# Patient Record
Sex: FEMALE | Race: WHITE | Employment: OTHER | URBAN - METROPOLITAN AREA
[De-identification: names, ages, dates, MRNs, and addresses within clinical notes are randomized per-mention and may not be internally consistent; named-entity substitution may affect disease eponyms.]

---

## 2017-07-10 NOTE — LETTER
7/12/2017 2:07 PM 
 
Ms. Ann Logan [facility] [address] [address] Office visit is due and need to bring cuff to check BP readings at home. Need lab work to assess kidney function on diclofenac.  
 
 
 
 
Sincerely, 
 
 
Imani Pierce MD

## 2017-07-11 RX ORDER — DICLOFENAC SODIUM 50 MG/1
TABLET, DELAYED RELEASE ORAL
Qty: 180 TAB | Refills: 0 | OUTPATIENT
Start: 2017-07-11

## 2017-07-11 NOTE — TELEPHONE ENCOUNTER
OV is due and need to bring cuff to check BP readings at home. Need lab work to assess kidney function on diclofenac.

## 2017-07-11 NOTE — TELEPHONE ENCOUNTER
Left message for patient to call back yo Advise per Dr. Edward Arzate that OV is due and need to bring cuff to check BP readings at home.  Need lab work to assess kidney function on diclofenac.

## 2017-07-17 RX ORDER — DICLOFENAC SODIUM 50 MG/1
TABLET, DELAYED RELEASE ORAL
Qty: 60 TAB | Refills: 0 | Status: SHIPPED | OUTPATIENT
Start: 2017-07-17 | End: 2017-09-05 | Stop reason: SDUPTHER

## 2017-07-17 NOTE — TELEPHONE ENCOUNTER
Pt wants to know can they have a perscription refilled until they come in for their appt on 8/7.  Please give pt a call back

## 2017-07-18 NOTE — TELEPHONE ENCOUNTER
Spoke with patient . Patient states that already scheduled an appointment . Advised patient to bring in blood pressure cuff.  Patient verbalized understanding

## 2017-08-07 ENCOUNTER — OFFICE VISIT (OUTPATIENT)
Dept: INTERNAL MEDICINE CLINIC | Age: 67
End: 2017-08-07

## 2017-08-07 VITALS
RESPIRATION RATE: 16 BRPM | WEIGHT: 174 LBS | HEART RATE: 94 BPM | BODY MASS INDEX: 27.31 KG/M2 | DIASTOLIC BLOOD PRESSURE: 70 MMHG | OXYGEN SATURATION: 98 % | HEIGHT: 67 IN | SYSTOLIC BLOOD PRESSURE: 165 MMHG

## 2017-08-07 DIAGNOSIS — R03.0 WHITE COAT SYNDROME WITHOUT DIAGNOSIS OF HYPERTENSION: Primary | ICD-10-CM

## 2017-08-07 DIAGNOSIS — Z28.21 VACCINATION REFUSED BY PATIENT: ICD-10-CM

## 2017-08-07 DIAGNOSIS — H25.013 CORTICAL AGE-RELATED CATARACT OF BOTH EYES: ICD-10-CM

## 2017-08-07 DIAGNOSIS — G43.009 NONINTRACTABLE MIGRAINE, UNSPECIFIED MIGRAINE TYPE: ICD-10-CM

## 2017-08-07 DIAGNOSIS — Z00.00 ROUTINE GENERAL MEDICAL EXAMINATION AT A HEALTH CARE FACILITY: ICD-10-CM

## 2017-08-07 DIAGNOSIS — Z53.20 COLONOSCOPY REFUSED: ICD-10-CM

## 2017-08-07 DIAGNOSIS — Z00.00 MEDICARE ANNUAL WELLNESS VISIT, SUBSEQUENT: ICD-10-CM

## 2017-08-07 DIAGNOSIS — Z01.810 PREOP CARDIOVASCULAR EXAM: ICD-10-CM

## 2017-08-07 DIAGNOSIS — M19.90 ARTHRITIS: ICD-10-CM

## 2017-08-07 DIAGNOSIS — Z71.89 LIVING WILL, COUNSELING/DISCUSSION: ICD-10-CM

## 2017-08-07 DIAGNOSIS — H40.10X0 OPEN-ANGLE GLAUCOMA OF RIGHT EYE, UNSPECIFIED GLAUCOMA STAGE, UNSPECIFIED OPEN-ANGLE GLAUCOMA TYPE: ICD-10-CM

## 2017-08-07 RX ORDER — ZOLMITRIPTAN 5 MG/1
5 TABLET, FILM COATED ORAL AS NEEDED
Qty: 12 TAB | Refills: 5 | Status: SHIPPED | OUTPATIENT
Start: 2017-08-07 | End: 2019-10-08 | Stop reason: ALTCHOICE

## 2017-08-07 RX ORDER — MINOXIDIL 50 MG/G
AEROSOL, FOAM TOPICAL
COMMUNITY
End: 2020-10-27

## 2017-08-07 NOTE — PROGRESS NOTES
HISTORY OF PRESENT ILLNESS  Giuliana Campos is a 69 y.o. female. HPI  Here for white coat htn. She brings her cuff though she has not checked at home recently. She has cataracts and needs them removed though she is concerned due to her glaucoma. She is due for labs. She uses nsaids daily for djd as tylenol does not work. She is due for a wellness visit. She needs a replacement for imitrex for migraines. Past Medical History:   Diagnosis Date    Degenerative joint disease (DJD) of lumbar spine     GERD (gastroesophageal reflux disease)     Glaucoma     PUD (peptic ulcer disease)     Rheumatic fever      Current Outpatient Prescriptions   Medication Sig    Minoxidil (ROGAINE) 5 % foam by Apply Externally route.  ZOLMitriptan (ZOMIG) 5 mg tablet Take 1 Tab by mouth as needed for Migraine.  diclofenac EC (VOLTAREN) 50 mg EC tablet TAKE 1 TABLET BY MOUTH TWICE DAILY    timolol (TIMOPTIC-XE) 0.25 % ophthalmic gel-forming INT 1 GTT IN OU QAM    Omega-3 Fatty Acids 300 mg cap Take  by mouth.  multivitamin (ONE A DAY) tablet Take 1 Tab by mouth daily.  cholecalciferol (VITAMIN D3) 1,000 unit tablet Take  by mouth daily. No current facility-administered medications for this visit. Review of Systems   All other systems reviewed and are negative. Visit Vitals    /70  Comment: at home meter 162/89    Pulse 94    Resp 16    Ht 5' 7\" (1.702 m)    Wt 174 lb (78.9 kg)    SpO2 98%    BMI 27.25 kg/m2     Her cuff is accurate. Physical Exam   Constitutional: She appears well-developed and well-nourished. Cardiovascular: Normal rate, regular rhythm and normal heart sounds. Pulmonary/Chest: Effort normal and breath sounds normal. No respiratory distress. She has no wheezes. She has no rales. Nursing note and vitals reviewed. ASSESSMENT and PLAN  Diagnoses and all orders for this visit:    1.  White coat syndrome without diagnosis of hypertension  Monitor at home and see me if >150/90.    2. Nonintractable migraine, unspecified migraine type  -   start  ZOLMitriptan (ZOMIG) 5 mg tablet; Take 1 Tab by mouth as needed for Migraine. 3. Arthritis  The current medical regimen is effective;  continue present plan and medications. Aware of GI side effects of NSAIDs. 4. Cortical age-related cataract of both eyes  -     REFERRAL TO OPHTHALMOLOGY    5. Open-angle glaucoma of right eye, unspecified glaucoma stage, unspecified open-angle glaucoma type  Per ophthalmology. 6. Preop cardiovascular exam  Safe for surgery.       Reviewed plan of care with the patient who has provided input and agrees with the goals

## 2017-08-07 NOTE — PATIENT INSTRUCTIONS
Medicare Part B Preventive Services Limitations Recommendation Scheduled   Glaucoma Screening  Covered for patients with diagnosis of diabetes or family history of glaucoma; -Americans age 48 and older; -Americans age 72 and older Completed  6/2017    Treated with timolol drops  Recommended every 6 months Due  11/2017   Colorectal Cancer Screening    -Fecal occult blood test every year OR  -Flexible sigmoidoscopy every 5 yrs OR  -Colonoscopy every 10 years OR  -Barium Enema Age 54-65; After age 76 if history of abnormal results Completed     Colonoscopy Recommended every 10 years  Patient declined     Let your provider know if you have any concerns     Bone Mass Measurement (Dexascan or Bone Density Scan)     Age 61 and older     Completed   12/8/2015    Recommended every 5 years Let your provider know if you have any concerns     Screening Mammography  Age 54-69  Completed  ?     Recommended every 2 years      Patient declined    Let your provider know if you have any concerns   Screening Pap Tests and Pelvic Examination  Age 18-67   Completed    Recommended every 3 years Completed     Let your provider know if you have any concerns     Cardiovascular Screening Blood Tests   (Cholesterol panel) Annually if on cholesterol medication Completed       Recommended every 5 years Due NOW   Diabetes Screening Tests    -Basic Metabolic Panel (BMP) or Hemoglobin A1C (HgbA1C)   BMP every 3 years for non-diabetic patients    HgbA1C every 3-6 months for diabetic patients     Completed        Due NOW   Seasonal Influenza Vaccination  Completed     Recommended Annually Patient declined   Prevnar Vaccine  (PCV 13)         Age 72 and older, protects against more types of Pneumonia than the Pneumococcal Vaccine alone Completed    Recommended once Due      Take prescription to pharmacy for administration   Pneumococcal Vaccination   (PPSV 21) Age 72 and older     Completed     Recommended twice after age 72, space vaccines 5 years apart  Due   Tetanus Vaccine All Ages    -Only covered by Medicare Part D through the [address] a prescription from your primary care provider   Completed   10/6/2011  Recommended every 10 years  Due   10/2021   Zoster Vaccine (Shingles) Age 61 and older    -Only covered by Medicare Part D through the pharmacy       Completed   10/6/2014      Recommended once  Completed    Family Practice Management 2011    Advanced Medical Directive/Living Will  If you have completed an Advanced Medical Directive or a Living Will, please bring a copy to the office at your convenience to be scanned into your record.

## 2017-08-07 NOTE — PROGRESS NOTES
This is an Initial Medicare Annual Wellness Exam (AWV) (Performed 12 months after IPPE or effective date of Medicare Part B enrollment, Once in a lifetime)    I have reviewed the patient's medical history in detail and updated the computerized patient record. History     Past Medical History:   Diagnosis Date    Degenerative joint disease (DJD) of lumbar spine     GERD (gastroesophageal reflux disease)     Glaucoma     PUD (peptic ulcer disease)     Rheumatic fever       History reviewed. No pertinent surgical history. Current Outpatient Prescriptions   Medication Sig Dispense Refill    Minoxidil (ROGAINE) 5 % foam by Apply Externally route.  ZOLMitriptan (ZOMIG) 5 mg tablet Take 1 Tab by mouth as needed for Migraine. 12 Tab 5    diclofenac EC (VOLTAREN) 50 mg EC tablet TAKE 1 TABLET BY MOUTH TWICE DAILY 60 Tab 0    timolol (TIMOPTIC-XE) 0.25 % ophthalmic gel-forming INT 1 GTT IN OU QAM  0    Omega-3 Fatty Acids 300 mg cap Take  by mouth.  multivitamin (ONE A DAY) tablet Take 1 Tab by mouth daily.  cholecalciferol (VITAMIN D3) 1,000 unit tablet Take  by mouth daily.        Allergies   Allergen Reactions    Aspirin Other (comments)     heartburn     Family History   Problem Relation Age of Onset    Cancer Mother      lung    Heart Disease Father      [place]   [address] [place] [address] Cancer Sister      colon    Diabetes Sister     Cancer Sister      breast     Social History   Substance Use Topics    Smoking status: Former Smoker     Quit date: 10/6/1978    Smokeless tobacco: Never Used    Alcohol use Yes     Patient Active Problem List   Diagnosis Code    Glaucoma H40.9    Degenerative joint disease (DJD) of lumbar spine M47.816    GERD (gastroesophageal reflux disease) K21.9    Rheumatic fever I00    PUD (peptic ulcer disease) K27.9    Vaccination refused by patient Z28.21    Colonoscopy refused Z53.20    Primary insomnia F51.01    White coat syndrome without diagnosis of hypertension R03.0  Living will, counseling/discussion Z71.89         Depression Risk Factor Screening:     PHQ over the last two weeks 8/7/2017   Little interest or pleasure in doing things Not at all   Feeling down, depressed or hopeless Not at all   Total Score PHQ 2 0     Alcohol Risk Factor Screening:   deferred    Functional Ability and Level of Safety:     Hearing Loss   none    Activities of Daily Living   Self-care. Requires assistance with: no ADLs    Fall Risk     Fall Risk Assessment, last 12 mths 8/7/2017   Able to walk? Yes   Fall in past 12 months? No     Abuse Screen   Patient is not abused    Review of Systems   Not required  annual wellness visit    Physical Examination     No exam data present    Evaluation of Cognitive Function:  Mood/affect:  happy  Appearance: age appropriate, casually dressed and within normal Limits  Family member/caregiver input: none present    No exam performed today, annual wellness visit. Patient Care Team:  Milli España MD as PCP - General (Internal Medicine)  Yeison Phipps MD (Dermatology)  Silvia Gallo MD (Orthopedic Surgery)  Robi Benites MD (Ophthalmology)    Advice/Referrals/Counseling   Education and counseling provided:  Are appropriate based on today's review and evaluation  End-of-Life planning (with patient's consent)  Pneumococcal Vaccine  Influenza Vaccine  Screening Mammography  Colorectal cancer screening tests  Cardiovascular screening blood test  Screening for glaucoma  Diabetes screening test      Assessment/Plan       ICD-10-CM ICD-9-CM    1. White coat syndrome without diagnosis of hypertension R03.0 796.2    2. Nonintractable migraine, unspecified migraine type G43.009 346.10 ZOLMitriptan (ZOMIG) 5 mg tablet   3. Arthritis M19.90 716.90    4.  Cortical age-related cataract of both eyes H25.013 366.15 REFERRAL TO OPHTHALMOLOGY   5. Open-angle glaucoma of right eye, unspecified glaucoma stage, unspecified open-angle glaucoma type H40.10X0 365.10 365.70    6. Preop cardiovascular exam Z01.810 V72.81    7. Medicare annual wellness visit, subsequent R74.13 P59.4 METABOLIC PANEL, COMPREHENSIVE      CBC WITH AUTOMATED DIFF      LIPID PANEL   8. Living will, counseling/discussion Z71.89 V65.49    9. Colonoscopy refused Z53.20 V64.2    10. Vaccination refused by patient Z28.21 V64.06      An After Visit Summary was printed and given to the patient. 1. All age appropriate screenings and immunizations are discussed with patient. Mrs. Lisa Rodriguez has hx of glaucoma and gets her eyes checked every 6 months, last eye exam was 6/2017. Mrs. Lisa Rodriguez declines mammogram at this time stating that she can not lay down on the exam table d/t hip pain. She is advised that it is usually done standing. She still declines stating she is not interested d/t false positives and bx she has had done in the past.  She has declined having a colorectal screening done as well. She does not want to get any vaccines (pneumo 23, prevnar, or influenza)  as she states she has had adverse effects from immunizations in the past.  Patient is given lab slip for CMP, lipid panel, and CBC today. 2.  Mrs. Lisa Rodriguez has an AMD and POA paperwork at home. She is advised if she brings a copy of her AMD to our office it can be scanned into her EMR. 3.  Patient does low impact aerobic exercise 2-3 times weekly. She has tried water aerobics in the past but felt the water was too hot.

## 2017-08-07 NOTE — MR AVS SNAPSHOT
Visit Information Date & Time Provider Department Dept. Phone Encounter #  
 8/7/2017  2:45 PM Elihu Holter, MD [facility] [phone] [id number] Follow-up Instructions Return in about 6 months (around 2/7/2018). Upcoming Health Maintenance Date Due Hepatitis C Screening 1950 FOBT Q 1 YEAR AGE 50-75 3/8/1998 Pneumococcal 65+ Low/Medium Risk (1 of 2 - PCV13) 3/8/2013 MEDICARE YEARLY EXAM 8/8/2018 GLAUCOMA SCREENING Q2Y 6/1/2019 BREAST CANCER SCRN MAMMOGRAM 8/7/2019 DTaP/Tdap/Td series (2 - Td) 10/6/2021 Allergies as of 8/7/2017  Review Complete On: 8/7/2017 By: Lynn Weaver LPN Severity Noted Reaction Type Reactions Aspirin  10/06/2016    Other (comments)  
 heartburn Current Immunizations  Reviewed on 10/6/2016 Name Date Tdap 10/6/2011 Zoster Vaccine, Live 10/6/2014 Not reviewed this visit You Were Diagnosed With   
  
 Codes Comments White coat syndrome without diagnosis of hypertension    -  Primary ICD-10-CM: R03.0 ICD-9-CM: 796.2 Nonintractable migraine, unspecified migraine type     ICD-10-CM: G43.009 ICD-9-CM: 346.10 Arthritis     ICD-10-CM: M19.90 ICD-9-CM: 716.90 Cortical age-related cataract of both eyes     ICD-10-CM: H25.013 
ICD-9-CM: 366.15 Open-angle glaucoma of right eye, unspecified glaucoma stage, unspecified open-angle glaucoma type     ICD-10-CM: H40.10X0 ICD-9-CM: 365.10, 365.70 Preop cardiovascular exam     ICD-10-CM: Z01.810 ICD-9-CM: V72.81 Medicare annual wellness visit, subsequent     ICD-10-CM: Z00.00 ICD-9-CM: V70.0 Living will, counseling/discussion     ICD-10-CM: Z71.89 ICD-9-CM: V65.49 Colonoscopy refused     ICD-10-CM: Z53.20 ICD-9-CM: V64.2 Vaccination refused by patient     ICD-10-CM: Z28.21 ICD-9-CM: V64.06 Vitals BP Pulse Resp Height(growth percentile) Weight(growth percentile) SpO2 165/70 94 16 5' 7\" (1.702 m) 174 lb (78.9 kg) 98% BMI OB Status Smoking Status 27.25 kg/m2 Unknown Former Smoker Vitals History BMI and BSA Data Body Mass Index Body Surface Area  
 27.25 kg/m 2 1.93 m 2 Preferred Pharmacy Pharmacy Name Phone [facility] [address] [place] [address] [address] [facility] [phone] Your Updated Medication List  
  
   
This list is accurate as of: 8/7/17  3:17 PM.  Always use your most recent med list.  
  
  
  
  
 diclofenac EC 50 mg EC tablet Commonly known as:  VOLTAREN  
TAKE 1 TABLET BY MOUTH TWICE DAILY  
  
 multivitamin tablet Commonly known as:  ONE A DAY Take 1 Tab by mouth daily. Omega-3 Fatty Acids 300 mg Cap Take  by mouth. ROGAINE 5 % Foam  
Generic drug:  Minoxidil  
by Apply Externally route. timolol 0.25 % ophthalmic gel-forming Commonly known as:  TIMOPTIC-XE  
INT 1 GTT IN OU QAM  
  
 VITAMIN D3 1,000 unit tablet Generic drug:  cholecalciferol Take  by mouth daily. ZOLMitriptan 5 mg tablet Commonly known as:  ZOMIG Take 1 Tab by mouth as needed for Migraine. Prescriptions Sent to Pharmacy Refills ZOLMitriptan (ZOMIG) 5 mg tablet 5 Sig: Take 1 Tab by mouth as needed for Migraine. Class: Normal  
 Pharmacy: [facility] [address] [facility] Ph #: [phone] Route: Oral  
  
We Performed the Following CBC WITH AUTOMATED DIFF [32968 CPT(R)] LIPID PANEL [07735 CPT(R)] METABOLIC PANEL, COMPREHENSIVE [29406 CPT(R)] REFERRAL TO OPHTHALMOLOGY [REF57 Custom] Comments:  
 Please evaluate patient for cataract. Follow-up Instructions Return in about 6 months (around 2/7/2018). Referral Information Referral ID Referred By Referred To  
  
 [id number] Carl sweeney, [facility] [employer] [facility], [address] Visits Status Start Date End Date 1 New Request 8/7/17 8/7/18 If your referral has a status of pending review or denied, additional information will be sent to support the outcome of this decision. Patient Instructions Medicare Part B Preventive Services Limitations Recommendation Scheduled Glaucoma Screening  Covered for patients with diagnosis of diabetes or family history of glaucoma; -Americans age 48 and older; -Americans age 72 and older Completed 6/2017 Treated with timolol drops Recommended every 6 months Due 
11/2017 Colorectal Cancer Screening 
 
-Fecal occult blood test every year OR 
-Flexible sigmoidoscopy every 5 yrs OR 
-Colonoscopy every 10 years OR 
-Barium Enema Age 54-65; After age 76 if history of abnormal results Completed Colonoscopy Recommended every 10 years  Patient declined Let your provider know if you have any concerns Bone Mass Measurement (Dexascan or Bone Density Scan) Age 61 and older Completed 12/8/2015 Recommended every 5 years Let your provider know if you have any concerns Screening Mammography  Age 54-69  Completed ? Recommended every 2 years Patient declined Let your provider know if you have any concerns Screening Pap Tests and Pelvic Examination  Age 18-67 Completed Recommended every 3 years Completed Let your provider know if you have any concerns Cardiovascular Screening Blood Tests  
(Cholesterol panel) Annually if on cholesterol medication Completed Recommended every 5 years Due NOW Diabetes Screening Tests -Basic Metabolic Panel (BMP) or Hemoglobin A1C (HgbA1C) BMP every 3 years for non-diabetic patients HgbA1C every 3-6 months for diabetic patients Completed Due NOW Seasonal Influenza Vaccination  Completed Recommended Annually Patient declined Prevnar Vaccine (PCV 13) Age 72 and older, protects against more types of Pneumonia than the Pneumococcal Vaccine alone Completed Recommended once Due Take prescription to pharmacy for administration Pneumococcal Vaccination  
(PPSV 23) Age 72 and older Completed Recommended twice after age 72, space vaccines 5 years apart  Due Tetanus Vaccine All Ages 
 
-Only covered by Medicare Part D through the pharmacy -Requires a prescription from your primary care provider Completed 10/6/2011 Recommended every 10 years  Due  
10/2021 Zoster Vaccine (Shingles) Age 61 and older 
 
-Only covered by Medicare Part D through the pharmacy Completed 10/6/2014 Recommended once  Completed Family Practice Management 2011 Advanced Medical Directive/Living Will If you have completed an Advanced Medical Directive or a Living Will, please bring a copy to the office at your convenience to be scanned into your record. Introducing \[facility]\"" & HEALTH SERVICES! [facility] introduces [employer] patient portal. Now you can access parts of your medical record, email your doctor's office, and request medication refills online. 1. In your internet browser, go to [email or web address] 2. Click on the First Time User? Click Here link in the Sign In box. You will see the New Member Sign Up page. 3. Enter your [employer] Access Code exactly as it appears below. You will not need to use this code after youve completed the sign-up process. If you do not sign up before the expiration date, you must request a new code. · [employer] Access Code: [email or web address] Expires: 11/5/2017  3:07 PM 
 
4. Enter the last four digits of your Social Security Number (xxxx) and Date of Birth (mm/dd/yyyy) as indicated and click Submit. You will be taken to the next sign-up page. 5. Create a [employer] ID. This will be your [employer] login ID and cannot be changed, so think of one that is secure and easy to remember. 6. Create a [employer] password. You can change your password at any time. 7. Enter your Password Reset Question and Answer. This can be used at a later time if you forget your password. 8. Enter your e-mail address. You will receive e-mail notification when new information is available in [address] 9. Click Sign Up. You can now view and download portions of your medical record. 10. Click the Download Summary menu link to download a portable copy of your medical information. If you have questions, please visit the Frequently Asked Questions section of the [employer] website. Remember, [employer] is NOT to be used for urgent needs. For medical emergencies, dial 911. Now available from your iPhone and Android! Please provide this summary of care documentation to your next provider. Your primary care clinician is listed as [address] [address] [place] [address] [address]. If you have any questions after today's visit, please call [phone].

## 2017-08-25 ENCOUNTER — HOSPITAL ENCOUNTER (OUTPATIENT)
Dept: LAB | Age: 67
Discharge: HOME | End: 2017-08-25
Payer: MEDICARE

## 2017-08-25 PROCEDURE — 85025 COMPLETE CBC W/AUTO DIFF WBC: CPT

## 2017-08-25 PROCEDURE — 80053 COMPREHEN METABOLIC PANEL: CPT

## 2017-08-25 PROCEDURE — 80061 LIPID PANEL: CPT

## 2017-08-25 PROCEDURE — 36415 COLL VENOUS BLD VENIPUNCTURE: CPT

## 2017-08-26 LAB
ALBUMIN SERPL-MCNC: 4.6 G/DL (ref 3.6–4.8)
ALBUMIN/GLOB SERPL: 1.9 {RATIO} (ref 1.2–2.2)
ALP SERPL-CCNC: 67 IU/L (ref 39–117)
ALT SERPL-CCNC: 24 IU/L (ref 0–32)
AST SERPL-CCNC: 21 IU/L (ref 0–40)
BASOPHILS # BLD AUTO: 0.1 X10E3/UL (ref 0–0.2)
BASOPHILS NFR BLD AUTO: 1 %
BILIRUB SERPL-MCNC: 0.9 MG/DL (ref 0–1.2)
BUN SERPL-MCNC: 19 MG/DL (ref 8–27)
BUN/CREAT SERPL: 18 (ref 12–28)
CALCIUM SERPL-MCNC: 10.4 MG/DL (ref 8.7–10.3)
CHLORIDE SERPL-SCNC: 95 MMOL/L (ref 96–106)
CHOLEST SERPL-MCNC: 187 MG/DL (ref 100–199)
CO2 SERPL-SCNC: 25 MMOL/L (ref 18–29)
CREAT SERPL-MCNC: 1.05 MG/DL (ref 0.57–1)
EOSINOPHIL # BLD AUTO: 0.3 X10E3/UL (ref 0–0.4)
EOSINOPHIL NFR BLD AUTO: 5 %
ERYTHROCYTE [DISTWIDTH] IN BLOOD BY AUTOMATED COUNT: 13.7 % (ref 12.3–15.4)
GLOBULIN SER CALC-MCNC: 2.4 G/DL (ref 1.5–4.5)
GLUCOSE SERPL-MCNC: 86 MG/DL (ref 65–99)
HCT VFR BLD AUTO: 39.2 % (ref 34–46.6)
HDLC SERPL-MCNC: 53 MG/DL
HGB BLD-MCNC: 13 G/DL (ref 11.1–15.9)
IMM GRANULOCYTES # BLD: 0 X10E3/UL (ref 0–0.1)
IMM GRANULOCYTES NFR BLD: 0 %
INTERPRETATION, 910389: NORMAL
INTERPRETATION: NORMAL
LDLC SERPL CALC-MCNC: 110 MG/DL (ref 0–99)
LYMPHOCYTES # BLD AUTO: 1.8 X10E3/UL (ref 0.7–3.1)
LYMPHOCYTES NFR BLD AUTO: 26 %
MCH RBC QN AUTO: 30.9 PG (ref 26.6–33)
MCHC RBC AUTO-ENTMCNC: 33.2 G/DL (ref 31.5–35.7)
MCV RBC AUTO: 93 FL (ref 79–97)
MONOCYTES # BLD AUTO: 0.7 X10E3/UL (ref 0.1–0.9)
MONOCYTES NFR BLD AUTO: 10 %
NEUTROPHILS # BLD AUTO: 4.1 X10E3/UL (ref 1.4–7)
NEUTROPHILS NFR BLD AUTO: 58 %
PDF IMAGE, 910387: NORMAL
PLATELET # BLD AUTO: 354 X10E3/UL (ref 150–379)
POTASSIUM SERPL-SCNC: 4.7 MMOL/L (ref 3.5–5.2)
PROT SERPL-MCNC: 7 G/DL (ref 6–8.5)
RBC # BLD AUTO: 4.21 X10E6/UL (ref 3.77–5.28)
SODIUM SERPL-SCNC: 137 MMOL/L (ref 134–144)
TRIGL SERPL-MCNC: 121 MG/DL (ref 0–149)
VLDLC SERPL CALC-MCNC: 24 MG/DL (ref 5–40)
WBC # BLD AUTO: 6.9 X10E3/UL (ref 3.4–10.8)

## 2017-09-05 ENCOUNTER — TELEPHONE (OUTPATIENT)
Dept: INTERNAL MEDICINE CLINIC | Age: 67
End: 2017-09-05

## 2017-09-06 ENCOUNTER — TELEPHONE (OUTPATIENT)
Dept: INTERNAL MEDICINE CLINIC | Age: 67
End: 2017-09-06

## 2017-09-06 RX ORDER — DICLOFENAC SODIUM 50 MG/1
TABLET, DELAYED RELEASE ORAL
Qty: 180 TAB | Refills: 0 | Status: SHIPPED | OUTPATIENT
Start: 2017-09-06 | End: 2017-11-24 | Stop reason: SDUPTHER

## 2017-09-08 ENCOUNTER — TELEPHONE (OUTPATIENT)
Dept: INTERNAL MEDICINE CLINIC | Age: 67
End: 2017-09-08

## 2017-09-08 NOTE — TELEPHONE ENCOUNTER
Spoke with patient. Requesting lab results. While speaking with patient the line dropped. Attempted the call patient back the line is busy.  All labs are fine and a copy of the labs has been sent

## 2017-09-08 NOTE — TELEPHONE ENCOUNTER
Pt is returning call to [address] contact number to reach pt is [phone], leave message if not able to reach.              From answering service

## 2019-10-08 ENCOUNTER — HOSPITAL ENCOUNTER (OUTPATIENT)
Dept: LAB | Age: 69
Discharge: HOME | End: 2019-10-08
Payer: MEDICARE

## 2019-10-08 ENCOUNTER — OFFICE VISIT (OUTPATIENT)
Dept: INTERNAL MEDICINE CLINIC | Age: 69
End: 2019-10-08

## 2019-10-08 VITALS
TEMPERATURE: 97.5 F | SYSTOLIC BLOOD PRESSURE: 132 MMHG | HEART RATE: 57 BPM | RESPIRATION RATE: 20 BRPM | OXYGEN SATURATION: 98 % | BODY MASS INDEX: 26.21 KG/M2 | WEIGHT: 167 LBS | DIASTOLIC BLOOD PRESSURE: 66 MMHG | HEIGHT: 67 IN

## 2019-10-08 DIAGNOSIS — N89.8 VAGINAL DISCHARGE: Primary | ICD-10-CM

## 2019-10-08 DIAGNOSIS — R35.0 URINARY FREQUENCY: ICD-10-CM

## 2019-10-08 DIAGNOSIS — N89.8 VAGINAL ITCHING: ICD-10-CM

## 2019-10-08 LAB
BILIRUB UR QL STRIP: NEGATIVE
GLUCOSE UR-MCNC: NEGATIVE MG/DL
KETONES P FAST UR STRIP-MCNC: NEGATIVE MG/DL
PH UR STRIP: 7 [PH] (ref 4.6–8)
PROT UR QL STRIP: NEGATIVE
SP GR UR STRIP: 1.01 (ref 1–1.03)
UA UROBILINOGEN AMB POC: NORMAL (ref 0.2–1)
URINALYSIS CLARITY POC: CLEAR
URINALYSIS COLOR POC: YELLOW
URINE BLOOD POC: NEGATIVE
URINE LEUKOCYTES POC: NORMAL
URINE NITRITES POC: NEGATIVE

## 2019-10-08 PROCEDURE — 87086 URINE CULTURE/COLONY COUNT: CPT

## 2019-10-08 RX ORDER — 1.1% SODIUM FLUORIDE PRESCRIPTION DENTAL CREAM 5 MG/G
CREAM DENTAL
Refills: 4 | COMMUNITY
Start: 2019-09-18

## 2019-10-08 NOTE — PROGRESS NOTES
Chief Complaint   Patient presents with    Vaginal Discharge     1.5 weeks     she is a 68y.o. year old female who presents for evaluation of vaginal discharge. Patient has been having some discharge. She states she had some itching last week as well. She has some urinary leakage so she often wears a pad. She states she saw some dark appearing discharge on the pad. She was not sure if it was in her urine or was coming from her vagina. She admits to scratching through her clothing last week. The itching has since resolved. She denies abdominal pain or cramping. No burning with urination. Last GYN exam was many years ago. Reviewed PmHx, RxHx, FmHx, SocHx, AllgHx and updated and dated in the chart. Review of Systems - negative except as listed above    Objective:     Vitals:    10/08/19 0957   BP: 132/66   Pulse: (!) 57   Resp: 20   Temp: 97.5 °F (36.4 °C)   TempSrc: Oral   SpO2: 98%   Weight: 167 lb (75.8 kg)   Height: 5' 7\" (1.702 m)     Physical Examination: external vaginal area mild erythema noted. Slight vaginal discharge noted with what appears to be blood tinged. I am not able to visualize the cervix because the patient has a difficult time getting into position due to previous bilateral hip replacement. Assessment/ Plan:   Diagnoses and all orders for this visit:    1. Vaginal discharge  -     REFERRAL TO GYNECOLOGY    2. Vaginal itching  -     CULTURE, URINE  -     AMB POC URINALYSIS DIP STICK MANUAL W/O MICRO    3. Urinary frequency  -     CULTURE, URINE  -     AMB POC URINALYSIS DIP STICK MANUAL W/O MICRO       the patient is going to call her gyn doctor today for an appointment. I will be sending her urine for a culture. She will be contact with these results. We talked about the importance of following up this appointment. I have discussed the diagnosis with the patient and the intended plan as seen in the above orders.   The patient has received an after-visit summary and questions were answered concerning future plans.        Shanell Marrero PA-C

## 2019-10-08 NOTE — PROGRESS NOTES
Patient states she is has brown discharge for the last 1.5 weeks, patient wears a pad and states when she sneezes she is incontinent and then has this brown spot. Patient is not sure if it is vaginal or urinary. Patient states having itching, did not treat, patient states slight burning not sure where coming from, feels more like irritated.

## 2019-10-10 LAB — BACTERIA UR CULT: NORMAL

## 2019-10-11 NOTE — PROGRESS NOTES
Writer contacted patient to inform of UC results and inquiry per Sabrina Mann, patient saw GYN yesterday, biopsies taken and she will get results sometime next week.

## 2019-11-12 ENCOUNTER — OFFICE VISIT (OUTPATIENT)
Dept: GYNECOLOGY | Age: 69
End: 2019-11-12

## 2019-11-12 ENCOUNTER — TELEPHONE (OUTPATIENT)
Dept: GYNECOLOGY | Age: 69
End: 2019-11-12

## 2019-11-12 VITALS
WEIGHT: 167.4 LBS | BODY MASS INDEX: 26.27 KG/M2 | HEART RATE: 60 BPM | HEIGHT: 67 IN | SYSTOLIC BLOOD PRESSURE: 159 MMHG | DIASTOLIC BLOOD PRESSURE: 92 MMHG

## 2019-11-12 DIAGNOSIS — C54.1 ENDOMETRIAL CANCER (HCC): Primary | ICD-10-CM

## 2019-11-12 NOTE — PROGRESS NOTES
[address] [place] [address] Mathias Moritz Formerly [facility], [address] [place] [address] Ya MOLINA [phone]  F [phone]    Office Note  Patient ID:  Name:  Josr Cruz  MRN:  [id number]  :  1950/69 y.o. Date:  2019      HISTORY OF PRESENT ILLNESS:  Josr Cruz is a 68 y.o.  postmenopausal female who is being seen for a diagnosis of endometrial cancer. She is referred by Dr. Connie Steiner with [facility] [place] [facility]. She presented to him for evaluation with the complaint of postmenopausal bleeding. A pap smear and ECC were negative, but an endometrial biopsy revealed a poorly differentiated endometrial carcinoma. She initially was seen by Dr. Noah Holloway for consultation. She comes to me for a second opinion, because she would like to have her surgery at [facility]. She had a CT of the chest/abdomen/pelvis that showed no evidence of metastatic disease. She reports a family history of endometrial cancer. Her sister had an advance endometrial cancer treated with surgery, chemotherapy, and radiation. ROS:   and GI review:  Negative  Cardiopulmonary review:  Negative   Musculoskeletal:  Negative    A comprehensive review of systems was negative except for that written in the History of Present Illness. , 10 point ROS      OB/GYN ROS:    There is no history of significant gyn problems or procedures.       Problem List:  Patient Active Problem List    Diagnosis Date Noted    White coat syndrome without diagnosis of hypertension 2017    Living will, counseling/discussion 2017    Vaccination refused by patient 10/06/2016    Colonoscopy refused 10/06/2016    Primary insomnia 10/06/2016    Glaucoma     Degenerative joint disease (DJD) of lumbar spine     GERD (gastroesophageal reflux disease)     Rheumatic fever     PUD (peptic ulcer disease)      PMH:  Past Medical History:   Diagnosis Date    Degenerative joint disease (DJD) of lumbar spine     GERD (gastroesophageal reflux disease)     Glaucoma     PUD (peptic ulcer disease)     Rheumatic fever       PSH:  Past Surgical History:   Procedure Laterality Date    HX COLPOSCOPY      abn pap    HX HIP REPLACEMENT      x2      Social History:  Social History     Tobacco Use    Smoking status: Former Smoker     Last attempt to quit: 10/6/1978     Years since quittin.1    Smokeless tobacco: Never Used   Substance Use Topics    Alcohol use: Yes      Family History:  Family History   Problem Relation Age of Onset    Cancer Mother         lung    Heart Disease Father         pacer    Cancer Sister         colon    Diabetes Sister     Cancer Sister         breast    Cancer Sister         endometrial      Medications: (reviewed)  Current Outpatient Medications   Medication Sig    SF 5000 PLUS 1.1 % crea USE  D    timolol (TIMOPTIC-XE) 0.25 % ophthalmic gel-forming INT 1 GTT IN OU QAM    Omega-3 Fatty Acids 300 mg cap Take 3 Tabs by mouth daily.  multivitamin (ONE A DAY) tablet Take 1 Tab by mouth daily.  cholecalciferol (VITAMIN D3) 1,000 unit tablet Take 1,000 Units by mouth daily.  Minoxidil (ROGAINE) 5 % foam by Apply Externally route. No current facility-administered medications for this visit. Allergies: (reviewed)  Allergies   Allergen Reactions    Aspirin Other (comments)     heartburn          OBJECTIVE:    Physical Exam:  VITAL SIGNS: Vitals:    19 0752 19 0801   BP: (!) 168/99 (!) 159/92   Pulse: (!) 59 60   Weight: 167 lb 6.4 oz (75.9 kg)    Height: 5' 7.01\" (1.702 m)      Body mass index is 26.21 kg/m². GENERAL SHAMEKA: Conversant, alert, oriented. No acute distress. HEENT: HEENT. No thyroid enlargement. No JVD. Neck: Supple without restrictions. RESPIRATORY: Clear to auscultation and percussion to the bases. No CVAT. CARDIOVASC: RRR without murmur/rub.    GASTROINT: soft, non-tender, without masses or organomegaly   MUSCULOSKEL: no joint tenderness, deformity or swelling   EXTREMITIES: extremities normal, atraumatic, no cyanosis or edema   PELVIC: Vulva and vagina appear normal. Normal cervix. Bimanual exam reveals normal uterus and adnexa. No parametrial thickening. Uterus mobile. No cul de sac nodularity. RECTAL: Deferred   EDEL SURVEY: No suspicious lymphadenopathy or edema noted. NEURO: Grossly intact. No acute deficit. Lab Data:    Lab Results   Component Value Date/Time    WBC 6.9 08/25/2017 09:53 AM    HGB 13.0 08/25/2017 09:53 AM    HCT 39.2 08/25/2017 09:53 AM    PLATELET 396 38/86/0482 09:53 AM    MCV 93 08/25/2017 09:53 AM     Lab Results   Component Value Date/Time    Sodium 137 08/25/2017 09:53 AM    Potassium 4.7 08/25/2017 09:53 AM    Chloride 95 (L) 08/25/2017 09:53 AM    CO2 25 08/25/2017 09:53 AM    Glucose 86 08/25/2017 09:53 AM    BUN 19 08/25/2017 09:53 AM    Creatinine 1.05 (H) 08/25/2017 09:53 AM    BUN/Creatinine ratio 18 08/25/2017 09:53 AM    GFR est AA 63 08/25/2017 09:53 AM    GFR est non-AA 54 (L) 08/25/2017 09:53 AM    Calcium 10.4 (H) 08/25/2017 09:53 AM         Imaging: Outside CT scan report of chest/abdomen/pelvis from [facility] reviewed. I personally reviewed the images. Abnormal fluid attenuation within the endometrial cavity. No infiltration of the parametrial tissue. No appreciable pelvic or retroperitoneal lymphadenopathy. No ascites. No peritoneal implants. IMPRESSION/PLAN:  Buelah Lundborg is a 68 y.o. female with a working diagnosis of a poorly differentiated endometrial cancer, clinically confined to the uterus. I reviewed with Buelah Lundborg her medical records, physical exam, and review of symptoms. I discussed with her the standard of care for managing endometrial cancer.   The recommendation would be to proceed with total hysterectomy, which I would perform robotically, along with bilateral salpingooophorectomy, sentinel pelvic lymph node dissection, evaluation of the paraaortic nodes, and evaluation of the omentum. She was counseled on the risks, benefits, indications, and alternatives of the procedures. Her questions were answered to her satisfaction. I spent about an hour with her in consultation answering her questions. She wishes to proceed with surgery with me at [facility] and will schedule at this time.       Signed By: Rosy Lewis MD     11/12/2019/7:49 AM

## 2019-11-12 NOTE — Clinical Note
11/12/19 Patient: Frederica Mortimer YOB: 1950 Date of Visit: 11/12/2019 Navid Yee MD 
VIA Dear Navid Yee MD, Thank you for referring Ms. Christophe Barriga to [facility] for evaluation. My notes for this consultation are attached. If you have questions, please do not hesitate to call me. I look forward to following your patient along with you.  
 
 
Sincerely, 
 
Duy Chavarria MD

## 2019-11-12 NOTE — LETTER
2019 8:01 AM 
 
Patient:  Natalya Meyers YOB: 1950 Date of Visit: 2019 Dear Jasmin Cristina MD 
[address] [address] [facility] [address] VIA Facsimile: [phone] 
 : Thank you for referring Ms. Pardeep Young to me for evaluation/treatment. Below are the relevant portions of my assessment and plan of care. New patient referred for endometrial cancer for a second opinion. Patient c/o daily spotting. [address], [address] [place], [address] 
P [phone]  F [phone] Office Note Patient ID: 
Name:  Natalya Meyers MRN:  [id number] :  1950/69 y.o. Date:  2019 HISTORY OF PRESENT ILLNESS: 
Natalya Meyers is a 68 y.o.  postmenopausal female who is being seen for a diagnosis of endometrial cancer. She is referred by Dr. Carlota Tolbert with [facility] [place]. She presented to him for evaluation with the complaint of postmenopausal bleeding. A pap smear and ECC were negative, but an endometrial biopsy revealed a poorly differentiated endometrial carcinoma. She initially was seen by Dr. Demetrio Nava for consultation. She comes to me for a second opinion. She had a CT of the chest/abdomen/pelvis that showed no evidence of metastatic disease. ROS: 
 and GI review:  Negative Cardiopulmonary review:  Negative Musculoskeletal:  Negative A comprehensive review of systems was negative except for that written in the History of Present Illness. , 10 point ROS 
 
 
OB/GYN ROS: 
 There is no history of significant gyn problems or procedures. Problem List: 
Patient Active Problem List  
 Diagnosis Date Noted  White coat syndrome without diagnosis of hypertension 2017  Living will, counseling/discussion 2017  Vaccination refused by patient 10/06/2016  Colonoscopy refused 10/06/2016  Primary insomnia 10/06/2016  Glaucoma  Degenerative joint disease (DJD) of lumbar spine  GERD (gastroesophageal reflux disease)  Rheumatic fever  PUD (peptic ulcer disease) PMH: 
Past Medical History:  
Diagnosis Date  Degenerative joint disease (DJD) of lumbar spine  GERD (gastroesophageal reflux disease)  Glaucoma  PUD (peptic ulcer disease)  Rheumatic fever PSH: 
Past Surgical History:  
Procedure Laterality Date  HX COLPOSCOPY    
 abn pap  HX HIP REPLACEMENT    
 x2 Social History: 
Social History Tobacco Use  Smoking status: Former Smoker Last attempt to quit: 10/6/1978 Years since quittin.1  Smokeless tobacco: Never Used Substance Use Topics  Alcohol use: Yes Family History: 
Family History Problem Relation Age of Onset  Cancer Mother   
     lung  Heart Disease Father   
     pacer  Cancer Sister   
     colon  Diabetes Sister  Cancer Sister   
     breast  
 Cancer Sister   
     endometrial  
  
Medications: (reviewed) Current Outpatient Medications Medication Sig  SF 5000 PLUS 1.1 % crea USE  D  
 timolol (TIMOPTIC-XE) 0.25 % ophthalmic gel-forming INT 1 GTT IN OU QAM  
 Omega-3 Fatty Acids 300 mg cap Take 3 Tabs by mouth daily.  multivitamin (ONE A DAY) tablet Take 1 Tab by mouth daily.  cholecalciferol (VITAMIN D3) 1,000 unit tablet Take 1,000 Units by mouth daily.  Minoxidil (ROGAINE) 5 % foam by Apply Externally route. No current facility-administered medications for this visit. Allergies: (reviewed) Allergies Allergen Reactions  Aspirin Other (comments)  
  heartburn OBJECTIVE: 
 
Physical Exam: VITAL SIGNS: Vitals:  
 19 0752 19 0801 BP: (!) 168/99 (!) 159/92 Pulse: (!) 59 60 Weight: 167 lb 6.4 oz (75.9 kg) Height: 5' 7.01\" (1.702 m) Body mass index is 26.21 kg/m². GENERAL SHAMEKA: Conversant, alert, oriented. No acute distress. HEENT: HEENT. No thyroid enlargement. No JVD. Neck: Supple without restrictions. RESPIRATORY: Clear to auscultation and percussion to the bases. No CVAT. CARDIOVASC: RRR without murmur/rub. GASTROINT: soft, non-tender, without masses or organomegaly MUSCULOSKEL: no joint tenderness, deformity or swelling EXTREMITIES: extremities normal, atraumatic, no cyanosis or edema PELVIC: Vulva and vagina appear normal. Normal cervix. Bimanual exam reveals normal uterus and adnexa. No parametrial thickening. Uterus mobile. No cul de sac nodularity. RECTAL: Deferred EDEL SURVEY: No suspicious lymphadenopathy or edema noted. NEURO: Grossly intact. No acute deficit. Lab Data: 
 
Lab Results Component Value Date/Time WBC 6.9 08/25/2017 09:53 AM  
 HGB 13.0 08/25/2017 09:53 AM  
 HCT 39.2 08/25/2017 09:53 AM  
 PLATELET 810 48/42/0474 09:53 AM  
 MCV 93 08/25/2017 09:53 AM  
 
Lab Results Component Value Date/Time Sodium 137 08/25/2017 09:53 AM  
 Potassium 4.7 08/25/2017 09:53 AM  
 Chloride 95 (L) 08/25/2017 09:53 AM  
 CO2 25 08/25/2017 09:53 AM  
 Glucose 86 08/25/2017 09:53 AM  
 BUN 19 08/25/2017 09:53 AM  
 Creatinine 1.05 (H) 08/25/2017 09:53 AM  
 BUN/Creatinine ratio 18 08/25/2017 09:53 AM  
 GFR est AA 63 08/25/2017 09:53 AM  
 GFR est non-AA 54 (L) 08/25/2017 09:53 AM  
 Calcium 10.4 (H) 08/25/2017 09:53 AM  
 
 
 
Imaging: Outside CT scan report of chest/abdomen/pelvis from [facility] reviewed. I personally reviewed the images. Abnormal fluid attenuation within the endometrial cavity. No infiltration of the parametrial tissue. No appreciable pelvic or retroperitoneal lymphadenopathy. No ascites. No peritoneal implants.  
 
 
IMPRESSION/PLAN: 
Ton Pool is a 68 y.o. female with a working diagnosis of a poorly differentiated endometrial cancer, clinically confined to the uterus. I reviewed with Fedebinh Arpita her medical records, physical exam, and review of symptoms. I discussed with her the standard of care for managing endometrial cancer. The recommendation would be to proceed with total hysterectomy, which I would perform robotically, along with bilateral salpingooophorectomy, sentinel pelvic lymph node dissection, evaluation of the paraaortic nodes, and evaluation of the omentum. She was counseled on the risks, benefits, indications, and alternatives of the procedures. Her questions were answered to her satisfaction. I spent about an hour with her in consultation answering her questions. She wishes to proceed with surgery with me at [facility] and will schedule at this time. Signed By: Gasper York MD   
 11/12/2019/7:49 AM  
 
 
 
If you have questions, please do not hesitate to call me. I look forward to following Ms. Lillian Hurd along with you.  
 
 
 
Sincerely, 
 
 
Gasper York MD

## 2019-11-20 ENCOUNTER — TELEPHONE (OUTPATIENT)
Dept: INTERNAL MEDICINE CLINIC | Age: 69
End: 2019-11-20

## 2019-11-20 ENCOUNTER — HOSPITAL ENCOUNTER (OUTPATIENT)
Dept: PREADMISSION TESTING | Age: 69
Discharge: HOME | End: 2019-11-20
Payer: MEDICARE

## 2019-11-20 VITALS
SYSTOLIC BLOOD PRESSURE: 165 MMHG | BODY MASS INDEX: 26.29 KG/M2 | DIASTOLIC BLOOD PRESSURE: 83 MMHG | WEIGHT: 167.5 LBS | RESPIRATION RATE: 18 BRPM | HEIGHT: 67 IN | TEMPERATURE: 98.1 F | HEART RATE: 51 BPM

## 2019-11-20 LAB
ALBUMIN SERPL-MCNC: 3.8 G/DL (ref 3.5–5)
ALBUMIN/GLOB SERPL: 1.1 {RATIO} (ref 1.1–2.2)
ALP SERPL-CCNC: 76 U/L (ref 45–117)
ALT SERPL-CCNC: 27 U/L (ref 12–78)
ANION GAP SERPL CALC-SCNC: 3 MMOL/L (ref 5–15)
AST SERPL-CCNC: 16 U/L (ref 15–37)
ATRIAL RATE: 49 BPM
BASOPHILS # BLD: 0 K/UL (ref 0–0.1)
BASOPHILS NFR BLD: 1 % (ref 0–1)
BILIRUB SERPL-MCNC: 0.7 MG/DL (ref 0.2–1)
BUN SERPL-MCNC: 12 MG/DL (ref 6–20)
BUN/CREAT SERPL: 14 (ref 12–20)
CALCIUM SERPL-MCNC: 9.6 MG/DL (ref 8.5–10.1)
CALCULATED P AXIS, ECG09: 48 DEGREES
CALCULATED R AXIS, ECG10: 9 DEGREES
CALCULATED T AXIS, ECG11: 25 DEGREES
CANCER AG125 SERPL-ACNC: 11 U/ML (ref 1.5–35)
CHLORIDE SERPL-SCNC: 101 MMOL/L (ref 97–108)
CO2 SERPL-SCNC: 32 MMOL/L (ref 21–32)
CREAT SERPL-MCNC: 0.85 MG/DL (ref 0.55–1.02)
DIAGNOSIS, 93000: NORMAL
DIFFERENTIAL METHOD BLD: NORMAL
EOSINOPHIL # BLD: 0.2 K/UL (ref 0–0.4)
EOSINOPHIL NFR BLD: 3 % (ref 0–7)
ERYTHROCYTE [DISTWIDTH] IN BLOOD BY AUTOMATED COUNT: 13.1 % (ref 11.5–14.5)
GLOBULIN SER CALC-MCNC: 3.4 G/DL (ref 2–4)
GLUCOSE SERPL-MCNC: 90 MG/DL (ref 65–100)
HCT VFR BLD AUTO: 41.8 % (ref 35–47)
HGB BLD-MCNC: 13.1 G/DL (ref 11.5–16)
IMM GRANULOCYTES # BLD AUTO: 0 K/UL (ref 0–0.04)
IMM GRANULOCYTES NFR BLD AUTO: 0 % (ref 0–0.5)
LYMPHOCYTES # BLD: 1.5 K/UL (ref 0.8–3.5)
LYMPHOCYTES NFR BLD: 22 % (ref 12–49)
MCH RBC QN AUTO: 30.5 PG (ref 26–34)
MCHC RBC AUTO-ENTMCNC: 31.3 G/DL (ref 30–36.5)
MCV RBC AUTO: 97.2 FL (ref 80–99)
MONOCYTES # BLD: 0.6 K/UL (ref 0–1)
MONOCYTES NFR BLD: 8 % (ref 5–13)
NEUTS SEG # BLD: 4.7 K/UL (ref 1.8–8)
NEUTS SEG NFR BLD: 66 % (ref 32–75)
NRBC # BLD: 0 K/UL (ref 0–0.01)
NRBC BLD-RTO: 0 PER 100 WBC
P-R INTERVAL, ECG05: 170 MS
PLATELET # BLD AUTO: 300 K/UL (ref 150–400)
PMV BLD AUTO: 9.7 FL (ref 8.9–12.9)
POTASSIUM SERPL-SCNC: 4.6 MMOL/L (ref 3.5–5.1)
PROT SERPL-MCNC: 7.2 G/DL (ref 6.4–8.2)
Q-T INTERVAL, ECG07: 432 MS
QRS DURATION, ECG06: 76 MS
QTC CALCULATION (BEZET), ECG08: 390 MS
RBC # BLD AUTO: 4.3 M/UL (ref 3.8–5.2)
SODIUM SERPL-SCNC: 136 MMOL/L (ref 136–145)
VENTRICULAR RATE, ECG03: 49 BPM
WBC # BLD AUTO: 7.1 K/UL (ref 3.6–11)

## 2019-11-20 PROCEDURE — 85025 COMPLETE CBC W/AUTO DIFF WBC: CPT

## 2019-11-20 PROCEDURE — 36415 COLL VENOUS BLD VENIPUNCTURE: CPT

## 2019-11-20 PROCEDURE — 80053 COMPREHEN METABOLIC PANEL: CPT

## 2019-11-20 PROCEDURE — 86304 IMMUNOASSAY TUMOR CA 125: CPT

## 2019-11-20 PROCEDURE — 93005 ELECTROCARDIOGRAM TRACING: CPT

## 2019-11-20 NOTE — TELEPHONE ENCOUNTER
Writer contacted patient to inform of UC results and inquiry per Jessica Arauz, patient saw GYN yesterday, biopsies taken and she will get results sometime next week.

## 2019-11-20 NOTE — PERIOP NOTES
SPOKE WITH DR. Nicholas Landry OFFICE STAFF, TO REPORT THAT PT WANTS TO TAKE A CAB HOME WHEN DISCHARGED FROM THE HOSPITAL POST SURGERY. SHE STATES SHE LIVES ALONE AND HAS NO ONE AVAILABLE TO PICK HER UP.

## 2019-11-20 NOTE — TELEPHONE ENCOUNTER
----- Message from Dino Liu LPN sent at 29/28/0919 10:06 AM EDT -----  Writer contacted patient to inform of UC results and inquiry per Leo Shah, patient saw GYN yesterday, biopsies taken and she will get results sometime next week.

## 2019-11-21 PROBLEM — R00.1 SINUS BRADYCARDIA BY ELECTROCARDIOGRAM: Status: ACTIVE | Noted: 2019-11-21

## 2019-11-21 NOTE — ADVANCED PRACTICE NURSE
Dr Mona Hawk (anesthesiologist) informed of EKG SB 52, GYN surgery with Dr Espinoza Dec 11/27, PMHx, no beta blocker, pulse usually [id number] per chart review, >4 METs. No further eval required, per Dr Mona Hawk.

## 2019-11-26 NOTE — H&P
[address] [place] [address] Mathias Moritz [address] MOLINA [phone]  F [phone]        Patient ID:  Name:  Carolyn Frausto  MRN:  [id number]  :  1950/69 y.o. Date:  2019      HISTORY OF PRESENT ILLNESS:  Carolyn Frausto is a 68 y.o.  postmenopausal female who is being seen for a diagnosis of endometrial cancer. She is referred by Dr. Gilberto Causey with [facility]. She presented to him for evaluation with the complaint of postmenopausal bleeding. A pap smear and ECC were negative, but an endometrial biopsy revealed a poorly differentiated endometrial carcinoma. She initially was seen by Dr. Holly Acharya for consultation. She comes to me for a second opinion, because she would like to have her surgery at [facility]. She had a CT of the chest/abdomen/pelvis that showed no evidence of metastatic disease. She reports a family history of endometrial cancer. Her sister had an advance endometrial cancer treated with surgery, chemotherapy, and radiation. ROS:   and GI review:  Negative  Cardiopulmonary review:  Negative   Musculoskeletal:  Negative    A comprehensive review of systems was negative except for that written in the History of Present Illness. , 10 point ROS      OB/GYN ROS:    There is no history of significant gyn problems or procedures.       Problem List:  Patient Active Problem List    Diagnosis Date Noted    Sinus bradycardia by electrocardiogram 2019    White coat syndrome without diagnosis of hypertension 2017    Living will, counseling/discussion 2017    Vaccination refused by patient 10/06/2016    Colonoscopy refused 10/06/2016    Primary insomnia 10/06/2016    Glaucoma     Degenerative joint disease (DJD) of lumbar spine     GERD (gastroesophageal reflux disease)     Rheumatic fever     PUD (peptic ulcer disease)      PMH:  Past Medical History:   Diagnosis Date  BCC (basal cell carcinoma)     Cancer ([facility] [address] 10/2019    UTERINE    Degenerative joint disease (DJD) of lumbar spine     GERD (gastroesophageal reflux disease)     Glaucoma     Headache     Nausea & vomiting     PUD (peptic ulcer disease)     \"MANY YEARS AGO\"    Rheumatic fever     AS CHILD    Sinus bradycardia by electrocardiogram 2019      PSH:  Past Surgical History:   Procedure Laterality Date    HX COLPOSCOPY      abn pap    HX HEENT      SHAMA CATARACTS    HX HIP REPLACEMENT      x2    HX ORTHOPAEDIC      SHAMA HIP REPLACEMENT    HX ORTHOPAEDIC      LEFT ELBOW FOR FX X2    HX OTHER SURGICAL      DOG BIT SURGERY ON RIGHT HAND    HX WISDOM TEETH EXTRACTION        Social History:  Social History     Tobacco Use    Smoking status: Former Smoker     Packs/day: 0.50     Years: 20.00     Pack years: 10.00     Last attempt to quit: 10/6/1978     Years since quittin.1    Smokeless tobacco: Never Used    Tobacco comment: SOMOKE OFF AND ON   Substance Use Topics    Alcohol use: Yes     Comment: RARELY      Family History:  Family History   Problem Relation Age of Onset    Cancer Mother         lung    Arthritis-osteo Mother     Heart Disease Father         pacer    Arthritis-osteo Father     Cancer Father         SKIN    Pacemaker Father     Heart Surgery Father         Venus Olguinulder Sister         colon    Diabetes Sister     Other Sister         \"SLIGHT MENTAL RETARDATION\"   [facility] [place] Cancer Sister         breast    Other Sister         \"GUILLIAN BARRE\"   [facility] [place] Cancer Sister         endometrial    No Known Problems Daughter     Anesth Problems Neg Hx       Medications: (reviewed)  No current facility-administered medications for this encounter. Current Outpatient Medications   Medication Sig    SF 5000 PLUS 1.1 % crea TOOTHPASTE    Minoxidil (ROGAINE) 5 % foam by Apply Externally route.  timolol (TIMOPTIC-XE) 0.25 % ophthalmic gel-forming Administer 1 Drop to both eyes daily.  Omega-3 Fatty Acids 300 mg cap Take 3 Tabs by mouth daily.  multivitamin (ONE A DAY) tablet Take 1 Tab by mouth daily. OR AREDS    cholecalciferol (VITAMIN D3) 1,000 unit tablet Take 1,000 Units by mouth daily. Allergies: (reviewed)  Allergies   Allergen Reactions    Aspirin Other (comments)     heartburn          OBJECTIVE:    Physical Exam:  VITAL SIGNS: There were no vitals filed for this visit. There is no height or weight on file to calculate BMI. GENERAL SHAMEKA: Conversant, alert, oriented. No acute distress. HEENT: HEENT. No thyroid enlargement. No JVD. Neck: Supple without restrictions. RESPIRATORY: Clear to auscultation and percussion to the bases. No CVAT. CARDIOVASC: RRR without murmur/rub. GASTROINT: soft, non-tender, without masses or organomegaly   MUSCULOSKEL: no joint tenderness, deformity or swelling   EXTREMITIES: extremities normal, atraumatic, no cyanosis or edema   PELVIC: Vulva and vagina appear normal. Normal cervix. Bimanual exam reveals normal uterus and adnexa. No parametrial thickening. Uterus mobile. No cul de sac nodularity. RECTAL: Deferred   EDEL SURVEY: No suspicious lymphadenopathy or edema noted. NEURO: Grossly intact. No acute deficit.        Lab Data:    Lab Results   Component Value Date/Time    WBC 7.1 11/20/2019 11:59 AM    HGB 13.1 11/20/2019 11:59 AM    HCT 41.8 11/20/2019 11:59 AM    PLATELET 066 68/08/9865 11:59 AM    MCV 97.2 11/20/2019 11:59 AM     Lab Results   Component Value Date/Time    Sodium 136 11/20/2019 11:59 AM    Potassium 4.6 11/20/2019 11:59 AM    Chloride 101 11/20/2019 11:59 AM    CO2 32 11/20/2019 11:59 AM    Anion gap 3 (L) 11/20/2019 11:59 AM    Glucose 90 11/20/2019 11:59 AM    BUN 12 11/20/2019 11:59 AM    Creatinine 0.85 11/20/2019 11:59 AM    BUN/Creatinine ratio 14 11/20/2019 11:59 AM    GFR est AA >60 11/20/2019 11:59 AM    GFR est non-AA >60 11/20/2019 11:59 AM    Calcium 9.6 11/20/2019 11:59 AM     Lab Results Component Value Date/Time    CA-125 11 11/20/2019 11:59 AM         Imaging: Outside CT scan report of chest/abdomen/pelvis from [facility] reviewed. I personally reviewed the images. Abnormal fluid attenuation within the endometrial cavity. No infiltration of the parametrial tissue. No appreciable pelvic or retroperitoneal lymphadenopathy. No ascites. No peritoneal implants. IMPRESSION/PLAN:  Unique Ferrari is a 68 y.o. female with a working diagnosis of a poorly differentiated endometrial cancer, clinically confined to the uterus. I reviewed with Unique Ferrari her medical records, physical exam, and review of symptoms. I discussed with her the standard of care for managing endometrial cancer. The recommendation would be to proceed with total hysterectomy, which I would perform robotically, along with bilateral salpingooophorectomy, sentinel pelvic lymph node dissection, evaluation of the paraaortic nodes, and evaluation of the omentum. She was counseled on the risks, benefits, indications, and alternatives of the procedures. Her questions were answered to her satisfaction. I spent about an hour with her in consultation answering her questions. She wishes to proceed with surgery with me at [facility] and will schedule at this time. Signed By: Leobardo Camp MD     11/26/2019/7:49 AM     Date of Surgery Update:  Unique Ferrari was seen and examined. History and physical has been reviewed. The patient has been examined. There have been no significant clinical changes since the completion of the originally dated History and Physical.  Patient identified by surgeon; surgical site was confirmed by patient and surgeon.     Signed By: Leobardo Camp MD     November 27, 2019 7:09 AM

## 2019-11-27 ENCOUNTER — HOSPITAL ENCOUNTER (OUTPATIENT)
Age: 69
Setting detail: OBSERVATION
Discharge: HOME | End: 2019-11-28
Attending: OBSTETRICS & GYNECOLOGY | Admitting: OBSTETRICS & GYNECOLOGY
Payer: MEDICARE

## 2019-11-27 ENCOUNTER — ANESTHESIA EVENT (OUTPATIENT)
Dept: SURGERY | Age: 69
End: 2019-11-27
Payer: MEDICARE

## 2019-11-27 ENCOUNTER — ANESTHESIA (OUTPATIENT)
Dept: SURGERY | Age: 69
End: 2019-11-27
Payer: MEDICARE

## 2019-11-27 DIAGNOSIS — G89.18 ACUTE POSTOPERATIVE PAIN: Primary | ICD-10-CM

## 2019-11-27 PROBLEM — C54.1 ENDOMETRIAL CANCER (HCC): Status: ACTIVE | Noted: 2019-11-27

## 2019-11-27 PROCEDURE — 77030008477 HC STYL SATN SLP COVD -A: Performed by: ANESTHESIOLOGY

## 2019-11-27 PROCEDURE — 77030002933 HC SUT MCRYL J&J -A: Performed by: OBSTETRICS & GYNECOLOGY

## 2019-11-27 PROCEDURE — 77030031492 HC PRT ACC BLNT AIRSEAL CNMD -B: Performed by: OBSTETRICS & GYNECOLOGY

## 2019-11-27 PROCEDURE — 77030020703 HC SEAL CANN DISP INTU -B: Performed by: OBSTETRICS & GYNECOLOGY

## 2019-11-27 PROCEDURE — 88360 TUMOR IMMUNOHISTOCHEM/MANUAL: CPT

## 2019-11-27 PROCEDURE — 77030040830 HC CATH URETH FOL MDII -A: Performed by: OBSTETRICS & GYNECOLOGY

## 2019-11-27 PROCEDURE — 77030016151 HC PROTCTR LNS DFOG COVD -B: Performed by: OBSTETRICS & GYNECOLOGY

## 2019-11-27 PROCEDURE — 76010000875 HC OR TIME 1.5 TO 2HR INTENSV - TIER 2: Performed by: OBSTETRICS & GYNECOLOGY

## 2019-11-27 PROCEDURE — 74011000250 HC RX REV CODE- 250: Performed by: OBSTETRICS & GYNECOLOGY

## 2019-11-27 PROCEDURE — 77030035277 HC OBTRTR BLDELSS DISP INTU -B: Performed by: OBSTETRICS & GYNECOLOGY

## 2019-11-27 PROCEDURE — 77030008684 HC TU ET CUF COVD -B: Performed by: ANESTHESIOLOGY

## 2019-11-27 PROCEDURE — 77030008756 HC TU IRR SUC STRY -B: Performed by: OBSTETRICS & GYNECOLOGY

## 2019-11-27 PROCEDURE — 74011000258 HC RX REV CODE- 258: Performed by: OBSTETRICS & GYNECOLOGY

## 2019-11-27 PROCEDURE — 76210000006 HC OR PH I REC 0.5 TO 1 HR: Performed by: OBSTETRICS & GYNECOLOGY

## 2019-11-27 PROCEDURE — 88309 TISSUE EXAM BY PATHOLOGIST: CPT

## 2019-11-27 PROCEDURE — 77030020263 HC SOL INJ SOD CL0.9% LFCR 1000ML: Performed by: OBSTETRICS & GYNECOLOGY

## 2019-11-27 PROCEDURE — 77030018836 HC SOL IRR NACL ICUM -A: Performed by: OBSTETRICS & GYNECOLOGY

## 2019-11-27 PROCEDURE — 74011000250 HC RX REV CODE- 250: Performed by: NURSE ANESTHETIST, CERTIFIED REGISTERED

## 2019-11-27 PROCEDURE — 77030040361 HC SLV COMPR DVT MDII -B: Performed by: OBSTETRICS & GYNECOLOGY

## 2019-11-27 PROCEDURE — 77030027138 HC INCENT SPIROMETER -A

## 2019-11-27 PROCEDURE — 77030035029 HC NDL INSUF VERES DISP COVD -B: Performed by: OBSTETRICS & GYNECOLOGY

## 2019-11-27 PROCEDURE — 74011250637 HC RX REV CODE- 250/637: Performed by: ANESTHESIOLOGY

## 2019-11-27 PROCEDURE — 74011250636 HC RX REV CODE- 250/636: Performed by: OBSTETRICS & GYNECOLOGY

## 2019-11-27 PROCEDURE — 88307 TISSUE EXAM BY PATHOLOGIST: CPT

## 2019-11-27 PROCEDURE — 77030003666 HC NDL SPINAL BD -A: Performed by: OBSTETRICS & GYNECOLOGY

## 2019-11-27 PROCEDURE — 99218 HC RM OBSERVATION: CPT

## 2019-11-27 PROCEDURE — 77030018778 HC MANIP UTER VCAR CNMD -B: Performed by: OBSTETRICS & GYNECOLOGY

## 2019-11-27 PROCEDURE — 74011250636 HC RX REV CODE- 250/636: Performed by: NURSE ANESTHETIST, CERTIFIED REGISTERED

## 2019-11-27 PROCEDURE — 77030011640 HC PAD GRND REM COVD -A: Performed by: OBSTETRICS & GYNECOLOGY

## 2019-11-27 PROCEDURE — 74011250636 HC RX REV CODE- 250/636: Performed by: ANESTHESIOLOGY

## 2019-11-27 PROCEDURE — 74011000254 HC RX REV CODE- 254: Performed by: OBSTETRICS & GYNECOLOGY

## 2019-11-27 PROCEDURE — 88342 IMHCHEM/IMCYTCHM 1ST ANTB: CPT

## 2019-11-27 PROCEDURE — 77030013079 HC BLNKT BAIR HGGR 3M -A: Performed by: ANESTHESIOLOGY

## 2019-11-27 PROCEDURE — 88112 CYTOPATH CELL ENHANCE TECH: CPT

## 2019-11-27 PROCEDURE — 76060000034 HC ANESTHESIA 1.5 TO 2 HR: Performed by: OBSTETRICS & GYNECOLOGY

## 2019-11-27 PROCEDURE — 77030002934 HC SUT MCRYL J&J -B: Performed by: OBSTETRICS & GYNECOLOGY

## 2019-11-27 RX ORDER — SODIUM CHLORIDE 0.9 % (FLUSH) 0.9 %
5-40 SYRINGE (ML) INJECTION AS NEEDED
Status: DISCONTINUED | OUTPATIENT
Start: 2019-11-27 | End: 2019-11-28 | Stop reason: HOSPADM

## 2019-11-27 RX ORDER — SODIUM CHLORIDE 0.9 % (FLUSH) 0.9 %
5-40 SYRINGE (ML) INJECTION EVERY 8 HOURS
Status: DISCONTINUED | OUTPATIENT
Start: 2019-11-27 | End: 2019-11-28 | Stop reason: HOSPADM

## 2019-11-27 RX ORDER — MIDAZOLAM HYDROCHLORIDE 1 MG/ML
INJECTION, SOLUTION INTRAMUSCULAR; INTRAVENOUS AS NEEDED
Status: DISCONTINUED | OUTPATIENT
Start: 2019-11-27 | End: 2019-11-27 | Stop reason: HOSPADM

## 2019-11-27 RX ORDER — FENTANYL CITRATE 50 UG/ML
50 INJECTION, SOLUTION INTRAMUSCULAR; INTRAVENOUS AS NEEDED
Status: DISCONTINUED | OUTPATIENT
Start: 2019-11-27 | End: 2019-11-27 | Stop reason: HOSPADM

## 2019-11-27 RX ORDER — EPHEDRINE SULFATE/0.9% NACL/PF 50 MG/5 ML
SYRINGE (ML) INTRAVENOUS AS NEEDED
Status: DISCONTINUED | OUTPATIENT
Start: 2019-11-27 | End: 2019-11-27 | Stop reason: HOSPADM

## 2019-11-27 RX ORDER — LORAZEPAM 2 MG/ML
1 INJECTION INTRAMUSCULAR
Status: DISCONTINUED | OUTPATIENT
Start: 2019-11-27 | End: 2019-11-28 | Stop reason: HOSPADM

## 2019-11-27 RX ORDER — TRAMADOL HYDROCHLORIDE 50 MG/1
50 TABLET ORAL
Status: DISCONTINUED | OUTPATIENT
Start: 2019-11-27 | End: 2019-11-28 | Stop reason: HOSPADM

## 2019-11-27 RX ORDER — KETOROLAC TROMETHAMINE 30 MG/ML
INJECTION, SOLUTION INTRAMUSCULAR; INTRAVENOUS AS NEEDED
Status: DISCONTINUED | OUTPATIENT
Start: 2019-11-27 | End: 2019-11-27 | Stop reason: HOSPADM

## 2019-11-27 RX ORDER — HYDROMORPHONE HYDROCHLORIDE 1 MG/ML
0.2 INJECTION, SOLUTION INTRAMUSCULAR; INTRAVENOUS; SUBCUTANEOUS
Status: DISCONTINUED | OUTPATIENT
Start: 2019-11-27 | End: 2019-11-27 | Stop reason: HOSPADM

## 2019-11-27 RX ORDER — INDOCYANINE GREEN AND WATER 25 MG
KIT INJECTION AS NEEDED
Status: DISCONTINUED | OUTPATIENT
Start: 2019-11-27 | End: 2019-11-27 | Stop reason: HOSPADM

## 2019-11-27 RX ORDER — FENTANYL CITRATE 50 UG/ML
25 INJECTION, SOLUTION INTRAMUSCULAR; INTRAVENOUS
Status: DISCONTINUED | OUTPATIENT
Start: 2019-11-27 | End: 2019-11-27 | Stop reason: HOSPADM

## 2019-11-27 RX ORDER — HYDROMORPHONE HYDROCHLORIDE 1 MG/ML
1 INJECTION, SOLUTION INTRAMUSCULAR; INTRAVENOUS; SUBCUTANEOUS
Status: DISCONTINUED | OUTPATIENT
Start: 2019-11-27 | End: 2019-11-28 | Stop reason: HOSPADM

## 2019-11-27 RX ORDER — LIDOCAINE HYDROCHLORIDE 10 MG/ML
0.1 INJECTION, SOLUTION EPIDURAL; INFILTRATION; INTRACAUDAL; PERINEURAL AS NEEDED
Status: DISCONTINUED | OUTPATIENT
Start: 2019-11-27 | End: 2019-11-27 | Stop reason: HOSPADM

## 2019-11-27 RX ORDER — DIPHENHYDRAMINE HYDROCHLORIDE 50 MG/ML
12.5 INJECTION, SOLUTION INTRAMUSCULAR; INTRAVENOUS
Status: DISCONTINUED | OUTPATIENT
Start: 2019-11-27 | End: 2019-11-28 | Stop reason: HOSPADM

## 2019-11-27 RX ORDER — ONDANSETRON 2 MG/ML
INJECTION INTRAMUSCULAR; INTRAVENOUS AS NEEDED
Status: DISCONTINUED | OUTPATIENT
Start: 2019-11-27 | End: 2019-11-27 | Stop reason: HOSPADM

## 2019-11-27 RX ORDER — ONDANSETRON 2 MG/ML
4 INJECTION INTRAMUSCULAR; INTRAVENOUS AS NEEDED
Status: DISCONTINUED | OUTPATIENT
Start: 2019-11-27 | End: 2019-11-27 | Stop reason: HOSPADM

## 2019-11-27 RX ORDER — LIDOCAINE HYDROCHLORIDE 20 MG/ML
INJECTION, SOLUTION EPIDURAL; INFILTRATION; INTRACAUDAL; PERINEURAL AS NEEDED
Status: DISCONTINUED | OUTPATIENT
Start: 2019-11-27 | End: 2019-11-27 | Stop reason: HOSPADM

## 2019-11-27 RX ORDER — SUCCINYLCHOLINE CHLORIDE 20 MG/ML
INJECTION INTRAMUSCULAR; INTRAVENOUS AS NEEDED
Status: DISCONTINUED | OUTPATIENT
Start: 2019-11-27 | End: 2019-11-27 | Stop reason: HOSPADM

## 2019-11-27 RX ORDER — ACETAMINOPHEN 325 MG/1
650 TABLET ORAL ONCE
Status: COMPLETED | OUTPATIENT
Start: 2019-11-27 | End: 2019-11-27

## 2019-11-27 RX ORDER — SODIUM CHLORIDE 0.9 % (FLUSH) 0.9 %
5-40 SYRINGE (ML) INJECTION AS NEEDED
Status: DISCONTINUED | OUTPATIENT
Start: 2019-11-27 | End: 2019-11-27 | Stop reason: HOSPADM

## 2019-11-27 RX ORDER — PROPOFOL 10 MG/ML
INJECTION, EMULSION INTRAVENOUS AS NEEDED
Status: DISCONTINUED | OUTPATIENT
Start: 2019-11-27 | End: 2019-11-27 | Stop reason: HOSPADM

## 2019-11-27 RX ORDER — SODIUM CHLORIDE, SODIUM LACTATE, POTASSIUM CHLORIDE, CALCIUM CHLORIDE 600; 310; 30; 20 MG/100ML; MG/100ML; MG/100ML; MG/100ML
50 INJECTION, SOLUTION INTRAVENOUS CONTINUOUS
Status: DISCONTINUED | OUTPATIENT
Start: 2019-11-27 | End: 2019-11-27 | Stop reason: HOSPADM

## 2019-11-27 RX ORDER — FENTANYL CITRATE 50 UG/ML
INJECTION, SOLUTION INTRAMUSCULAR; INTRAVENOUS AS NEEDED
Status: DISCONTINUED | OUTPATIENT
Start: 2019-11-27 | End: 2019-11-27 | Stop reason: HOSPADM

## 2019-11-27 RX ORDER — ONDANSETRON 2 MG/ML
4 INJECTION INTRAMUSCULAR; INTRAVENOUS
Status: DISCONTINUED | OUTPATIENT
Start: 2019-11-27 | End: 2019-11-28 | Stop reason: HOSPADM

## 2019-11-27 RX ORDER — ACETAMINOPHEN 10 MG/ML
1000 INJECTION, SOLUTION INTRAVENOUS EVERY 8 HOURS
Status: COMPLETED | OUTPATIENT
Start: 2019-11-27 | End: 2019-11-28

## 2019-11-27 RX ORDER — SODIUM CHLORIDE 0.9 % (FLUSH) 0.9 %
5-40 SYRINGE (ML) INJECTION EVERY 8 HOURS
Status: DISCONTINUED | OUTPATIENT
Start: 2019-11-27 | End: 2019-11-27 | Stop reason: HOSPADM

## 2019-11-27 RX ORDER — DEXAMETHASONE SODIUM PHOSPHATE 4 MG/ML
INJECTION, SOLUTION INTRA-ARTICULAR; INTRALESIONAL; INTRAMUSCULAR; INTRAVENOUS; SOFT TISSUE AS NEEDED
Status: DISCONTINUED | OUTPATIENT
Start: 2019-11-27 | End: 2019-11-27 | Stop reason: HOSPADM

## 2019-11-27 RX ORDER — ROCURONIUM BROMIDE 10 MG/ML
INJECTION, SOLUTION INTRAVENOUS AS NEEDED
Status: DISCONTINUED | OUTPATIENT
Start: 2019-11-27 | End: 2019-11-27 | Stop reason: HOSPADM

## 2019-11-27 RX ORDER — TIMOLOL MALEATE 2.5 MG/ML
1 SOLUTION OPHTHALMIC DAILY
Status: DISCONTINUED | OUTPATIENT
Start: 2019-11-27 | End: 2019-11-28 | Stop reason: HOSPADM

## 2019-11-27 RX ORDER — PROCHLORPERAZINE EDISYLATE 5 MG/ML
10 INJECTION INTRAMUSCULAR; INTRAVENOUS
Status: DISCONTINUED | OUTPATIENT
Start: 2019-11-27 | End: 2019-11-28 | Stop reason: HOSPADM

## 2019-11-27 RX ORDER — SODIUM CHLORIDE, SODIUM LACTATE, POTASSIUM CHLORIDE, CALCIUM CHLORIDE 600; 310; 30; 20 MG/100ML; MG/100ML; MG/100ML; MG/100ML
INJECTION, SOLUTION INTRAVENOUS
Status: DISCONTINUED | OUTPATIENT
Start: 2019-11-27 | End: 2019-11-27 | Stop reason: HOSPADM

## 2019-11-27 RX ORDER — KETOROLAC TROMETHAMINE 30 MG/ML
15 INJECTION, SOLUTION INTRAMUSCULAR; INTRAVENOUS EVERY 6 HOURS
Status: COMPLETED | OUTPATIENT
Start: 2019-11-27 | End: 2019-11-28

## 2019-11-27 RX ORDER — SODIUM CHLORIDE 9 MG/ML
100 INJECTION, SOLUTION INTRAVENOUS CONTINUOUS
Status: DISCONTINUED | OUTPATIENT
Start: 2019-11-27 | End: 2019-11-28 | Stop reason: HOSPADM

## 2019-11-27 RX ORDER — MIDAZOLAM HYDROCHLORIDE 1 MG/ML
1 INJECTION, SOLUTION INTRAMUSCULAR; INTRAVENOUS AS NEEDED
Status: DISCONTINUED | OUTPATIENT
Start: 2019-11-27 | End: 2019-11-27 | Stop reason: HOSPADM

## 2019-11-27 RX ADMIN — ROCURONIUM BROMIDE 25 MG: 10 SOLUTION INTRAVENOUS at 07:52

## 2019-11-27 RX ADMIN — Medication 10 ML: at 21:49

## 2019-11-27 RX ADMIN — Medication 5 MG: at 07:47

## 2019-11-27 RX ADMIN — FENTANYL CITRATE 50 MCG: 50 INJECTION, SOLUTION INTRAMUSCULAR; INTRAVENOUS at 07:32

## 2019-11-27 RX ADMIN — ACETAMINOPHEN 1000 MG: 10 INJECTION, SOLUTION INTRAVENOUS at 16:06

## 2019-11-27 RX ADMIN — DEXAMETHASONE SODIUM PHOSPHATE 4 MG: 4 INJECTION, SOLUTION INTRAMUSCULAR; INTRAVENOUS at 07:56

## 2019-11-27 RX ADMIN — ROCURONIUM BROMIDE 10 MG: 10 SOLUTION INTRAVENOUS at 08:20

## 2019-11-27 RX ADMIN — ROCURONIUM BROMIDE 5 MG: 10 SOLUTION INTRAVENOUS at 07:32

## 2019-11-27 RX ADMIN — ONDANSETRON HYDROCHLORIDE 4 MG: 2 INJECTION, SOLUTION INTRAMUSCULAR; INTRAVENOUS at 08:59

## 2019-11-27 RX ADMIN — KETOROLAC TROMETHAMINE 15 MG: 30 INJECTION, SOLUTION INTRAMUSCULAR at 21:49

## 2019-11-27 RX ADMIN — CEFOTETAN DISODIUM 2 G: 2 INJECTION, POWDER, FOR SOLUTION INTRAMUSCULAR; INTRAVENOUS at 07:53

## 2019-11-27 RX ADMIN — Medication 5 MG: at 09:01

## 2019-11-27 RX ADMIN — ONDANSETRON 4 MG: 2 INJECTION INTRAMUSCULAR; INTRAVENOUS at 09:37

## 2019-11-27 RX ADMIN — SODIUM CHLORIDE 100 ML/HR: 900 INJECTION, SOLUTION INTRAVENOUS at 19:53

## 2019-11-27 RX ADMIN — LIDOCAINE HYDROCHLORIDE 80 MG: 20 INJECTION, SOLUTION EPIDURAL; INFILTRATION; INTRACAUDAL; PERINEURAL at 07:32

## 2019-11-27 RX ADMIN — KETOROLAC TROMETHAMINE 30 MG: 30 INJECTION, SOLUTION INTRAMUSCULAR; INTRAVENOUS at 08:59

## 2019-11-27 RX ADMIN — ROCURONIUM BROMIDE 10 MG: 10 SOLUTION INTRAVENOUS at 08:41

## 2019-11-27 RX ADMIN — FENTANYL CITRATE 25 MCG: 50 INJECTION, SOLUTION INTRAMUSCULAR; INTRAVENOUS at 09:38

## 2019-11-27 RX ADMIN — CEFAZOLIN 1 G: 1 INJECTION, POWDER, FOR SOLUTION INTRAMUSCULAR; INTRAVENOUS; PARENTERAL at 16:06

## 2019-11-27 RX ADMIN — ACETAMINOPHEN 650 MG: 325 TABLET ORAL at 06:59

## 2019-11-27 RX ADMIN — KETOROLAC TROMETHAMINE 15 MG: 30 INJECTION, SOLUTION INTRAMUSCULAR at 15:00

## 2019-11-27 RX ADMIN — FENTANYL CITRATE 50 MCG: 50 INJECTION, SOLUTION INTRAMUSCULAR; INTRAVENOUS at 07:55

## 2019-11-27 RX ADMIN — MIDAZOLAM 2 MG: 1 INJECTION INTRAMUSCULAR; INTRAVENOUS at 07:24

## 2019-11-27 RX ADMIN — PROPOFOL 150 MG: 10 INJECTION, EMULSION INTRAVENOUS at 07:32

## 2019-11-27 RX ADMIN — SUCCINYLCHOLINE CHLORIDE 140 MG: 20 INJECTION, SOLUTION INTRAMUSCULAR; INTRAVENOUS at 07:32

## 2019-11-27 RX ADMIN — FENTANYL CITRATE 25 MCG: 50 INJECTION, SOLUTION INTRAMUSCULAR; INTRAVENOUS at 10:25

## 2019-11-27 RX ADMIN — SODIUM CHLORIDE 100 ML/HR: 900 INJECTION, SOLUTION INTRAVENOUS at 10:00

## 2019-11-27 RX ADMIN — SODIUM CHLORIDE, POTASSIUM CHLORIDE, SODIUM LACTATE AND CALCIUM CHLORIDE: 600; 310; 30; 20 INJECTION, SOLUTION INTRAVENOUS at 07:52

## 2019-11-27 RX ADMIN — Medication 10 ML: at 13:40

## 2019-11-27 NOTE — OP NOTES
Gynecologic Oncology Operative Report    Jeromy Coronado  11/27/2019    Pre-operative dx:  Endometrial cancer     Post-operative dx:  Endometrial cancer     Procedure:  1) Robotic-assisted total laparoscopic hysterectomy     2) Bilateral salpingooophorectomy     3) Laparoscopic sentinel pelvic and paraaortic lymph node dissection     Surgeon:  Rodney Davalos MD     Assistant:  Leslee Sher PA-C     Anesthesia:  GETA     EBL:  25 cc     Complications:  None     Specimens:       ID Type Source Tests Collected by Time Destination   1 : left pelvic sentinel lymph node Fresh Lymph Node   Brandee Nam MD 11/27/2019 4886 Pathology   2 : right pelvic sentinel lymph node Fresh Lymph Node   Brandee Nam MD 11/27/2019 0825 Pathology   3 : right jose aortic sentinel lymph node Fresh Lymph Node   Brandee Nam MD 11/27/2019 2973 Pathology   4 : UTERUS, CERVIX, BILATERAL FALLOPIAN TUBES AND OVARIES Fresh Uterus with Bilateral Fallopian tubes and Ovaries Ji Domingo MD 11/27/2019 8825 Pathology   1 : pelvic washings Fresh Pelvis   Brandee Nam MD 11/27/2019 0826 Cytolo     Operative indications:  67 yo WF with poorly differentiated endometrial cancer, without evidence of metastatic disease. I recommended robotic hysterectomy with sentinel lymph node dissection. Operative findings:  Normal appearing uterus, tubes, and ovaries. No evidence of peritoneal disease. [place] nodes identified bilaterally, including right sided paraaortics. Normal omentum. Procedure in detail: After the risks, benefits, indications, and alternatives of the procedure were discussed with the patient and informed consent was obtained, the patient was taken to the operating room. She was positively identified, administered general anesthesia, and then placed in the dorsal lithotomy position in [address] [place] [address]. An exam under anesthesia was performed. She was then prepped and draped in the usual fashion. A hensley catheter was inserted.   An open-sided speculum was used to visualize the cervix. The cervix was grasped with a single-tooth tenaculum and then progressively dilated using cervical dilators. Indocyanine green was then injected on either side of the cervix in preparation for sentinel lymph node mapping. I then placed a MLW Squared-Topadmit uterine manipulator. We then proceeded with laparoscopy. A horizontal incision was made in the midline above the level of the umbilicus. A Veres needle was inserted into the peritoneal cavity and the placement was confirmed. The abdomen was then insufflated to a pressure of 15 mm Hg. An 8 mm da Nadia trocar was then inserted at this site. The camera was then inserted to confirm proper placement. Three additional 8 mm da Nadia trocars were then placed under direct visualization, two on the right, and one on the left. These were placed approximately 8 cm apart across the upper abdomen. An 8 mm Airseal assistant port was then placed in the left abdomen, 8 cm from the lateral da Nadia trocar. Positioning of the trocars in the abdominal wall were then adjusted to locate the point of articulation at the level of the fascia. The patient was then placed in steep Trendelenberg position. The camera port was then docked and the camera was inserted into the #2 port and advanced. The remaining trocars were then docked with the [employer]. A fenestrated bipolar grasper was placed in arm #1, monopolar scissors placed in arm #3, and a Prograsp placed in arm #4. I then removed my gown and made my way to the console. The abdomen and pelvis were then examined, with the above mentioned findings noted. Pelvic washings were obtained as well. We then proceeded with the hysterectomy. The left round ligament was identified, then coagulated and transected with the cautery, allowing entry into the retroperitoneal space.  The vesico-uterine peritoneum was divided with cauteryl from the left side across the midline, allowing visualization of the ring of the V-Care manipulator anteriorly. We then used the near-infrared filter on the da Nadia camera to visualize the retroperitoneum and identify the sentinel lymph node(s). The dissection was then carried out using a combination of sharp and blunt dissection and cautery as needed for hemostasis. We then directed our attention to the right side of the pelvis. The right round ligament was identified and then coagulated and transected with cautery, allowing entry into the retroperitoneal space. The remaining vesico-uterine peritoneum was then divided with on the right side. We then used the near-infrared filter on the da Nadia camera to visualize the retroperitoneum and identify the sentinel lymph node(s). The dissection was then carried out using a combination of sharp and blunt dissection and cautery as needed for hemostasis. The dissection was continued cephalad following identification of a right-sided paraaortic sentinel node. We then moved back to the left side of the pelvis. We identified the left ureter and bluntly developed the perirectal space. The left uterine artery was identified at its origin off of the hypogastric artery, and it was coagulated with bipolar cautery at that point, with the ureter being held on traction medially to ensure its safety. The left ovarian vessels were then isolated and then coagulated and transected with cautery. The posterior leaf of the broad ligament was then divided with the cautery up to the level of the uterine body. We then moved back to the right side of the pelvis. The right ureter was then identified and the perirectal space was bluntly developed. The right uterine artery was then identified at its origin off of the hypogastric artery. It was coagulated with bipolar cautery at that point, with the ureter being held on traction medially to ensure its safety.  The right ovarian vessels were then isolated and then coagulated and transected with cautery. The posterior leaf of the broad ligament was then divided with the Harmonic Scalpel up to the level of the uterine body. We then moved anteriorly and the bladder was sharply dissected off of the lower uterine segment and cervix until it was well below the ring of the [employer]. The uterine arteries were then skeletonized bilaterally and then coagulated with bipolar cautery and transected with monopolar cautery at the level of the V-Care ring. A circumferential colpotomy was then performed by using the monopolar scissors to cut down onto the V-Care ring. Once the colpotomy was completed, the specimen was delivered transvaginally and sent to pathology. A bulb syringe was then placed into the vagina to maintain pneumoperitoneum for vaginal cuff closure. The scissors were then removed from arm #3 and replaced with a EcoScraps needle . The cuff was then reapproximated with a running closure using a 2-0 Stratafix suture. Excellent reapproximation and hemostasis was noted. The pelvis was then irrigated and all pedicles inspected. Once satisfied with hemostasis, the gas was then allowed to escape from the abdomen and the trochars were removed. She was then taken out of Trendelenburg tilt. The incision sites were closed with a stitch of 4-0 Monocryl, followed by a layer of Dermabond. The bulb syringe was then removed from the vagina. The patient was taken out of stirrups, awakened from anesthesia, and taken to the recovery room in stable condition. All instrument, sponge, and needle counts were correct.      Jayshree Yoo MD  11/27/2019  9:01 AM

## 2019-11-27 NOTE — ANESTHESIA POSTPROCEDURE EVALUATION
Post-Anesthesia Evaluation and Assessment    Patient: Maira Palm MRN: [id number]  SSN: [id number]    YOB: 1950  Age: 68 y.o. Sex: female      I have evaluated the patient and they are stable and ready for discharge from the PACU. Cardiovascular Function/Vital Signs  Visit Vitals  /74   Pulse (!) 57   Temp 36.7 °C (98 °F)   Resp 14   Ht 5' 7\" (1.702 m)   Wt 75.3 kg (166 lb)   SpO2 96%   BMI 26.00 kg/m²       Patient is status post General anesthesia for Procedure(s):  ROBOTIC ASSISTED TOTAL LAPAROSCOPIC HYSTERECTOMY , BILATERAL SALPINGO OOPHORECTOMY, PELVIC AND PARA-AORTIC LYMPH NODE DISSECTION. Nausea/Vomiting: None    Postoperative hydration reviewed and adequate. Pain:  Pain Scale 1: Visual (11/27/19 0945)  Pain Intensity 1: 0 (11/27/19 0945)   Managed    Neurological Status:   Neuro (WDL): Within Defined Limits (11/27/19 0945)  Neuro  LUE Motor Response: Purposeful (11/27/19 0945)  LLE Motor Response: Purposeful (11/27/19 0945)  RUE Motor Response: Purposeful (11/27/19 0945)  RLE Motor Response: Purposeful (11/27/19 0945)   At baseline    Mental Status, Level of Consciousness: Alert and  oriented to person, place, and time    Pulmonary Status:   O2 Device: Room air (11/27/19 0945)   Adequate oxygenation and airway patent    Complications related to anesthesia: None    Post-anesthesia assessment completed.  No concerns    Signed By: Ace Veliz MD     November 27, 2019

## 2019-11-27 NOTE — DISCHARGE INSTRUCTIONS
[address]  P [phone]  F [phone]     Jose F Ordonez      Dear MsSyed Kaitlin Ford,      Please review your instructions with your nurse and ask any questions so you have all the information you need to recover well at home. If you do not feel you have everything you need to succeed and be safe after you leave the hospital, please discuss these concerns with your nurse. As always, call for any questions at home. Your doctor: Matilda Hazel MD  Diagnosis: [facility] [C54.1]  Procedure: Procedure(s):  ROBOTIC ASSISTED TOTAL LAPAROSCOPIC HYSTERECTOMY , BILATERAL SALPINGO OOPHORECTOMY, PELVIC AND PARA-AORTIC LYMPH NODE DISSECTION  Date of Discharge: 11/28/19      Take Home Medications     See Discharge Medication Review provided to you by your nurse. If you did not receive one, request this prior to your discharge. · It is important that you take your medications as they are prescribed. · Keep your medications in the bottles provided by the pharmacist and keep a list of the medication names, dosages, times to be taken and what they are for in your wallet. · Do not take other medications without consulting your doctor. · You may take acetaminophen (Tylenol) and an NSAID such as ibuprofen (Advil) or Aleve for pain (but not both). If this is not sufficient for your pain, take tramadol or other pain medication you were provided. · You should take a daily gentle stool softener such as a colace pill or dulcolax suppository for constipation as this is not uncommon after surgery and/or while on pain medication. If not prescribed, this can be found over the counter. If constipation persists for >24 hours you should take a dose of Milk of Magnesia. Call if your constipation continues. Diet    · Stay hydrated and drink fluids such as gatorade and water.  This will also help prevent constipation and dehydration. Limit somewhat any usual caffeine intake of beverages such as soda, tea and coffee as this may serve to dehydrate you. · For the first 2-3 days keep a low fiber diet avoiding raw vegetables or fruits with skin. A diet consisting of soup, cereal, yogurt, eggs, fish, Boost/Ensure. Avoid fatty/greasy foods. · If nauseated, keep your diet limited to liquids and call if this persists. Activity    · If possible, have someone with you at all times until you feel stable. · Gradually increase your activity each day. There are generally no restrictions on walking, climbing stairs or riding in a car. Walk at least 4 times per day. Walking will help reduce the risk of blood clots and constipation. · Showers are okay. If you have an incision, no tub bathing/swimming for two weeks. · No driving for 2 week and/or while on pain medication. · The following restrictions apply:  1. No heavy lifting greater than 15 lbs for 4 weeks, then 25 lbs for the next 4 weeks. 2. If you had any vaginal procedure or a hysterectomy, nothing per vagina for 6-8 weeks. 3. You may experience vaginal spotting. Should the bleeding require more that 4 pads a day please call our office. Incision    · You should expect some discomfort in the area of your incision, particularly as you increase your activity. If you notice an area of increasing redness or new drainage, please call your doctor. · Many incisions will have buried, absorbable sutures, which do not need to be removed and are covered by protective glue. This will come off over time. Causes For Concern    If any of the following occur, please call our office and speak with the Nurse/aid who will help you with your problem or ask the doctor to call you.  Problems with the incision, including increasing pain, swelling, redness or drainage.  Inability to pass urine    Increasing abdominal pain despite medication   Persisting nausea or vomiting.  Fever or chills and a temperature >101F   Constipation (no bowel movement for three days).  Diarrhea (more than three watery stools within 24 hours).  Excessive vaginal or wound bleeding.  If after hours and you cannot reach an on-call physician, call 911. Follow-Up    Call [phone] to schedule an appointment with Dinesh Howard MD in 4 weeks. Information obtained by :  I understand that if any problems occur once I am at home I am to contact my physician. I understand and acknowledge receipt of the instructions indicated above.                                                                                                                                            Physician's or R.N.'s Signature                                                                  Date/Time                                                                                                                                              Patient or Representative Signature                                                          Date/Time

## 2019-11-27 NOTE — BRIEF OP NOTE
BRIEF OPERATIVE NOTE    Date of Procedure: 11/27/2019   Preoperative Diagnosis: ENDOMETRIAL CANCER  Postoperative Diagnosis: ENDOMETRIAL CANCER    Procedure(s):  ROBOTIC ASSISTED TOTAL LAPAROSCOPIC HYSTERECTOMY , BILATERAL SALPINGO OOPHORECTOMY, PELVIC AND PARA-AORTIC LYMPH NODE DISSECTION  Surgeon(s) and Role:     Ayesha Coffey MD - Primary         Surgical Assistant: Nigel Rhoades PA-C    Surgical Staff:  Circ-1: Bobbi Collado RN  Physician Assistant: Karyle Canner, PA  Scrub Tech-1: Sarah Hopson  Float Staff: Virginia Graf  Event Time In Time Out   Incision Start 0815    Incision Close       Anesthesia: General   Estimated Blood Loss: 25 cc  Specimens:   ID Type Source Tests Collected by Time Destination   1 : left pelvic sentinel lymph node Fresh Lymph Node  Mae Kendrick MD 11/27/2019 4724 Pathology   2 : right pelvic sentinel lymph node Fresh Lymph Node  Mae Kendrick MD 11/27/2019 0825 Pathology   3 : right jose aortic sentinel lymph node Fresh Lymph Node  Mae Kendrick MD 11/27/2019 5179 Pathology   4 : UTERUS, CERVIX, BILATERAL FALLOPIAN TUBES AND OVARIES Fresh Uterus with Bilateral Fallopian tubes and Ovaries  Mae Kendrick MD 11/27/2019 5903 Pathology   1 : pelvic washings Fresh Pelvis  Mae Kendrick MD 11/27/2019 2567 Cytology      Findings: Normal appearing uterus, tubes, and ovaries. No evidence of peritoneal disease. [place] nodes identified bilaterally, including right sided paraaortics.    Complications: None  Implants: * No implants in log *    Adrianne Sweeney MD

## 2019-11-27 NOTE — PROGRESS NOTES
TRANSFER - IN REPORT:    Verbal report received from Olivia RN(name) on Georgie Bone  being received from PACU(unit) for routine progression of care      Report consisted of patients Situation, Background, Assessment and   Recommendations(SBAR). Information from the following report(s) SBAR, Kardex, OR Summary, Procedure Summary, Intake/Output and MAR was reviewed with the receiving nurse. Opportunity for questions and clarification was provided. Assessment completed upon patients arrival to unit and care assumed.

## 2019-11-27 NOTE — ROUTINE PROCESS
Patient: Frederica Mortimer MRN: [id number]  SSN: [id number]   YOB: 1950  Age: 68 y.o. Sex: female     Patient is status post Procedure(s):  ROBOTIC ASSISTED TOTAL LAPAROSCOPIC HYSTERECTOMY , BILATERAL SALPINGO OOPHORECTOMY, PELVIC AND PARA-AORTIC LYMPH NODE DISSECTION.     Surgeon(s) and Role:     Wesley Logan MD - Primary    Local/Dose/Irrigation:  SEE MAR                  Peripheral IV 11/27/19 Left Forearm (Active)       Peripheral IV 11/27/19 Right Forearm (Active)                           Dressing/Packing:  Wound Abdomen 5 TROCAR SITES-Dressing Type: Topical skin adhesive/glue (11/27/19 0907)  Wound Vagina-Dressing Type: Silke-pad (11/27/19 1980)    Splint/Cast:  ]    Other:  NA

## 2019-11-27 NOTE — ANESTHESIA PREPROCEDURE EVALUATION
Anesthetic History     PONV          Review of Systems / Medical History  Patient summary reviewed, nursing notes reviewed and pertinent labs reviewed    Pulmonary                   Neuro/Psych              Cardiovascular    Hypertension                Comments: Sinus yi   GI/Hepatic/Renal     GERD: well controlled      PUD     Endo/Other        Arthritis     Other Findings              Physical Exam    Airway  Mallampati: II  TM Distance: 4 - 6 cm  Neck ROM: normal range of motion   Mouth opening: Normal     Cardiovascular    Rhythm: regular  Rate: normal         Dental  No notable dental hx       Pulmonary  Breath sounds clear to auscultation               Abdominal  Abdominal exam normal       Other Findings            Anesthetic Plan    ASA: 2  Anesthesia type: general          Induction: Intravenous  Anesthetic plan and risks discussed with: Patient

## 2019-11-28 VITALS
DIASTOLIC BLOOD PRESSURE: 69 MMHG | RESPIRATION RATE: 14 BRPM | TEMPERATURE: 98 F | HEART RATE: 63 BPM | WEIGHT: 166 LBS | HEIGHT: 67 IN | OXYGEN SATURATION: 97 % | BODY MASS INDEX: 26.06 KG/M2 | SYSTOLIC BLOOD PRESSURE: 123 MMHG

## 2019-11-28 LAB
ANION GAP SERPL CALC-SCNC: 5 MMOL/L (ref 5–15)
BUN SERPL-MCNC: 14 MG/DL (ref 6–20)
BUN/CREAT SERPL: 13 (ref 12–20)
CALCIUM SERPL-MCNC: 8.4 MG/DL (ref 8.5–10.1)
CHLORIDE SERPL-SCNC: 102 MMOL/L (ref 97–108)
CO2 SERPL-SCNC: 27 MMOL/L (ref 21–32)
CREAT SERPL-MCNC: 1.05 MG/DL (ref 0.55–1.02)
ERYTHROCYTE [DISTWIDTH] IN BLOOD BY AUTOMATED COUNT: 12.8 % (ref 11.5–14.5)
GLUCOSE SERPL-MCNC: 154 MG/DL (ref 65–100)
HCT VFR BLD AUTO: 34.7 % (ref 35–47)
HGB BLD-MCNC: 11.3 G/DL (ref 11.5–16)
MCH RBC QN AUTO: 30.7 PG (ref 26–34)
MCHC RBC AUTO-ENTMCNC: 32.6 G/DL (ref 30–36.5)
MCV RBC AUTO: 94.3 FL (ref 80–99)
NRBC # BLD: 0 K/UL (ref 0–0.01)
NRBC BLD-RTO: 0 PER 100 WBC
PLATELET # BLD AUTO: 266 K/UL (ref 150–400)
PMV BLD AUTO: 9.4 FL (ref 8.9–12.9)
POTASSIUM SERPL-SCNC: 3.8 MMOL/L (ref 3.5–5.1)
RBC # BLD AUTO: 3.68 M/UL (ref 3.8–5.2)
SODIUM SERPL-SCNC: 134 MMOL/L (ref 136–145)
WBC # BLD AUTO: 12.7 K/UL (ref 3.6–11)

## 2019-11-28 PROCEDURE — 85027 COMPLETE CBC AUTOMATED: CPT

## 2019-11-28 PROCEDURE — 36415 COLL VENOUS BLD VENIPUNCTURE: CPT

## 2019-11-28 PROCEDURE — 74011250636 HC RX REV CODE- 250/636: Performed by: OBSTETRICS & GYNECOLOGY

## 2019-11-28 PROCEDURE — 74011000258 HC RX REV CODE- 258: Performed by: OBSTETRICS & GYNECOLOGY

## 2019-11-28 PROCEDURE — 99218 HC RM OBSERVATION: CPT

## 2019-11-28 PROCEDURE — 80048 BASIC METABOLIC PNL TOTAL CA: CPT

## 2019-11-28 RX ORDER — TRAMADOL HYDROCHLORIDE 50 MG/1
50 TABLET ORAL
Qty: 20 TAB | Refills: 0 | Status: SHIPPED | OUTPATIENT
Start: 2019-11-28 | End: 2019-12-05

## 2019-11-28 RX ADMIN — KETOROLAC TROMETHAMINE 15 MG: 30 INJECTION, SOLUTION INTRAMUSCULAR at 04:07

## 2019-11-28 RX ADMIN — ACETAMINOPHEN 1000 MG: 10 INJECTION, SOLUTION INTRAVENOUS at 06:58

## 2019-11-28 RX ADMIN — KETOROLAC TROMETHAMINE 15 MG: 30 INJECTION, SOLUTION INTRAMUSCULAR at 09:07

## 2019-11-28 RX ADMIN — SODIUM CHLORIDE 100 ML/HR: 900 INJECTION, SOLUTION INTRAVENOUS at 06:45

## 2019-11-28 RX ADMIN — ACETAMINOPHEN 1000 MG: 10 INJECTION, SOLUTION INTRAVENOUS at 00:01

## 2019-11-28 RX ADMIN — Medication 10 ML: at 09:03

## 2019-11-28 RX ADMIN — CEFAZOLIN 1 G: 1 INJECTION, POWDER, FOR SOLUTION INTRAMUSCULAR; INTRAVENOUS; PARENTERAL at 00:06

## 2019-11-28 NOTE — PROGRESS NOTES
Bedside and Verbal shift change report given to VAZQUEZ ToussaintRN (oncoming nurse) by Finley Dakins. Karlene Walker RN (offgoing nurse). Report included the following information SBAR, Kardex, MAR and Recent Results.

## 2019-11-28 NOTE — PROGRESS NOTES
Problem: Falls - Risk of  Goal: *Absence of Falls  Description  Document Trevin Moya Fall Risk and appropriate interventions in the flowsheet.   11/28/2019 1132 by Gurmeet Marques RN  Outcome: Resolved/Met  Note: Fall Risk Interventions:            Medication Interventions: Teach patient to arise slowly    Elimination Interventions: Call light in reach, Patient to call for help with toileting needs           11/28/2019 1132 by Gurmeet Marques RN  Outcome: Progressing Towards Goal  Note: Fall Risk Interventions:            Medication Interventions: Teach patient to arise slowly    Elimination Interventions: Call light in reach, Patient to call for help with toileting needs              Problem: Patient Education: Go to Patient Education Activity  Goal: Patient/Family Education  11/28/2019 1132 by Gurmeet Marques RN  Outcome: Resolved/Met  11/28/2019 1132 by Gurmeet Marques RN  Outcome: Progressing Towards Goal     Problem: Vaginal Hysterectomy: Post-Op Day 1/Day of Discharge  Goal: Off Pathway (Use only if patient is Off Pathway)  11/28/2019 1132 by Gurmeet Marques RN  Outcome: Resolved/Met  11/28/2019 1132 by Gurmeet Marques RN  Outcome: Progressing Towards Goal  Goal: Activity/Safety  11/28/2019 1132 by Gurmeet Marques RN  Outcome: Resolved/Met  11/28/2019 1132 by Gurmeet Marques RN  Outcome: Progressing Towards Goal  Goal: Consults, if ordered  11/28/2019 1132 by Gurmeet Marques RN  Outcome: Resolved/Met  11/28/2019 1132 by Gurmeet Marques RN  Outcome: Progressing Towards Goal  Goal: Diagnostic Test/Procedures  11/28/2019 1132 by Gurmeet Marques RN  Outcome: Resolved/Met  11/28/2019 1132 by Gurmeet Marques RN  Outcome: Progressing Towards Goal  Goal: Nutrition/Diet  11/28/2019 1132 by Gurmeet Marques RN  Outcome: Resolved/Met  11/28/2019 1132 by Gurmeet Marques RN  Outcome: Progressing Towards Goal  Goal: Discharge Planning  11/28/2019 1132 by Gurmeet Marques RN  Outcome: Resolved/Met  11/28/2019 1132 by Gurmeet Marques RN  Outcome: Progressing Towards Goal  Goal: Medications  11/28/2019 1132 by Tristan Murphy RN  Outcome: Resolved/Met  11/28/2019 1132 by Tristan Murphy RN  Outcome: Progressing Towards Goal  Goal: Respiratory  11/28/2019 1132 by Tristan Murphy RN  Outcome: Resolved/Met  11/28/2019 1132 by Tritsan Murphy RN  Outcome: Progressing Towards Goal  Goal: Treatments/Interventions/Procedures  11/28/2019 1132 by Tristan Murphy RN  Outcome: Resolved/Met  11/28/2019 1132 by Tristan Murphy RN  Outcome: Progressing Towards Goal  Goal: Psychosocial  11/28/2019 1132 by Tristan Murphy RN  Outcome: Resolved/Met  11/28/2019 1132 by Tristan Murphy RN  Outcome: Progressing Towards Goal     Problem: Discharge Planning  Goal: *Discharge to safe environment  11/28/2019 1132 by Tristan Murphy RN  Outcome: Resolved/Met  11/28/2019 1132 by Tristan Murphy RN  Outcome: Progressing Towards Goal  Goal: *Knowledge of medication management  11/28/2019 1132 by Tristan Murphy RN  Outcome: Resolved/Met  11/28/2019 1132 by Tristan Murphy RN  Outcome: Progressing Towards Goal  Goal: *Knowledge of discharge instructions  11/28/2019 1132 by Tristan Murphy RN  Outcome: Resolved/Met  11/28/2019 1132 by Tristan Murphy RN  Outcome: Progressing Towards Goal     Problem: Patient Education: Go to Patient Education Activity  Goal: Patient/Family Education  11/28/2019 1132 by Tristan Murphy RN  Outcome: Resolved/Met  11/28/2019 1132 by Tristan Murphy RN  Outcome: Progressing Towards Goal     Problem: Pain  Goal: *Control of Pain  11/28/2019 1132 by Tristan Murphy RN  Outcome: Resolved/Met  11/28/2019 1132 by Tristan Murphy RN  Outcome: Progressing Towards Goal     Problem: Patient Education: Go to Patient Education Activity  Goal: Patient/Family Education  11/28/2019 1132 by Tristan Murphy RN  Outcome: Resolved/Met  11/28/2019 1132 by Tristan Murphy RN  Outcome: Progressing Towards Goal

## 2019-11-28 NOTE — PROGRESS NOTES
Bedside and Verbal shift change report given to [id number] (oncoming nurse) by Breana RN (offgoing nurse). Report included SBAR, Kardex, Intake/Output, MAR, Recent Results and Progress of Care. I accept this patient to my care at this time. Any subsequent documentation in this Progress Note is intended to be information adjunct to other components of the patient's electronic medical record. Refer to accompanying timestamp(s) for chronology. I have reviewed discharge instructions with the patient. Her physician has sent her prescription for tramadol to the pharmacy listed on her account. She is aware of this. I reviewed activity restrictions, diet recommendations, follow-up instructions, and indications to return to the emergency room. The patient verbalized understanding. She is comfortable with going home at this time. She is ambulating well with no trouble. Vital signs stable. Her incisions are well approximated, secured with dermabond, and without erythema or exudate. She is voiding. She is up ad marcelo. No apparent distress.

## 2019-11-28 NOTE — PROGRESS NOTES
Bedside and Verbal shift change report given to HENRIETTA Feng (oncoming nurse) by Alis Jameson RN (offgoing nurse). Report included the following information SBAR, Kardex, OR Summary, Procedure Summary, Intake/Output, MAR, Accordion and Recent Results.

## 2019-11-28 NOTE — PROGRESS NOTES
[facility] GYNECOLOGIC ONCOLOGY  [address] [facility], [address], [address]  P [phone]  F [phone]       Patient Name: Keegan Zepeda   Admit Date: 11/27/2019   OR Date: 11/27/2019   Visit Date: 11/28/2019        SUBJECTIVE:    No complaints this morning. Pain well controlled. OBJECTIVE:    Patient Vitals for the past 24 hrs:   Temp Pulse Resp BP SpO2   11/28/19 0404 98 °F (36.7 °C) 66 16 108/59 97 %   11/28/19 0001 98.6 °F (37 °C) 62 16 113/62 98 %   11/27/19 2013 98.3 °F (36.8 °C) 85 16 129/77 97 %   11/27/19 1433 97.4 °F (36.3 °C) 76 16 146/74 97 %   11/27/19 1332 97.4 °F (36.3 °C) 65 16 138/77 99 %   11/27/19 1230 97.4 °F (36.3 °C) 60 16 139/83 97 %   11/27/19 1124 97.4 °F (36.3 °C) 62 15 145/70 96 %   11/27/19 1030 -- (!) 58 14 134/75 100 %   11/27/19 1015 -- (!) 54 14 135/69 95 %   11/27/19 1000 -- (!) 57 14 119/74 96 %   11/27/19 0945 98 °F (36.7 °C) (!) 59 14 121/71 96 %   11/27/19 0930 -- 62 19 128/68 98 %   11/27/19 0925 -- 63 24 127/58 98 %   11/27/19 0920 -- 64 19 144/75 99 %   11/27/19 0919 97.5 °F (36.4 °C) 64 16 124/73 100 %       Date 11/27/19 0700 - 11/28/19 0659 11/28/19 0700 - 11/29/19 0659   Shift 9834-1311 8477-9899 24 Hour Total 4684-8797 6571-4398 24 Hour Total   INTAKE   P.O.  480 480        P. O.  480 480      I. V.(mL/kg/hr) 800(0.9) 1401.7(1.6) 2201.7(1.2)        Volume (0.9% sodium chloride infusion)  1401.7 1401.7        Volume (lactated Ringers infusion) 800  800      Shift Total(mL/kg) 800(10.6) 1881.7(25) 2681. 7(35.6)      OUTPUT   Urine(mL/kg/hr) 350(0.4) 650(0.7) 1000(0.6)        Urine Output 50  50        Urine Output (mL) ([REMOVED] Urinary Catheter 11/27/19) 300 650 950      Blood 10  10        Estimated Blood Loss 10  10      Shift Total(mL/kg) 360(4.8) 650(8.6) 1010(13.4)       1231.7 1671.7      Weight (kg) 75.3 75.3 75.3 75.3 75.3 75.3       Physical Exam     General:  alert, cooperative, no distress     Cardiac:  Regular rate and rhythm Lungs:  clear to auscultation bilaterally  Abdomen:  soft, non-tender, without masses or organomegaly       Wound:  clean, dry, no drainage   Extremity: extremities normal, atraumatic, no cyanosis or edema    Data Review  Lab Results   Component Value Date/Time    WBC 12.7 (H) 11/28/2019 04:16 AM    ABS. NEUTROPHILS 4.7 11/20/2019 11:59 AM    HGB 11.3 (L) 11/28/2019 04:16 AM    HCT 34.7 (L) 11/28/2019 04:16 AM    MCV 94.3 11/28/2019 04:16 AM    MCH 30.7 11/28/2019 04:16 AM    PLATELET 315 78/59/8417 04:16 AM     Lab Results   Component Value Date/Time    Sodium 134 (L) 11/28/2019 04:16 AM    Potassium 3.8 11/28/2019 04:16 AM    Chloride 102 11/28/2019 04:16 AM    CO2 27 11/28/2019 04:16 AM    Glucose 154 (H) 11/28/2019 04:16 AM    BUN 14 11/28/2019 04:16 AM    Creatinine 1.05 (H) 11/28/2019 04:16 AM    Calcium 8.4 (L) 11/28/2019 04:16 AM    Albumin 3.8 11/20/2019 11:59 AM    Bilirubin, total 0.7 11/20/2019 11:59 AM    AST (SGOT) 16 11/20/2019 11:59 AM    ALT (SGPT) 27 11/20/2019 11:59 AM    Alk. phosphatase 76 11/20/2019 11:59 AM     IMPRESSION/PLAN:    1 Day Post-Op Procedure(s):  ROBOTIC ASSISTED TOTAL LAPAROSCOPIC HYSTERECTOMY , BILATERAL SALPINGO OOPHORECTOMY, PELVIC AND PARA-AORTIC LYMPH NODE DISSECTION for ENDOMETRIAL CANCER    Oncologic:  High-grade endometrial cancer. No obvious metastatic disease. Await final pathology. Her sister also had an advanced endometrial cancer. Will plan on genetic testing at some point. Heme/CV:  Hemodynamically stable. Renal:  Good UOP. Creatinine OK. FEN/GI:  Diet as tolerated. ID/Wound:  Afebrile. PPX:  Ambulation, SCDs, IS. Dispostion:  Doing well postoperatively. Will plan on d/c home today.         Meme Guerrero MD

## 2019-11-28 NOTE — PROGRESS NOTES
Bedside and Verbal shift change report given to KAMILAH Woody RN (oncoming nurse) by Chao Mckeon RN (offgoing nurse). Report included the following information SBAR, Kardex, OR Summary, Procedure Summary, Intake/Output, MAR, Accordion and Recent Results.

## 2019-12-03 ENCOUNTER — TELEPHONE (OUTPATIENT)
Dept: GYNECOLOGY | Age: 69
End: 2019-12-03

## 2019-12-03 ENCOUNTER — OFFICE VISIT (OUTPATIENT)
Dept: GYNECOLOGY | Age: 69
End: 2019-12-03

## 2019-12-03 VITALS
SYSTOLIC BLOOD PRESSURE: 176 MMHG | HEIGHT: 67 IN | HEART RATE: 66 BPM | BODY MASS INDEX: 27.15 KG/M2 | WEIGHT: 173 LBS | DIASTOLIC BLOOD PRESSURE: 91 MMHG

## 2019-12-03 DIAGNOSIS — C54.1 ENDOMETRIAL CANCER (HCC): Primary | ICD-10-CM

## 2019-12-03 RX ORDER — ACETAMINOPHEN 325 MG/1
TABLET ORAL
COMMUNITY
End: 2020-01-13 | Stop reason: ALTCHOICE

## 2019-12-03 RX ORDER — CHOLECALCIFEROL (VITAMIN D3) 125 MCG
CAPSULE ORAL
COMMUNITY

## 2019-12-03 RX ORDER — CEPHALEXIN 500 MG/1
CAPSULE ORAL
Refills: 0 | COMMUNITY
Start: 2019-11-29 | End: 2019-12-31 | Stop reason: ALTCHOICE

## 2019-12-03 NOTE — PROGRESS NOTES
[address] [facility] [place] [address] [facility]  P [phone]  F [phone]    Postoperative Office Note  Patient ID:  Name: Kaitlin Ford  MRM: [id number]  : 1950/69 y.o. Date: 12/3/2019    Diagnosis:     ICD-10-CM ICD-9-CM    1. 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 female who presents for followup postoperative care following robotic-assisted TLH, BSO, SP&PALND. The patient's pathology revealed: FINAL PATHOLOGIC DIAGNOSIS   1. Lymph node, left pelvic, sentinel node excision:   No evidence for malignancy in one node (0/1). See comment. 2. Lymph node, right pelvic, sentinel node excision:   No evidence for malignancy in one node (0/1). See comment. 3. Lymph node, right periaortic, sentinel node excision:   No evidence for malignancy in one node (0/1). See comment. 4. Uterus, cervix, fallopian tubes, ovaries, hysterectomy, salpingo-oophorectomy:   Cervix: No evidence for dysplasia or malignancy. Endometrium: Endometrioid adenocarcinoma, FIGO grade 3. See synoptic report. Myometrium: Invasive endometrioid adenocarcinoma. Leiomyoma. Fallopian tubes: Fibrous atrophy. Ovaries: Endosalpingosis. Endometrium uterus endometrium - ENDOMETRIUM   SPECIMEN   Procedure:  Total hysterectomy and bilateral salpingo-oophorectomy   TUMOR   Histologic Type: Endometrioid carcinoma, NOS   Histologic Grade: FIGO grade 3   Myometrial Invasion: Present   Depth of Invasion (Millimeters): 6 mm   Myometrial Thickness (Millimeters): 16 mm   Percentage of Myometrial Invasion: <50 %   Uterine Serosa Involvement: Not identified   Cervical Stromal Involvement: Not identified   Other Tissue / Organ Involvement: Not identified   Lymphovascular Invasion: Not identified   MARGINS - Uninvolved by tumor   LYMPH NODES   Lymph Node Status: All lymph nodes negative for tumor cells   Total Number of Pelvic Nodes Examined: 2   Number of Pelvic [place] Nodes Examined: 2   Total Number of Para-aortic Nodes Examined: 1   Number of Para-aortic [place] Nodes Examined: 1   Pathologic Stage   Primary Tumor (pT): pT1a   Regional Lymph Nodes (pN): pN0   Comment   Immunohistochemistry stains with antibodies directed against pan keratin are [negative] in the sentinel lymph nodes from parts #1 through #3. She called me multiple times over the Thanksgiving weekend with concerns about her incisions. She presents today for evaluation. Medications:     Current Outpatient Medications on File Prior to Visit   Medication Sig Dispense Refill    cephALEXin (KEFLEX) 500 mg capsule TK ONE C PO QID  0    naproxen sodium (ALEVE) 220 mg cap Take  by mouth.  acetaminophen (TYLENOL) 325 mg tablet Take  by mouth every four (4) hours as needed for Pain.  SF 5000 PLUS 1.1 % crea TOOTHPASTE  4    Minoxidil (ROGAINE) 5 % foam by Apply Externally route.  timolol (TIMOPTIC-XE) 0.25 % ophthalmic gel-forming Administer 1 Drop to both eyes daily. 0    Omega-3 Fatty Acids 300 mg cap Take 3 Tabs by mouth daily.  multivitamin (ONE A DAY) tablet Take 1 Tab by mouth daily. OR AREDS      cholecalciferol (VITAMIN D3) 1,000 unit tablet Take 1,000 Units by mouth daily.  traMADol (ULTRAM) 50 mg tablet Take 1 Tab by mouth every six (6) hours as needed for Pain for up to 7 days.  Max Daily Amount: 200 mg. 20 Tab 0     No current facility-administered medications on file prior to visit. Allergies:      Allergies   Allergen Reactions    Aspirin Other (comments)     heartburn     Past Medical History:   Diagnosis Date    BCC (basal cell carcinoma)     Cancer (HCC) 10/2019    UTERINE    Degenerative joint disease (DJD) of lumbar spine     GERD (gastroesophageal reflux disease)     Glaucoma     Headache     Nausea & vomiting     PUD (peptic ulcer disease)     \"MANY YEARS AGO\"    Rheumatic fever     AS CHILD    Sinus bradycardia by electrocardiogram 2019     Past Surgical History:   Procedure Laterality Date    HX COLPOSCOPY      abn pap    HX HEENT      SHAMA CATARACTS    HX HIP REPLACEMENT      x2    HX HYSTERECTOMY  2019    Dr. Jeffry Smith HX ORTHOPAEDIC      SHMAA HIP REPLACEMENT    HX ORTHOPAEDIC      LEFT ELBOW FOR FX X2    HX OTHER SURGICAL      DOG BIT SURGERY ON RIGHT HAND    HX WISDOM TEETH EXTRACTION       Social History     Socioeconomic History    Marital status:      Spouse name: Not on file    Number of children: Not on file    Years of education: Not on file    Highest education level: Not on file   Occupational History    Not on file   Social Needs    Financial resource strain: Not on file    Food insecurity:     Worry: Not on file     Inability: Not on file    Transportation needs:     Medical: Not on file     Non-medical: Not on file   Tobacco Use    Smoking status: Former Smoker     Packs/day: 0.50     Years: 20.00     Pack years: 10.00     Last attempt to quit: 10/6/1978     Years since quittin.1    Smokeless tobacco: Never Used    Tobacco comment: SOMOKE OFF AND ON   Substance and Sexual Activity    Alcohol use: Yes     Comment: RARELY    Drug use: Never    Sexual activity: Not Currently     Comment:   retired   Lifestyle    Physical activity:     Days per week: Not on file     Minutes per session: Not on file    Stress: Not on file   Relationships    Social connections:     Talks on phone: Not on file     Gets together: Not on file     Attends Episcopal service: Not on file     Active member of club or organization: Not on file     Attends meetings of clubs or organizations: Not on file     Relationship status: Not on file    Intimate partner violence:     Fear of current or ex partner: Not on file     Emotionally abused: Not on file     Physically abused: Not on file     Forced sexual activity: Not on file   Other Topics Concern    Not on file   Social History Narrative    Not on file       OBJECTIVE:    Vitals:   Vitals:    12/03/19 0825   BP: (!) 176/91   Pulse: 66   Weight: 173 lb (78.5 kg)   Height: 5' 7.01\" (1.702 m)     Physical Examination:     General:  alert, cooperative, no distress   Lungs: no accessory muscle use   Cardiac: Regular rate and rhythm   Abdomen: soft, bowel sounds active, non-tender  Bruising around trocar sites with tracking posteriorly. No erythema. No CVA tenderness   Incision: healing well   Pelvic: Defered   Rectal: Deferred   Extremity:   extremities normal, atraumatic, no cyanosis or edema     IMPRESSION:    Milena Avitia is doing as expected postoperatively. She has a diagnosis of stage IA, grade 3, endometrial carcinoma. Her sentinel nodes were negative and she has no LVSI. Pelvic fluid cytology is still pending. She has some bruising around her incision sites, but no evidence of infection.     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 Implications of diagnosis discussed at length. All questions answered. I will see the patient back in 3 weeks for her postop check. The patient is advised to call our office with any problems or concerns.     Brianna Fay MD  12/3/2019/10:25 AM

## 2019-12-18 ENCOUNTER — OFFICE VISIT (OUTPATIENT)
Dept: GYNECOLOGY | Age: 69
End: 2019-12-18

## 2019-12-18 ENCOUNTER — TELEPHONE (OUTPATIENT)
Dept: GYNECOLOGY | Age: 69
End: 2019-12-18

## 2019-12-18 VITALS
WEIGHT: 166.2 LBS | HEART RATE: 65 BPM | DIASTOLIC BLOOD PRESSURE: 88 MMHG | SYSTOLIC BLOOD PRESSURE: 149 MMHG | HEIGHT: 67 IN | BODY MASS INDEX: 26.09 KG/M2

## 2019-12-18 DIAGNOSIS — C54.1 ENDOMETRIAL CANCER (HCC): Primary | ICD-10-CM

## 2019-12-18 NOTE — PROGRESS NOTES
[address] [facility] [address], [address]  P [phone]  F [phone]    Postoperative Office Note  Patient ID:  Name: Mitchel Rivera  MRM: [id number]  : 1950/69 y.o. Date: 2019    Diagnosis:     ICD-10-CM ICD-9-CM    1. 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 female who presents for followup postoperative care following robotic-assisted TLH, BSO, SP&PALND. The patient's pathology revealed: FINAL PATHOLOGIC DIAGNOSIS   1. Lymph node, left pelvic, sentinel node excision:   No evidence for malignancy in one node (0/1). See comment. 2. Lymph node, right pelvic, sentinel node excision:   No evidence for malignancy in one node (0/1). See comment. 3. Lymph node, right periaortic, sentinel node excision:   No evidence for malignancy in one node (0/1). See comment. 4. Uterus, cervix, fallopian tubes, ovaries, hysterectomy, salpingo-oophorectomy:   Cervix: No evidence for dysplasia or malignancy. Endometrium: Endometrioid adenocarcinoma, FIGO grade 3. See synoptic report. Myometrium: Invasive endometrioid adenocarcinoma. Leiomyoma. Fallopian tubes: Fibrous atrophy. Ovaries: Endosalpingosis. Endometrium uterus endometrium - ENDOMETRIUM   SPECIMEN   Procedure:  Total hysterectomy and bilateral salpingo-oophorectomy   TUMOR   Histologic Type: Endometrioid carcinoma, NOS   Histologic Grade: FIGO grade 3   Myometrial Invasion: Present   Depth of Invasion (Millimeters): 6 mm   Myometrial Thickness (Millimeters): 16 mm   Percentage of Myometrial Invasion: <50 %   Uterine Serosa Involvement: Not identified   Cervical Stromal Involvement: Not identified   Other Tissue / Organ Involvement: Not identified   Lymphovascular Invasion: Not identified   MARGINS - Uninvolved by tumor   LYMPH NODES   Lymph Node Status: All lymph nodes negative for tumor cells   Total Number of Pelvic Nodes Examined: 2   Number of Pelvic [place] Nodes Examined: 2   Total Number of Para-aortic Nodes Examined: 1   Number of Para-aortic [place] Nodes Examined: 1   Pathologic Stage   Primary Tumor (pT): pT1a   Regional Lymph Nodes (pN): pN0   Comment   Immunohistochemistry stains with antibodies directed against pan keratin are [negative] in the sentinel lymph nodes from parts #1 through #3. She called me multiple times over the Thanksgiving weekend with concerns about her incisions. She presented the following week for evaluation. She had some bruising around her incisions, but nothing worrisome. She presents again today concerned about some discoloration at her midline incision site. Medications:     Current Outpatient Medications on File Prior to Visit   Medication Sig Dispense Refill    naproxen sodium (ALEVE) 220 mg cap Take  by mouth.  SF 5000 PLUS 1.1 % crea TOOTHPASTE  4    Minoxidil (ROGAINE) 5 % foam by Apply Externally route.  timolol (TIMOPTIC-XE) 0.25 % ophthalmic gel-forming Administer 1 Drop to both eyes daily. 0    Omega-3 Fatty Acids 300 mg cap Take 3 Tabs by mouth daily.  multivitamin (ONE A DAY) tablet Take 1 Tab by mouth daily. OR AREDS      cholecalciferol (VITAMIN D3) 1,000 unit tablet Take 1,000 Units by mouth daily.       cephALEXin (KEFLEX) 500 mg capsule TK ONE C PO QID  0    acetaminophen (TYLENOL) 325 mg tablet Take  by mouth every four (4) hours as needed for Pain. No current facility-administered medications on file prior to visit. Allergies:      Allergies   Allergen Reactions    Aspirin Other (comments)     heartburn     Past Medical History:   Diagnosis Date    BCC (basal cell carcinoma)     Cancer (HCC) 10/2019    UTERINE    Degenerative joint disease (DJD) of lumbar spine     GERD (gastroesophageal reflux disease)     Glaucoma     Headache     Nausea & vomiting     PUD (peptic ulcer disease)     \"MANY YEARS AGO\"    Rheumatic fever     AS CHILD    Sinus bradycardia by electrocardiogram 2019     Past Surgical History:   Procedure Laterality Date    HX COLPOSCOPY      abn pap    HX HEENT      SHAMA CATARACTS    HX HIP REPLACEMENT      x2    HX HYSTERECTOMY  2019    Dr. Mejía Mo HX ORTHOPAEDIC      SHAMA HIP REPLACEMENT    HX ORTHOPAEDIC      LEFT ELBOW FOR FX X2    HX OTHER SURGICAL      DOG BIT SURGERY ON RIGHT HAND    HX WISDOM TEETH EXTRACTION       Social History     Socioeconomic History    Marital status:      Spouse name: Not on file    Number of children: Not on file    Years of education: Not on file    Highest education level: Not on file   Occupational History    Not on file   Social Needs    Financial resource strain: Not on file    Food insecurity:     Worry: Not on file     Inability: Not on file    Transportation needs:     Medical: Not on file     Non-medical: Not on file   Tobacco Use    Smoking status: Former Smoker     Packs/day: 0.50     Years: 20.00     Pack years: 10.00     Last attempt to quit: 10/6/1978     Years since quittin.2    Smokeless tobacco: Never Used    Tobacco comment: SOMOKE OFF AND ON   Substance and Sexual Activity    Alcohol use: Yes     Comment: RARELY    Drug use: Never    Sexual activity: Not Currently     Comment:   retired   Lifestyle    Physical activity:     Days per week: Not on file Minutes per session: Not on file    Stress: Not on file   Relationships    Social connections:     Talks on phone: Not on file     Gets together: Not on file     Attends Mosque service: Not on file     Active member of club or organization: Not on file     Attends meetings of clubs or organizations: Not on file     Relationship status: Not on file    Intimate partner violence:     Fear of current or ex partner: Not on file     Emotionally abused: Not on file     Physically abused: Not on file     Forced sexual activity: Not on file   Other Topics Concern    Not on file   Social History Narrative    Not on file       OBJECTIVE:    Vitals:   Vitals:    12/18/19 1357   BP: 149/88   Pulse: 65   Weight: 166 lb 3.2 oz (75.4 kg)   Height: 5' 7.01\" (1.702 m)     Physical Examination:     General:  alert, cooperative, no distress   Lungs: no accessory muscle use   Cardiac: Regular rate and rhythm   Abdomen: soft, bowel sounds active, non-tender  Previous bruising around trocar sites has resolved. No erythema. There was some discoloration of the glue at the midline site. The glue was removed from all of the incisions and they all appeared normal.  No CVA tenderness   Incision: healing well   Pelvic: Defered   Rectal: Deferred   Extremity:   extremities normal, atraumatic, no cyanosis or edema     IMPRESSION:    Unique Ferrari is doing as expected postoperatively. She has a diagnosis of stage IA, grade 3, endometrial carcinoma. Her sentinel nodes were negative and she has no LVSI. Pelvic fluid cytology is negative. She had some bruising around her incision sites, but that has resolved and there is no evidence of infection. There was some discoloration of the Dermabond on her incisions, but otherwise normal exam today.     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 Implications of diagnosis discussed at length. All questions answered. I will see the patient back in 2 weeks for her postop check. The patient is advised to call our office with any problems or concerns.     Bolivar Kent MD  12/18/2019/10:25 AM

## 2019-12-31 ENCOUNTER — OFFICE VISIT (OUTPATIENT)
Dept: GYNECOLOGY | Age: 69
End: 2019-12-31

## 2019-12-31 VITALS
DIASTOLIC BLOOD PRESSURE: 84 MMHG | HEIGHT: 67 IN | SYSTOLIC BLOOD PRESSURE: 152 MMHG | HEART RATE: 60 BPM | BODY MASS INDEX: 25.9 KG/M2 | WEIGHT: 165 LBS

## 2019-12-31 DIAGNOSIS — C54.1 ENDOMETRIAL CANCER (HCC): Primary | ICD-10-CM

## 2019-12-31 NOTE — PROGRESS NOTES
[address] [facility] [address] [facility]  P [phone]  F [phone]    Postoperative Office Note  Patient ID:  Name: Jeromy Coronado  MRM: [id number]  : 1950/69 y.o. Date: 2019    Diagnosis:     ICD-10-CM ICD-9-CM    1. 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 female who presents for followup postoperative care following robotic-assisted TLH, BSO, SP&PALND. The patient's pathology revealed: FINAL PATHOLOGIC DIAGNOSIS   1. Lymph node, left pelvic, sentinel node excision:   No evidence for malignancy in one node (0/1). See comment. 2. Lymph node, right pelvic, sentinel node excision:   No evidence for malignancy in one node (0/1). See comment. 3. Lymph node, right periaortic, sentinel node excision:   No evidence for malignancy in one node (0/1). See comment. 4. Uterus, cervix, fallopian tubes, ovaries, hysterectomy, salpingo-oophorectomy:   Cervix: No evidence for dysplasia or malignancy. Endometrium: Endometrioid adenocarcinoma, FIGO grade 3. See synoptic report. Myometrium: Invasive endometrioid adenocarcinoma. Leiomyoma. Fallopian tubes: Fibrous atrophy. Ovaries: Endosalpingosis. Endometrium uterus endometrium - ENDOMETRIUM   SPECIMEN   Procedure:  Total hysterectomy and bilateral salpingo-oophorectomy   TUMOR   Histologic Type: Endometrioid carcinoma, NOS   Histologic Grade: FIGO grade 3   Myometrial Invasion: Present   Depth of Invasion (Millimeters): 6 mm   Myometrial Thickness (Millimeters): 16 mm   Percentage of Myometrial Invasion: <50 %   Uterine Serosa Involvement: Not identified   Cervical Stromal Involvement: Not identified   Other Tissue / Organ Involvement: Not identified   Lymphovascular Invasion: Not identified   MARGINS - Uninvolved by tumor   LYMPH NODES   Lymph Node Status: All lymph nodes negative for tumor cells   Total Number of Pelvic Nodes Examined: 2   Number of Pelvic [place] Nodes Examined: 2   Total Number of Para-aortic Nodes Examined: 1   Number of Para-aortic [place] Nodes Examined: 1   Pathologic Stage   Primary Tumor (pT): pT1a   Regional Lymph Nodes (pN): pN0   Comment   Immunohistochemistry stains with antibodies directed against pan keratin are [negative] in the sentinel lymph nodes from parts #1 through #3. She called me multiple times over the Thanksgiving weekend with concerns about her incisions. She presented the following week for evaluation. She had some bruising around her incisions, but nothing worrisome. She presents again postoperatively, concerned about some discoloration at her midline incision site. She presents today without complaints. Medications:     Current Outpatient Medications on File Prior to Visit   Medication Sig Dispense Refill    naproxen sodium (ALEVE) 220 mg cap Take  by mouth.  SF 5000 PLUS 1.1 % crea TOOTHPASTE  4    Minoxidil (ROGAINE) 5 % foam by Apply Externally route.  timolol (TIMOPTIC-XE) 0.25 % ophthalmic gel-forming Administer 1 Drop to both eyes daily. 0    Omega-3 Fatty Acids 300 mg cap Take 3 Tabs by mouth daily.  multivitamin (ONE A DAY) tablet Take 1 Tab by mouth daily. OR AREDS      cholecalciferol (VITAMIN D3) 1,000 unit tablet Take 1,000 Units by mouth daily.       acetaminophen (TYLENOL) 325 mg tablet Take  by mouth every four (4) hours as needed for Pain. No current facility-administered medications on file prior to visit. Allergies:      Allergies   Allergen Reactions    Aspirin Other (comments)     heartburn     Past Medical History:   Diagnosis Date    BCC (basal cell carcinoma)     Cancer (HCC) 10/2019    UTERINE    Degenerative joint disease (DJD) of lumbar spine     GERD (gastroesophageal reflux disease)     Glaucoma     Headache     Nausea & vomiting     PUD (peptic ulcer disease)     \"MANY YEARS AGO\"    Rheumatic fever     AS CHILD    Sinus bradycardia by electrocardiogram 2019     Past Surgical History:   Procedure Laterality Date    HX COLPOSCOPY      abn pap    HX HEENT      SHAMA CATARACTS    HX HIP REPLACEMENT      x2    HX HYSTERECTOMY  2019    Dr. Mejía Mo HX ORTHOPAEDIC      SHAMA HIP REPLACEMENT    HX ORTHOPAEDIC      LEFT ELBOW FOR FX X2    HX OTHER SURGICAL      DOG BIT SURGERY ON RIGHT HAND    HX WISDOM TEETH EXTRACTION       Social History     Socioeconomic History    Marital status:      Spouse name: Not on file    Number of children: Not on file    Years of education: Not on file    Highest education level: Not on file   Occupational History    Not on file   Social Needs    Financial resource strain: Not on file    Food insecurity:     Worry: Not on file     Inability: Not on file    Transportation needs:     Medical: Not on file     Non-medical: Not on file   Tobacco Use    Smoking status: Former Smoker     Packs/day: 0.50     Years: 20.00     Pack years: 10.00     Last attempt to quit: 10/6/1978     Years since quittin.2    Smokeless tobacco: Never Used    Tobacco comment: SOMOKE OFF AND ON   Substance and Sexual Activity    Alcohol use: Yes     Comment: RARELY    Drug use: Never    Sexual activity: Not Currently     Comment:   retired   Lifestyle    Physical activity:     Days per week: Not on file     Minutes per session: Not on file    Stress: Not on file   Relationships    Social connections:     Talks on phone: Not on file     Gets together: Not on file     Attends Anabaptism service: Not on file     Active member of club or organization: Not on file     Attends meetings of clubs or organizations: Not on file     Relationship status: Not on file    Intimate partner violence:     Fear of current or ex partner: Not on file     Emotionally abused: Not on file     Physically abused: Not on file     Forced sexual activity: Not on file   Other Topics Concern    Not on file   Social History Narrative    Not on file       OBJECTIVE:    Vitals:   Vitals:    12/31/19 1106   BP: 152/84   Pulse: 60   Weight: 165 lb (74.8 kg)   Height: 5' 7.01\" (1.702 m)     Physical Examination:     General:  alert, cooperative, no distress   Lungs: no accessory muscle use   Cardiac: Regular rate and rhythm   Abdomen: soft, bowel sounds active, non-tender; incisions well healed   Incision: healing well   Pelvic: Normal external genitalia. Normal vagina. Cuff intact and well supported. No masses. Rectal: Deferred   Extremity:   extremities normal, atraumatic, no cyanosis or edema     IMPRESSION:    Prudence Honor is doing as expected postoperatively. She has a diagnosis of stage IA, grade 3, endometrial carcinoma. Her sentinel nodes were negative and she has no LVSI. Pelvic fluid cytology is negative.       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 Implications of diagnosis discussed at length. All questions answered. She does have some risk for recurrence, mainly due to the high-grade nature of her cancer, but I do not recommend any adjuvant therapy. I think the risks outweigh the benefits. I am reassured by the minimal invasion, negative nodes, negative LVSI, and negative washings. I discussed with her the rationale for and schedule of surveillance. I will see the patient back in 3 months to begin surveillance. The patient is advised to call our office with any problems or concerns. Due to her family history and personal history of endometrial cancer, we will proceed with genetic testing.       Gasper York MD  12/31/2019/10:25 AM

## 2020-01-13 ENCOUNTER — HOSPITAL ENCOUNTER (OUTPATIENT)
Dept: LAB | Age: 70
Discharge: HOME | End: 2020-01-13

## 2020-01-13 ENCOUNTER — OFFICE VISIT (OUTPATIENT)
Dept: INTERNAL MEDICINE CLINIC | Age: 70
End: 2020-01-13

## 2020-01-13 VITALS
TEMPERATURE: 98 F | BODY MASS INDEX: 26.06 KG/M2 | HEIGHT: 67 IN | WEIGHT: 166 LBS | RESPIRATION RATE: 20 BRPM | OXYGEN SATURATION: 98 % | HEART RATE: 61 BPM | SYSTOLIC BLOOD PRESSURE: 135 MMHG | DIASTOLIC BLOOD PRESSURE: 69 MMHG

## 2020-01-13 DIAGNOSIS — R53.83 FATIGUE, UNSPECIFIED TYPE: ICD-10-CM

## 2020-01-13 DIAGNOSIS — Z13.29 SCREENING FOR THYROID DISORDER: ICD-10-CM

## 2020-01-13 DIAGNOSIS — Z85.42 HISTORY OF ENDOMETRIAL CANCER: ICD-10-CM

## 2020-01-13 DIAGNOSIS — Z13.29 SCREENING FOR THYROID DISORDER: Primary | ICD-10-CM

## 2020-01-13 PROBLEM — Z96.1 PSEUDOPHAKIA OF BOTH EYES: Status: ACTIVE | Noted: 2018-05-29

## 2020-01-13 PROBLEM — Z01.818 ENCOUNTER FOR OTHER PREPROCEDURAL EXAMINATION: Status: ACTIVE | Noted: 2017-10-24

## 2020-01-13 LAB — TSH SERPL DL<=0.05 MIU/L-ACNC: 1.22 UIU/ML (ref 0.36–3.74)

## 2020-01-13 NOTE — PROGRESS NOTES
Patient states having a hysterectomy and breast biopsy and would like to know what is the next step. Discuss nutrition and exercise. Patient having a lot of headaches again, not migraines.

## 2020-01-13 NOTE — PROGRESS NOTES
Chief Complaint   Patient presents with    Hysterectomy    Breast Problem    Nutrition     she is a 68y.o. year old female who presents for evaluation. Patient presents the office today to follow-up on a number of concerns since her last appointment. She reports since her last appointment she was diagnosed with endometrial cancer. She reports Dr. Deep Horton is her provider. She will be seeing him back in March. She reports she may would like to have a second opinion. She reports her   She reports her sister went through a similar experience at a larger hospital and she had a team. The patient for instance would like to know if she should see a nutritionist or get a bone scan. She reports her strength is not back to normal.    Reviewed PmHx, RxHx, FmHx, SocHx, AllgHx and updated and dated in the chart. Review of Systems - negative except as listed above    Objective:     Vitals:    01/13/20 1044   BP: 135/69   Pulse: 61   Resp: 20   Temp: 98 °F (36.7 °C)   TempSrc: Oral   SpO2: 98%   Weight: 166 lb (75.3 kg)   Height: 5' 7.01\" (1.702 m)     Physical Examination: General appearance - alert, well appearing, and in no distress  Mental status - normal mood, behavior, speech, dress, motor activity, and thought processes    Assessment/ Plan:   Diagnoses and all orders for this visit:    1. Screening for thyroid disorder  -     TSH 3RD GENERATION; Future    2. History of endometrial cancer  -     REFERRAL TO NUTRITION       the patient and I spoke about a number of concerns today. I explained to her that it would be best that she reach out to her oncologist office with any questions pertaining to her treatment plan. I have given her a referral to see a nutritionist. The patient and I spoke about getting a second opinion at [facility]. She reports her care has been good but she is interested in a more team approach.  I will reach out to [facility] this week to see if she knows about whether or not [facility] have an oncologist nurse navigator. She will be contact with the results of her labs once back. I have discussed the diagnosis with the patient and the intended plan as seen in the above orders. The patient has received an after-visit summary and questions were answered concerning future plans.      Medication Side Effects and Warnings were discussed with patient: n/a  Patient Labs were reviewed and or requested: yes    Patient Past Records were reviewed and or requested  yes    Romi Marrero PA-C

## 2020-01-13 NOTE — PROGRESS NOTES
Please let the patient know her thyroid testing is normal. Let her know I will speak with Jos eManuel Martinez RN once she is back.  thanks

## 2020-01-14 ENCOUNTER — TELEPHONE (OUTPATIENT)
Dept: INTERNAL MEDICINE CLINIC | Age: 70
End: 2020-01-14

## 2020-01-14 NOTE — TELEPHONE ENCOUNTER
Please contact the patient and let her know I had opportunity to with [facility] today. She has suggested that she contact her oncologist office to see if they have nurse navigators that work within the oncology department. She reports that she believes this was so at one time but she is not sure if this is still the case.   Thank you

## 2020-01-14 NOTE — TELEPHONE ENCOUNTER
Writer spoke with patient and gave instruction to contact oncologist office for [facility] that works in oncology department, patient verbalized understanding.

## 2020-01-14 NOTE — PROGRESS NOTES
Writer contacted patient to inform of thyroid results per Delaney Huang, patient verbalized understanding.

## 2020-01-29 ENCOUNTER — HOSPITAL ENCOUNTER (OUTPATIENT)
Dept: NUTRITION | Age: 70
Discharge: HOME | End: 2020-01-29
Payer: MEDICARE

## 2020-01-29 PROCEDURE — 97802 MEDICAL NUTRITION INDIV IN: CPT | Performed by: DIETITIAN, REGISTERED

## 2020-01-29 NOTE — PROGRESS NOTES
[address] [facility]     Nutrition Assessment - Medical Nutrition Therapy   Outpatient Initial Evaluation         Patient Name: Erich Bean : 1950   Treatment Diagnosis:    Referral Source: Pritesh Garcia [facility] of Care [facility]): 2020     Gender: female Age: 68 y.o. Ht: 67 in Wt: 166.5  lb  kg   BMI:  BMR   Male  BMR Female      Past Medical History:  Past Medical History:   Diagnosis Date    BCC (basal cell carcinoma)     Cancer ([facility] [address] 10/2019    UTERINE    Degenerative joint disease (DJD) of lumbar spine     GERD (gastroesophageal reflux disease)     Glaucoma     Headache     Nausea & vomiting     PUD (peptic ulcer disease)     \"MANY YEARS AGO\"    Rheumatic fever     AS CHILD    Sinus bradycardia by electrocardiogram 2019          Pertinent Medications:     Current Outpatient Medications:     psyllium husk (METAMUCIL PO), Take 1 Tab by mouth nightly., Disp: , Rfl:     vit A/vit C/vit E/zinc/copper (PRESERVISION AREDS PO), Take 1 Tab by mouth every fourty-eight (48) hours. , Disp: , Rfl:     naproxen sodium (ALEVE) 220 mg cap, Take  by mouth daily as needed. , Disp: , Rfl:     SF 5000 PLUS 1.1 % crea, TOOTHPASTE, Disp: , Rfl: 4    Minoxidil (ROGAINE) 5 % foam, by Apply Externally route., Disp: , Rfl:     timolol (TIMOPTIC-XE) 0.25 % ophthalmic gel-forming, Administer 1 Drop to both eyes daily. , Disp: , Rfl: 0    Omega-3 Fatty Acids 300 mg cap, Take 3 Tabs by mouth daily. , Disp: , Rfl:     multivitamin (ONE A DAY) tablet, Take 1 Tab by mouth daily. OR AREDS, Disp: , Rfl:     cholecalciferol (VITAMIN D3) 1,000 unit tablet, Take 1,000 Units by mouth daily. , Disp: , Rfl:   DHA - 450mg daily for dry eye     Biochemical Data:   No results found for: HBA1C, HGBE8, FBP9LFHV, SKJ0NDWX  Lab Results   Component Value Date/Time    Sodium 134 (L) 2019 04:16 AM    Potassium 3.8 2019 04:16 AM    Chloride 102 2019 04:16 AM    CO2 27 11/28/2019 04:16 AM    Anion gap 5 11/28/2019 04:16 AM    Glucose 154 (H) 11/28/2019 04:16 AM    BUN 14 11/28/2019 04:16 AM    Creatinine 1.05 (H) 11/28/2019 04:16 AM    BUN/Creatinine ratio 13 11/28/2019 04:16 AM    GFR est AA >60 11/28/2019 04:16 AM    GFR est non-AA 52 (L) 11/28/2019 04:16 AM    Calcium 8.4 (L) 11/28/2019 04:16 AM    Bilirubin, total 0.7 11/20/2019 11:59 AM    AST (SGOT) 16 11/20/2019 11:59 AM    Alk. phosphatase 76 11/20/2019 11:59 AM    Protein, total 7.2 11/20/2019 11:59 AM    Albumin 3.8 11/20/2019 11:59 AM    Globulin 3.4 11/20/2019 11:59 AM    A-G Ratio 1.1 11/20/2019 11:59 AM    ALT (SGPT) 27 11/20/2019 11:59 AM     Lab Results   Component Value Date/Time    Cholesterol, total 187 08/25/2017 09:53 AM    HDL Cholesterol 53 08/25/2017 09:53 AM    LDL, calculated 110 (H) 08/25/2017 09:53 AM    VLDL, calculated 24 08/25/2017 09:53 AM    Triglyceride 121 08/25/2017 09:53 AM     Lab Results   Component Value Date/Time    ALT (SGPT) 27 11/20/2019 11:59 AM    AST (SGOT) 16 11/20/2019 11:59 AM    Alk. phosphatase 76 11/20/2019 11:59 AM    Bilirubin, total 0.7 11/20/2019 11:59 AM     Lab Results   Component Value Date/Time    Creatinine 1.05 (H) 11/28/2019 04:16 AM     Lab Results   Component Value Date/Time    BUN 14 11/28/2019 04:16 AM     No results found for: MCACR, MCA1, MCA2, MCA3, MCAU, MCAU2, MCALPOCT     Assessment:    pt is a 77yo female here today for guidance about healthy diet prior to treatment for cancer. She states her sister had the same type of cancer, caught much later, and had a full team. She is not sure yet what type of treatment she will be having if any. States he was told not to consume soy. She had stopped all soy food, foods with soy in ingredients list, and supplements with soy in them. Notes this was very restrictive and has been more relaxed about this, but trying to avoid   Pt notes piror to diagnosis she was hoping to loose weight of about 10#.    No current activity level but wants to star yoga or water therapy. Food & Nutrition: Made change to diet since diagnosis to stop soy, now adding minimally back in. Enjoys most foods. Not a fan of cooking. Enjoys ease and convenience. Notes needing to drink more water. Estimate Needs   Calories:   Protein:  Carbs: Fat:    Kcal/day  g/day  g/day  g/day        percent: 25  45  30               Nutrition Diagnosis Food and nutrition related knowledge deficit R/T lack of prior education for  Cancer and nutrition considerations AEB pt questions today     Nutrition Intervention &  Education: Educated pt on balanced plate, roles of carbs, fat,and protein in healthy diet prior to treatment. Reviewed [employer] Nutrition handbook. Educated on various treatment symptom management options based on possible treatments. On hold for weight loss goals till discussed with future treatment team.worked with pt to build balanced plate aiming for 0/3-9/2 plate vegetables of various colors, 1/4 protein, and 1/4-1/3 complex carbs. Set goals for exercise. Provided information on soy and endometrial cancer risk. Will follow recommendations of cancer team in this regard.     Handouts Provided: [x]  Carbohydrates  [x]  Protein  [x]  Non-starchy Vegatbles  []  Food Label  [x]  Meal and Snack Ideas  []  Food Journals []  Diabetes  []  Cholesterol  []  Sodium  [x]  Gen Nutr Guidelines  []  SBGM Guidelines  []  Others:   Information Reviewed with: pt   Readiness to Change Stage: []  Pre-contemplative    []  Contemplative  [x]  Preparation               []  Action                  []  Maintenance   Potential Barriers to Learning: []  Decline in memory    []  Language barrier   []  Other:  [x]  Emotional                  []  Limited mobility  []  Lack of motivation     [] Vision, hearing or cognitive impairment   Expected Compliance: Good /      Nutritional Goal - To promote lifestyle changes to result in:    []  Weight loss  []  Improved diabetic control  []  Decreased cholesterol levels  []  Decreased blood pressure  []  Weight maintenance []  Preventing any interactions associated with food allergies  []  Adequate weight gain toward goal weight  [x]  Other: preparation and improved strength for cancer treatment     Patient Goals:   -use balanced plate for planning 3 meals per day with optional 2 snacks    - Improve physical activity w/goal to do yoga on you tube or water aerobics for 30 minutes 2 times per week.     -use resources provided for more information on cancer treatment and symptom management as needed   Total Time Spent: 60min    Dietitian Signature: Alessandra Benton MS, RD, CSSD Date: 1/29/2020   Follow-up: PRN Time: 5:25 PM

## 2020-01-31 ENCOUNTER — TELEPHONE (OUTPATIENT)
Dept: GYNECOLOGY | Age: 70
End: 2020-01-31

## 2020-02-04 ENCOUNTER — TELEPHONE (OUTPATIENT)
Dept: GYNECOLOGY | Age: 70
End: 2020-02-04

## 2020-02-12 PROBLEM — Z01.818 ENCOUNTER FOR OTHER PREPROCEDURAL EXAMINATION: Status: RESOLVED | Noted: 2017-10-24 | Resolved: 2020-02-12

## 2020-02-18 ENCOUNTER — TELEPHONE (OUTPATIENT)
Dept: GYNECOLOGY | Age: 70
End: 2020-02-18

## 2020-02-18 NOTE — TELEPHONE ENCOUNTER
Patient spoke with radiologist Dr. Nida Cyr at [facility], and she wants to run some things by you and get your opinion.

## 2020-02-18 NOTE — TELEPHONE ENCOUNTER
Patient wanted to let you know she found out recently a first cousin  of colorectal cancer. She wasn't sure if you needed that info for genetic testing.

## 2020-06-22 NOTE — PROGRESS NOTES
Three month check up for history of endometrial cancer. Pt completed brachytherapy in March. Patient c/o spotting x1 month. She is using the dilator weekly.

## 2020-06-23 ENCOUNTER — OFFICE VISIT (OUTPATIENT)
Dept: GYNECOLOGY | Age: 70
End: 2020-06-23

## 2020-06-23 VITALS
BODY MASS INDEX: 26.68 KG/M2 | DIASTOLIC BLOOD PRESSURE: 72 MMHG | TEMPERATURE: 98.5 F | HEART RATE: 62 BPM | HEIGHT: 67 IN | WEIGHT: 170 LBS | SYSTOLIC BLOOD PRESSURE: 149 MMHG

## 2020-06-23 DIAGNOSIS — C54.1 ENDOMETRIAL CANCER (HCC): Primary | ICD-10-CM

## 2020-06-23 RX ORDER — CALCIUM CARBONATE 200(500)MG
1 TABLET,CHEWABLE ORAL DAILY
COMMUNITY

## 2020-06-23 NOTE — PROGRESS NOTES
[address] [facility] [address], [address] [facility]  P [phone]  F [phone]    Office Note  Patient ID:  Name:  Areli Craig  MRN:  [id number]  :  1950/70 y.o. Date:  2020      HISTORY OF PRESENT ILLNESS:  Areli Craig is a 65 y.o.  postmenopausal female with a diagnosis of stage IA, grade 3 endometrial cancer. She underwent a robotic assisted TLH, BSO, SPLND in 2019. She had minimal invasion, no LVSI, and negative washings. I did not recommend any adjuvant therapy, though I did discuss her risks of recurrence. She underwent genetic testing and had a VUS of the SMAD4 gene, while negative for any other mutations. She did have a second opinion at [facility] with Dr. Corky Boone, and ultimately underwent vaginal cuff brachytherapy there, which she completed in 2020. She presents today for follow-up. I have not seen her back in the office since her postoperative visit in 2019. She reports some recent spotting, which she attributes to the use of the vaginal dilator. She reports some bladder irritation/symptoms, but no GI issues at this time. She denies pelvic pain. ROS:   and GI review:  Negative  Cardiopulmonary review:  Negative   Musculoskeletal:  Negative    A comprehensive review of systems was negative except for that written in the History of Present Illness. , 10 point ROS      Problem List:  Patient Active Problem List    Diagnosis Date Noted    Endometrial cancer ([facility] [address] 2019    Sinus bradycardia by electrocardiogram 2019    Pseudophakia of both eyes 2018    White coat syndrome without diagnosis of hypertension 2017    Living will, counseling/discussion 2017    Primary open angle glaucoma (POAG) 2016    Nuclear nonsenile cataract 2016    Vaccination refused by patient 10/06/2016    Colonoscopy refused 10/06/2016    Primary insomnia 10/06/2016    Glaucoma     Degenerative joint disease (DJD) of lumbar spine     GERD (gastroesophageal reflux disease)     Rheumatic fever     PUD (peptic ulcer disease)      PMH:  Past Medical History:   Diagnosis Date    BCC (basal cell carcinoma)     Cancer ([facility] [address] 10/2019    UTERINE    Degenerative joint disease (DJD) of lumbar spine     GERD (gastroesophageal reflux disease)     Glaucoma     Headache     Nausea & vomiting     PUD (peptic ulcer disease)     \"MANY YEARS AGO\"    Rheumatic fever     AS CHILD    Sinus bradycardia by electrocardiogram 2019      PSH:  Past Surgical History:   Procedure Laterality Date    HX COLPOSCOPY      abn pap    HX HEENT      SHAMA CATARACTS    HX HIP REPLACEMENT      x2    HX HYSTERECTOMY  2019    Dr. Kailey Le HX ORTHOPAEDIC      LEFT ELBOW FOR FX X2    HX OTHER SURGICAL      DOG BIT SURGERY ON RIGHT HAND    HX WISDOM TEETH EXTRACTION        Social History:  Social History     Tobacco Use    Smoking status: Former Smoker     Packs/day: 0.50     Years: 20.00     Pack years: 10.00     Last attempt to quit: 10/6/1978     Years since quittin.7    Smokeless tobacco: Never Used    Tobacco comment: SOMOKE OFF AND ON   Substance Use Topics    Alcohol use: Yes     Comment: RARELY      Family History:  Family History   Problem Relation Age of Onset    Cancer Mother         lung    Arthritis-osteo Mother     Heart Disease Father         pacer    Arthritis-osteo Father     Cancer Father         SKIN - basal cell    Pacemaker Father     Heart Surgery Father         STENT    Colon Polyps Father         no firm diagnosis of any cancer    Diabetes Sister     Other Sister         \"SLIGHT MENTAL RETARDATION\" - aphasic    Colon Polyps Sister         hx of polyps, uncertain of cancer    Other Sister         \"GUILLIAN BARRE\"    Breast Cancer Sister 76    Cancer Sister 58        endometrial    No Known Problems Daughter         born imperforate anus    Anesth Problems Neg Hx       Medications: (reviewed)  Current Outpatient Medications   Medication Sig    TURMERIC PO Take 500 mg by mouth.  calcium carbonate (TUMS) 200 mg calcium (500 mg) chew Take 1 Tab by mouth daily.  psyllium husk (METAMUCIL PO) Take 1 Tab by mouth nightly.  vit A/vit C/vit E/zinc/copper (PRESERVISION AREDS PO) Take 1 Tab by mouth every fourty-eight (48) hours.  naproxen sodium (ALEVE) 220 mg cap Take  by mouth daily as needed.  SF 5000 PLUS 1.1 % crea TOOTHPASTE    Minoxidil (ROGAINE) 5 % foam by Apply Externally route.  timolol (TIMOPTIC-XE) 0.25 % ophthalmic gel-forming Administer 1 Drop to both eyes daily.  Omega-3 Fatty Acids 300 mg cap Take 3 Tabs by mouth daily.  multivitamin (ONE A DAY) tablet Take 1 Tab by mouth daily. OR AREDS    cholecalciferol (VITAMIN D3) 1,000 unit tablet Take 1,000 Units by mouth daily. No current facility-administered medications for this visit. Allergies: (reviewed)  Allergies   Allergen Reactions    Shellfish Containing Products Diarrhea    Aspirin Other (comments)     heartburn          OBJECTIVE:    Physical Exam:  VITAL SIGNS: Vitals:    06/23/20 1118   BP: 149/72   Pulse: 62   Temp: 98.5 °F (36.9 °C)   Weight: 170 lb (77.1 kg)   Height: 5' 7.01\" (1.702 m)     Body mass index is 26.62 kg/m². GENERAL SHAMEKA: Conversant, alert, oriented. No acute distress. HEENT: HEENT. No thyroid enlargement. No JVD. Neck: Supple without restrictions. RESPIRATORY: Clear to auscultation and percussion to the bases. No CVAT. CARDIOVASC: RRR without murmur/rub. GASTROINT: soft, non-tender, without masses or organomegaly   MUSCULOSKEL: no joint tenderness, deformity or swelling   EXTREMITIES: extremities normal, atraumatic, no cyanosis or edema   PELVIC: Normal external genitalia. Normal vagina. No bleeding noted. Vaginal cuff intact and well supported.   No masses or nodularity on exam. RECTAL: Deferred   EDEL SURVEY: No suspicious lymphadenopathy or edema noted. NEURO: Grossly intact. No acute deficit. Lab Data:    Lab Results   Component Value Date/Time    WBC 12.7 (H) 11/28/2019 04:16 AM    HGB 11.3 (L) 11/28/2019 04:16 AM    HCT 34.7 (L) 11/28/2019 04:16 AM    PLATELET 571 59/48/9299 04:16 AM    MCV 94.3 11/28/2019 04:16 AM     Lab Results   Component Value Date/Time    Sodium 134 (L) 11/28/2019 04:16 AM    Potassium 3.8 11/28/2019 04:16 AM    Chloride 102 11/28/2019 04:16 AM    CO2 27 11/28/2019 04:16 AM    Anion gap 5 11/28/2019 04:16 AM    Glucose 154 (H) 11/28/2019 04:16 AM    BUN 14 11/28/2019 04:16 AM    Creatinine 1.05 (H) 11/28/2019 04:16 AM    BUN/Creatinine ratio 13 11/28/2019 04:16 AM    GFR est AA >60 11/28/2019 04:16 AM    GFR est non-AA 52 (L) 11/28/2019 04:16 AM    Calcium 8.4 (L) 11/28/2019 04:16 AM         Imaging:  None      IMPRESSION/PLAN:  Chapincito Coleman is a 65 y.o. female with a diagnosis of stage IA, grade 3, endometrial carcinoma. Following her RATLH, BSO, SPLND, she received vaginal brachytherapy at [address] [place] [address]. She has no evidence of disease at this time. I again discussed the rationale for and schedule of disease surveillance. I also explained the importance of using the vaginal dilator following her radiation therapy. She states she also has a follow-up scheduled with Dr. Prasad Henry at [address] [place] [address] in the next few weeks.         Signed By: Priyanka Gutierrez MD     6/23/2020/8:44 AM

## 2020-09-23 NOTE — PROGRESS NOTES
Three month check up for history of endometrial cancer. Pt states no abnormal spotting, bleeding or pain. Patient has seen Amado Fatima for incontinence. She is also seeing Dr. Aj Yin and will be starting a trial with dilation. 1. Have you been to the ER, urgent care clinic since your last visit? Hospitalized since your last visit? No    2. Have you seen or consulted any other health care providers outside of the [address] [place] [address] since your last visit? Include any pap smears or colon screening.  No

## 2020-09-24 ENCOUNTER — HOSPITAL ENCOUNTER (OUTPATIENT)
Dept: LAB | Age: 70
Discharge: HOME | End: 2020-09-24

## 2020-09-24 ENCOUNTER — OFFICE VISIT (OUTPATIENT)
Dept: GYNECOLOGY | Age: 70
End: 2020-09-24
Payer: MEDICARE

## 2020-09-24 VITALS
SYSTOLIC BLOOD PRESSURE: 158 MMHG | BODY MASS INDEX: 26.24 KG/M2 | DIASTOLIC BLOOD PRESSURE: 89 MMHG | WEIGHT: 167.2 LBS | HEIGHT: 67 IN | HEART RATE: 58 BPM

## 2020-09-24 DIAGNOSIS — C54.1 ENDOMETRIAL CANCER (HCC): Primary | ICD-10-CM

## 2020-09-24 PROCEDURE — G8399 PT W/DXA RESULTS DOCUMENT: HCPCS | Performed by: OBSTETRICS & GYNECOLOGY

## 2020-09-24 PROCEDURE — 3017F COLORECTAL CA SCREEN DOC REV: CPT | Performed by: OBSTETRICS & GYNECOLOGY

## 2020-09-24 PROCEDURE — G0463 HOSPITAL OUTPT CLINIC VISIT: HCPCS | Performed by: OBSTETRICS & GYNECOLOGY

## 2020-09-24 PROCEDURE — G8432 DEP SCR NOT DOC, RNG: HCPCS | Performed by: OBSTETRICS & GYNECOLOGY

## 2020-09-24 PROCEDURE — 1101F PT FALLS ASSESS-DOCD LE1/YR: CPT | Performed by: OBSTETRICS & GYNECOLOGY

## 2020-09-24 PROCEDURE — G8536 NO DOC ELDER MAL SCRN: HCPCS | Performed by: OBSTETRICS & GYNECOLOGY

## 2020-09-24 PROCEDURE — G8427 DOCREV CUR MEDS BY ELIG CLIN: HCPCS | Performed by: OBSTETRICS & GYNECOLOGY

## 2020-09-24 PROCEDURE — 99214 OFFICE O/P EST MOD 30 MIN: CPT | Performed by: OBSTETRICS & GYNECOLOGY

## 2020-09-24 PROCEDURE — G8417 CALC BMI ABV UP PARAM F/U: HCPCS | Performed by: OBSTETRICS & GYNECOLOGY

## 2020-09-24 PROCEDURE — 1090F PRES/ABSN URINE INCON ASSESS: CPT | Performed by: OBSTETRICS & GYNECOLOGY

## 2020-09-24 PROCEDURE — G9899 SCRN MAM PERF RSLTS DOC: HCPCS | Performed by: OBSTETRICS & GYNECOLOGY

## 2020-09-24 RX ORDER — LATANOPROST 50 UG/ML
1 SOLUTION/ DROPS OPHTHALMIC
COMMUNITY

## 2020-09-24 NOTE — PROGRESS NOTES
[address] [facility] [address], [address] [phone]  F [phone]    Office Note  Patient ID:  Name:  Georgie Bone  MRN:  [id number]  :  1950/70 y.o. Date:  2020      HISTORY OF PRESENT ILLNESS:  Georgie Bone is a 65 y.o.  postmenopausal female with a diagnosis of stage IA, grade 3 endometrial cancer. She underwent a robotic assisted TLH, BSO, SPLND in 2019. She had minimal invasion, no LVSI, and negative washings. I did not recommend any adjuvant therapy, though I did discuss her risks of recurrence. She underwent genetic testing and had a VUS of the SMAD4 gene, while negative for any other mutations. She did have a second opinion at [facility] with Dr. Andrea Shafer, and ultimately underwent vaginal cuff brachytherapy there, which she completed in 2020. She presents today for follow-up. She states that she is enrolled in a clinical trial at [facility] on the effects of the use of vaginal dilators. She was last seen there a few months ago. She is without complaints today. She denies any vaginal bleeding outside of when she uses the dilator, but that is not every time. She denies pelvic or abdominal pain. ROS:   and GI review:  Negative  Cardiopulmonary review:  Negative   Musculoskeletal:  Negative    A comprehensive review of systems was negative except for that written in the History of Present Illness. , 10 point ROS      Problem List:  Patient Active Problem List    Diagnosis Date Noted    Endometrial cancer ([facility] [address] 2019    Sinus bradycardia by electrocardiogram 2019    Pseudophakia of both eyes 2018    White coat syndrome without diagnosis of hypertension 2017    Living will, counseling/discussion 2017    Primary open angle glaucoma (POAG) 2016    Nuclear nonsenile cataract 2016    Vaccination refused by patient 10/06/2016    Colonoscopy refused 10/06/2016    Primary insomnia 10/06/2016    Glaucoma     Degenerative joint disease (DJD) of lumbar spine     GERD (gastroesophageal reflux disease)     Rheumatic fever     PUD (peptic ulcer disease)      PMH:  Past Medical History:   Diagnosis Date    BCC (basal cell carcinoma)     Cancer ([facility] [address] 10/2019    UTERINE    Degenerative joint disease (DJD) of lumbar spine     GERD (gastroesophageal reflux disease)     Glaucoma     Headache     Nausea & vomiting     PUD (peptic ulcer disease)     \"MANY YEARS AGO\"    Rheumatic fever     AS CHILD    Sinus bradycardia by electrocardiogram 2019      PSH:  Past Surgical History:   Procedure Laterality Date    HX COLPOSCOPY      abn pap    HX HEENT      SHAMA CATARACTS    HX HIP REPLACEMENT      x2    HX HYSTERECTOMY  2019    Dr. Marko Lopez    HX ORTHOPAEDIC      LEFT ELBOW FOR FX X2    HX OTHER SURGICAL      DOG BIT SURGERY ON RIGHT HAND    HX WISDOM TEETH EXTRACTION        Social History:  Social History     Tobacco Use    Smoking status: Former Smoker     Packs/day: 0.50     Years: 20.00     Pack years: 10.00     Last attempt to quit: 10/6/1978     Years since quittin.9    Smokeless tobacco: Never Used    Tobacco comment: SOMOKE OFF AND ON   Substance Use Topics    Alcohol use: Yes     Comment: RARELY      Family History:  Family History   Problem Relation Age of Onset    Cancer Mother         lung    Arthritis-osteo Mother     Heart Disease Father         pacer    Arthritis-osteo Father     Cancer Father         SKIN - basal cell    Pacemaker Father     Heart Surgery Father         STENT    Colon Polyps Father         no firm diagnosis of any cancer    Diabetes Sister     Other Sister         \"SLIGHT MENTAL RETARDATION\" - aphasic    Colon Polyps Sister         hx of polyps, uncertain of cancer    Other Sister         \"GUILLIAN BARRE\"    Breast Cancer Sister 76    Cancer Sister 58        endometrial    No Known Problems Daughter         born imperforate anus    Anesth Problems Neg Hx       Medications: (reviewed)  Current Outpatient Medications   Medication Sig    latanoprost (XALATAN) 0.005 % ophthalmic solution Administer 1 Drop to both eyes nightly.  calcium carbonate (TUMS) 200 mg calcium (500 mg) chew Take 1 Tab by mouth daily.  psyllium husk (METAMUCIL PO) Take 1 Tab by mouth nightly.  vit A/vit C/vit E/zinc/copper (PRESERVISION AREDS PO) Take 1 Tab by mouth every fourty-eight (48) hours.  naproxen sodium (ALEVE) 220 mg cap Take  by mouth daily as needed.  SF 5000 PLUS 1.1 % crea TOOTHPASTE    timolol (TIMOPTIC-XE) 0.25 % ophthalmic gel-forming Administer 1 Drop to both eyes daily.  Omega-3 Fatty Acids 300 mg cap Take 3 Tabs by mouth daily.  multivitamin (ONE A DAY) tablet Take 1 Tab by mouth daily. OR AREDS    cholecalciferol (VITAMIN D3) 1,000 unit tablet Take 1,000 Units by mouth daily.  TURMERIC PO Take 500 mg by mouth.  Minoxidil (ROGAINE) 5 % foam by Apply Externally route. No current facility-administered medications for this visit. Allergies: (reviewed)  Allergies   Allergen Reactions    Shellfish Containing Products Diarrhea    Aspirin Other (comments)     heartburn    Penicillins Rash     Had PCN many years ago and had a rash. Has had it once since then with no reaction. OBJECTIVE:    Physical Exam:  VITAL SIGNS: Vitals:    09/24/20 1104   BP: (!) 158/89   Pulse: (!) 58   Weight: 167 lb 3.2 oz (75.8 kg)   Height: 5' 7.01\" (1.702 m)     Body mass index is 26.18 kg/m². GENERAL SHAMEKA: Conversant, alert, oriented. No acute distress. HEENT: HEENT. No thyroid enlargement. No JVD. Neck: Supple without restrictions. RESPIRATORY: Clear to auscultation and percussion to the bases. No CVAT. CARDIOVASC: RRR without murmur/rub.    GASTROINT: soft, non-tender, without masses or organomegaly   MUSCULOSKEL: no joint tenderness, deformity or swelling   EXTREMITIES: extremities normal, atraumatic, no cyanosis or edema   PELVIC: Normal external genitalia. Normal distal vagina. There is a small friable and nodular area on the left anterolateral vagina measuring about 1.5 cm. I performed a biopsy of this suspicious lesion. The mass was almost removed completely with the biopsy. Hemostasis was achieved with silver nitrate. Vaginal cuff intact and well supported. No masses or nodularity on exam.     RECTAL: Deferred   EDEL SURVEY: No suspicious lymphadenopathy or edema noted. NEURO: Grossly intact. No acute deficit. Lab Data:    Lab Results   Component Value Date/Time    WBC 12.7 (H) 11/28/2019 04:16 AM    HGB 11.3 (L) 11/28/2019 04:16 AM    HCT 34.7 (L) 11/28/2019 04:16 AM    PLATELET 745 71/59/6097 04:16 AM    MCV 94.3 11/28/2019 04:16 AM     Lab Results   Component Value Date/Time    Sodium 134 (L) 11/28/2019 04:16 AM    Potassium 3.8 11/28/2019 04:16 AM    Chloride 102 11/28/2019 04:16 AM    CO2 27 11/28/2019 04:16 AM    Anion gap 5 11/28/2019 04:16 AM    Glucose 154 (H) 11/28/2019 04:16 AM    BUN 14 11/28/2019 04:16 AM    Creatinine 1.05 (H) 11/28/2019 04:16 AM    BUN/Creatinine ratio 13 11/28/2019 04:16 AM    GFR est AA >60 11/28/2019 04:16 AM    GFR est non-AA 52 (L) 11/28/2019 04:16 AM    Calcium 8.4 (L) 11/28/2019 04:16 AM         Imaging:  None      IMPRESSION/PLAN:  Carolyn Frausto is a 65 y.o. female with a diagnosis of stage IA, grade 3, endometrial carcinoma. Following her RATLH, BSO, SPLND, she received vaginal brachytherapy at [facility]. On today's exam she was noted to have a small concerning area on the left upper vagina. I am suspicious for recurrent disease. The lesion was a few centimeters distal to the left vaginal apex. I biopsied the area today. I will follow up on those results and make recommendations at that time. I will let her other physicians at [facility] know as well.         Signed By: Adrianne Sweeney MD     9/24/2020/8:44 AM

## 2020-10-02 DIAGNOSIS — C54.1 ENDOMETRIAL CANCER (HCC): Primary | ICD-10-CM

## 2020-10-03 ENCOUNTER — HOSPITAL ENCOUNTER (OUTPATIENT)
Dept: CT IMAGING | Age: 70
Discharge: HOME | End: 2020-10-03
Attending: OBSTETRICS & GYNECOLOGY
Payer: MEDICARE

## 2020-10-03 DIAGNOSIS — C54.1 ENDOMETRIAL CANCER (HCC): ICD-10-CM

## 2020-10-03 LAB — CREAT BLD-MCNC: 0.8 MG/DL (ref 0.6–1.3)

## 2020-10-03 PROCEDURE — 74011000636 HC RX REV CODE- 636: Performed by: INTERNAL MEDICINE

## 2020-10-03 PROCEDURE — 74177 CT ABD & PELVIS W/CONTRAST: CPT

## 2020-10-03 PROCEDURE — 82565 ASSAY OF CREATININE: CPT

## 2020-10-03 PROCEDURE — 71260 CT THORAX DX C+: CPT

## 2020-10-03 PROCEDURE — 74011000258 HC RX REV CODE- 258: Performed by: INTERNAL MEDICINE

## 2020-10-03 RX ORDER — SODIUM CHLORIDE 0.9 % (FLUSH) 0.9 %
10 SYRINGE (ML) INJECTION
Status: COMPLETED | OUTPATIENT
Start: 2020-10-03 | End: 2020-10-03

## 2020-10-03 RX ADMIN — IOPAMIDOL 100 ML: 755 INJECTION, SOLUTION INTRAVENOUS at 09:41

## 2020-10-03 RX ADMIN — SODIUM CHLORIDE 100 ML: 900 INJECTION, SOLUTION INTRAVENOUS at 09:41

## 2020-10-03 RX ADMIN — Medication 10 ML: at 09:41

## 2020-10-03 RX ADMIN — IOHEXOL 50 ML: 240 INJECTION, SOLUTION INTRATHECAL; INTRAVASCULAR; INTRAVENOUS; ORAL at 09:41

## 2020-10-27 ENCOUNTER — VIRTUAL VISIT (OUTPATIENT)
Dept: GYNECOLOGY | Age: 70
End: 2020-10-27
Payer: MEDICARE

## 2020-10-27 DIAGNOSIS — C54.1 ENDOMETRIAL CANCER (HCC): Primary | ICD-10-CM

## 2020-10-27 PROCEDURE — 1090F PRES/ABSN URINE INCON ASSESS: CPT | Performed by: OBSTETRICS & GYNECOLOGY

## 2020-10-27 PROCEDURE — 99213 OFFICE O/P EST LOW 20 MIN: CPT | Performed by: OBSTETRICS & GYNECOLOGY

## 2020-10-27 PROCEDURE — G8432 DEP SCR NOT DOC, RNG: HCPCS | Performed by: OBSTETRICS & GYNECOLOGY

## 2020-10-27 PROCEDURE — G9899 SCRN MAM PERF RSLTS DOC: HCPCS | Performed by: OBSTETRICS & GYNECOLOGY

## 2020-10-27 PROCEDURE — 1101F PT FALLS ASSESS-DOCD LE1/YR: CPT | Performed by: OBSTETRICS & GYNECOLOGY

## 2020-10-27 PROCEDURE — G8399 PT W/DXA RESULTS DOCUMENT: HCPCS | Performed by: OBSTETRICS & GYNECOLOGY

## 2020-10-27 PROCEDURE — 3017F COLORECTAL CA SCREEN DOC REV: CPT | Performed by: OBSTETRICS & GYNECOLOGY

## 2020-10-27 PROCEDURE — G8427 DOCREV CUR MEDS BY ELIG CLIN: HCPCS | Performed by: OBSTETRICS & GYNECOLOGY

## 2020-10-27 PROCEDURE — G0463 HOSPITAL OUTPT CLINIC VISIT: HCPCS | Performed by: OBSTETRICS & GYNECOLOGY

## 2020-10-27 NOTE — PROGRESS NOTES
27 [facility] [address], [address]  P [phone]  F [phone]    Office Note  Patient ID:  Name:  Yaya Swift  MRN:  [id number]  :  1950/70 y.o. Date:  10/27/2020      HISTORY OF PRESENT ILLNESS:  Yaya Swift is a 65 y.o.  postmenopausal female with a diagnosis of stage IA, grade 3 endometrial cancer. She underwent a robotic assisted TLH, BSO, SPLND in 2019. She had minimal invasion, no LVSI, and negative washings. I did not recommend any adjuvant therapy, though I did discuss her risks of recurrence. She underwent genetic testing and had a VUS of the SMAD4 gene, while negative for any other mutations. She did have a second opinion at [address] [place] [address] with Dr. Tiara Perez, and ultimately underwent vaginal cuff brachytherapy there, which she completed in 2020. She presented recently for follow-up. She stated that she was enrolled in a clinical trial at [address] [place] [address] on the effects of the use of vaginal dilators. She was last seen there a few months prior. She was without complaints. She denied any vaginal bleeding outside of when she uses the dilator, but that is not every time. She denied pelvic or abdominal pain. On pelvic exam I noted a small friable nodular area on the left anterolateral vagina measuring about 1.5 cm. I performed a biopsy of this suspicious lesion. The mass was almost removed completely with the biopsy. FINAL PATHOLOGIC DIAGNOSIS   Left upper vaginal wall, biopsy:   Metastatic adenocarcinoma, compatible with endometrial primary (see comment)   Comment   Immunochemical stains are performed. Tumor cells are positive for ER, MN, and p16 (patchy) while negative for CEA and Napsin A. Given the patient's history, morphologic findings, and immunohistochemical staining pattern the above diagnosis is rendered. I ordered a CT of the chest/abdomen/pelvis to evaluate.   It did not demonstrate any other disease. I recommended radiation therapy and suggested that she follow-up at [facility] with her radiation oncologist there, Dr. Obi Garcia. She presents today to discuss treatment options, as she is not enthusiastic about radiation therapy. She also states she has another opinion scheduled with Dr. Sly Wallis at [facility] tomorrow. ROS:   and GI review:  Negative  Cardiopulmonary review:  Negative   Musculoskeletal:  Negative    A comprehensive review of systems was negative except for that written in the History of Present Illness. , 10 point ROS      Problem List:  Patient Active Problem List    Diagnosis Date Noted    Endometrial cancer ([facility] [address] 11/27/2019    Sinus bradycardia by electrocardiogram 11/21/2019    Pseudophakia of both eyes 05/29/2018    White coat syndrome without diagnosis of hypertension 08/07/2017    Living will, counseling/discussion 08/07/2017    Primary open angle glaucoma (POAG) 12/19/2016    Nuclear nonsenile cataract 12/19/2016    Vaccination refused by patient 10/06/2016    Colonoscopy refused 10/06/2016    Primary insomnia 10/06/2016    Glaucoma     Degenerative joint disease (DJD) of lumbar spine     GERD (gastroesophageal reflux disease)     Rheumatic fever     PUD (peptic ulcer disease)      PMH:  Past Medical History:   Diagnosis Date    BCC (basal cell carcinoma)     Cancer ([facility] [address] 10/2019    UTERINE    Degenerative joint disease (DJD) of lumbar spine     GERD (gastroesophageal reflux disease)     Glaucoma     Headache     Nausea & vomiting     PUD (peptic ulcer disease)     \"MANY YEARS AGO\"    Rheumatic fever     AS CHILD    Sinus bradycardia by electrocardiogram 11/21/2019      PSH:  Past Surgical History:   Procedure Laterality Date    HX COLPOSCOPY      abn pap    HX HEENT      SHAMA CATARACTS    HX HIP REPLACEMENT      x2    HX HYSTERECTOMY  11/27/2019    Dr. Abram Obrien  HX OTHER SURGICAL      DOG BIT SURGERY ON RIGHT HAND    HX WISDOM TEETH EXTRACTION        Social History:  Social History     Tobacco Use    Smoking status: Former Smoker     Packs/day: 0.50     Years: 20.00     Pack years: 10.00     Last attempt to quit: 10/6/1978     Years since quittin.0    Smokeless tobacco: Never Used    Tobacco comment: SOMOKE OFF AND ON   Substance Use Topics    Alcohol use: Yes     Comment: RARELY      Family History:  Family History   Problem Relation Age of Onset    Cancer Mother         lung    Arthritis-osteo Mother     Heart Disease Father         pacer    Arthritis-osteo Father     Cancer Father         SKIN - basal cell    Pacemaker Father     Heart Surgery Father         STENT    Colon Polyps Father         no firm diagnosis of any cancer    Diabetes Sister     Other Sister         \"SLIGHT MENTAL RETARDATION\" - aphasic    Colon Polyps Sister         hx of polyps, uncertain of cancer    Other Sister         \"GUILLIAN BARRE\"    Breast Cancer Sister 76    Cancer Sister 58        endometrial    No Known Problems Daughter         born imperforate anus    Anesth Problems Neg Hx       Medications: (reviewed)  Current Outpatient Medications   Medication Sig    latanoprost (XALATAN) 0.005 % ophthalmic solution Administer 1 Drop to both eyes nightly.  calcium carbonate (TUMS) 200 mg calcium (500 mg) chew Take 1 Tab by mouth daily.  psyllium husk (METAMUCIL PO) Take 1 Tab by mouth nightly.  vit A/vit C/vit E/zinc/copper (PRESERVISION AREDS PO) Take 1 Tab by mouth every fourty-eight (48) hours.  naproxen sodium (ALEVE) 220 mg cap Take  by mouth daily as needed.  SF 5000 PLUS 1.1 % crea TOOTHPASTE    timolol (TIMOPTIC-XE) 0.25 % ophthalmic gel-forming Administer 1 Drop to both eyes daily.  Omega-3 Fatty Acids 300 mg cap Take 3 Tabs by mouth daily.  multivitamin (ONE A DAY) tablet Take 1 Tab by mouth daily.  OR AREDS    cholecalciferol (VITAMIN D3) 1,000 unit tablet Take 1,000 Units by mouth daily. No current facility-administered medications for this visit. Allergies: (reviewed)  Allergies   Allergen Reactions    Shellfish Containing Products Diarrhea    Aspirin Other (comments)     heartburn    Penicillins Rash     Had PCN many years ago and had a rash. Has had it once since then with no reaction. OBJECTIVE:    Physical Exam:  VITAL SIGNS: There were no vitals filed for this visit. There is no height or weight on file to calculate BMI. GENERAL SHAMEKA:    HEENT:    RESPIRATORY:    CARDIOVASC:    GASTROINT:    MUSCULOSKEL:    EXTREMITIES:    PELVIC:    RECTAL:    EDEL SURVEY:    NEURO:        Lab Data:    Lab Results   Component Value Date/Time    WBC 12.7 (H) 11/28/2019 04:16 AM    HGB 11.3 (L) 11/28/2019 04:16 AM    HCT 34.7 (L) 11/28/2019 04:16 AM    PLATELET 487 36/82/6132 04:16 AM    MCV 94.3 11/28/2019 04:16 AM     Lab Results   Component Value Date/Time    Sodium 134 (L) 11/28/2019 04:16 AM    Potassium 3.8 11/28/2019 04:16 AM    Chloride 102 11/28/2019 04:16 AM    CO2 27 11/28/2019 04:16 AM    Anion gap 5 11/28/2019 04:16 AM    Glucose 154 (H) 11/28/2019 04:16 AM    BUN 14 11/28/2019 04:16 AM    Creatinine 1.05 (H) 11/28/2019 04:16 AM    BUN/Creatinine ratio 13 11/28/2019 04:16 AM    GFR est AA >60 11/28/2019 04:16 AM    GFR est non-AA 52 (L) 11/28/2019 04:16 AM    Calcium 8.4 (L) 11/28/2019 04:16 AM         CT of chest/abdomen/pelvis (10/3/20)  Chest:  Lungs: There is a calcified granuloma within the right lower lobe. Lungs are  otherwise clear.     Lymph nodes: There is no mediastinal, hilar or axillary lymphadenopathy. There  are several calcified mediastinal and right hilar lymph nodes.     Pleura: There is no prior study for direct comparison.     Heart: The heart is normal in size and there is no pericardial fluid.     Bones: No lytic or sclerotic osseous lesion is visualized.     Abdomen/pelvis:  Liver:  There is diffuse fatty infiltration of the liver. There is no suspicious  focal hepatic lesion.     Spleen: The spleen is normal.     Pancreas: The pancreas is normal.     Adrenals: The adrenals are normal.     Gallbladder: There is a gallstone in the gallbladder; there is no CT evidence of  acute cholecystitis.      Kidneys: There is a 2 mm non-obstructing left renal calculus. There are 2  subcentimeter left renal hypodensities, too small to further characterize, but  statistically likely to represent cysts. Note is made of a left retroaortic  renal vein.     Lymph nodes\omentum\peritoneum. There is no marisa hepatis, mesenteric,  retroperitoneal or pelvic lymphadenopathy. No intra-abdominal soft tissue nodule  or mass is seen.     Bowel: No dilated or thickened loop of large or small bowel is seen.     Urinary bladder: Urinary bladder is partially filled and grossly normal.     Bones: There is beam hardening artifact in the pelvis related to bilateral hip  prostheses. No lytic or sclerotic osseous lesion is visualized.     Miscellaneous: There is no free intraperitoneal gas or fluid. There is no focal  fluid collection to suggest abscess. Uterus is absent. The left and right  ovaries are not visualized, however there are two adjacent cysts within the left  hemipelvis, the larger of the two measures 2.3 cm x 2.2 cm. IMPRESSION: No evidence of metastatic disease within the chest, abdomen or  pelvis. Two adjacent cysts within the left hemipelvis, arising from the left  ovary. IMPRESSION/PLAN:  Ayaka Valdez is a 65 y.o. female with a diagnosis of stage IA, grade 3, endometrial carcinoma. Following her RATLH, BSO, SPLND, she received vaginal brachytherapy at [facility]. 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 Her CT of the chest/abdomen/pelvis did not demonstrate any other evidence of disease. I again explained that radiation therapy would be the standard of care.   Surgical excision of the area might be beneficial, but still might not eliminate the need for radiation. I don't think chemotherapy or immunotherapy would be good options, especially with radiation would likely be curative. I'm not sure why she is so opposed to radiation and I tried to alleviate her concerns regarding the treatment. I explained to her that I will remain available to assist in any way. Signed By: Burak Edwards MD     10/27/2020/8:44 AM     Daniel Keita is a 65 y.o. female who was seen by synchronous (real-time) audio-video technology on 10/27/2020 for Follow-up (2nd opinion on tx)        Assessment & Plan:   Diagnoses and all orders for this visit:    1. Endometrial cancer ([facility] [address]        I spent at least 15 minutes on this visit with this established patient. 712  Subjective:       Prior to Admission medications    Medication Sig Start Date End Date Taking? Authorizing Provider   latanoprost (XALATAN) 0.005 % ophthalmic solution Administer 1 Drop to both eyes nightly. Yes Provider, Historical   calcium carbonate (TUMS) 200 mg calcium (500 mg) chew Take 1 Tab by mouth daily. Yes Provider, Historical   psyllium husk (METAMUCIL PO) Take 1 Tab by mouth nightly. Yes Provider, Historical   vit A/vit C/vit E/zinc/copper (PRESERVISION AREDS PO) Take 1 Tab by mouth every fourty-eight (48) hours. Yes Provider, Historical   naproxen sodium (ALEVE) 220 mg cap Take  by mouth daily as needed. Yes Provider, Historical   SF 5000 PLUS 1.1 % crea TOOTHPASTE 9/18/19  Yes Provider, Historical   timolol (TIMOPTIC-XE) 0.25 % ophthalmic gel-forming Administer 1 Drop to both eyes daily. 9/21/16  Yes Provider, Historical   Omega-3 Fatty Acids 300 mg cap Take 3 Tabs by mouth daily. Yes Provider, Historical   multivitamin (ONE A DAY) tablet Take 1 Tab by mouth daily. OR AREDS   Yes Provider, Historical   cholecalciferol (VITAMIN D3) 1,000 unit tablet Take 1,000 Units by mouth daily.    Yes Provider, Historical Patient Active Problem List   Diagnosis Code    Glaucoma H40.9    Degenerative joint disease (DJD) of lumbar spine M47.816    GERD (gastroesophageal reflux disease) K21.9    Rheumatic fever I00    PUD (peptic ulcer disease) K27.9    Vaccination refused by patient Z35.24    Colonoscopy refused Z53.20    Primary insomnia F51.01    White coat syndrome without diagnosis of hypertension R03.0    Living will, counseling/discussion Z71.89    Sinus bradycardia by electrocardiogram R00.1    Endometrial cancer ([facility] [address] C54.1    Primary open angle glaucoma (POAG) H40.1190    Nuclear 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    Pseudophakia of both eyes Z96.1     Patient Active Problem List    Diagnosis Date Noted    Endometrial cancer ([facility] [address] 11/27/2019    Sinus bradycardia by electrocardiogram 11/21/2019    Pseudophakia of both eyes 05/29/2018    White coat syndrome without diagnosis of hypertension 08/07/2017    Living will, counseling/discussion 08/07/2017    Primary open angle glaucoma (POAG) 12/19/2016    Nuclear nonsenile cataract 12/19/2016    Vaccination refused by patient 10/06/2016    Colonoscopy refused 10/06/2016    Primary insomnia 10/06/2016    Glaucoma     Degenerative joint disease (DJD) of lumbar spine     GERD (gastroesophageal reflux disease)     Rheumatic fever     PUD (peptic ulcer disease)      Current Outpatient Medications   Medication Sig Dispense Refill    latanoprost (XALATAN) 0.005 % ophthalmic solution Administer 1 Drop to both eyes nightly.  calcium carbonate (TUMS) 200 mg calcium (500 mg) chew Take 1 Tab by mouth daily.  psyllium husk (METAMUCIL PO) Take 1 Tab by mouth nightly.  vit A/vit C/vit E/zinc/copper (PRESERVISION AREDS PO) Take 1 Tab by mouth every fourty-eight (48) hours.  naproxen sodium (ALEVE) 220 mg cap Take  by mouth daily as needed.       SF 5000 PLUS 1.1 % crea TOOTHPASTE  4    timolol (TIMOPTIC-XE) 0.25 % ophthalmic gel-forming Administer 1 Drop to both eyes daily. 0    Omega-3 Fatty Acids 300 mg cap Take 3 Tabs by mouth daily.  multivitamin (ONE A DAY) tablet Take 1 Tab by mouth daily. OR AREDS      cholecalciferol (VITAMIN D3) 1,000 unit tablet Take 1,000 Units by mouth daily. Allergies   Allergen Reactions    Shellfish Containing Products Diarrhea    Aspirin Other (comments)     heartburn    Penicillins Rash     Had PCN many years ago and had a rash. Has had it once since then with no reaction.      Past Medical History:   Diagnosis Date    BCC (basal cell carcinoma)     Cancer (HCC) 10/2019    UTERINE    Degenerative joint disease (DJD) of lumbar spine     GERD (gastroesophageal reflux disease)     Glaucoma     Headache     Nausea & vomiting     PUD (peptic ulcer disease)     \"MANY YEARS AGO\"    Rheumatic fever     AS CHILD    Sinus bradycardia by electrocardiogram 11/21/2019     Past Surgical History:   Procedure Laterality Date    HX COLPOSCOPY      abn pap    HX HEENT      SHAMA CATARACTS    HX HIP REPLACEMENT      x2    HX HYSTERECTOMY  11/27/2019    Dr. Vadim Ramirez HX ORTHOPAEDIC      SHAMA HIP REPLACEMENT    HX ORTHOPAEDIC      LEFT ELBOW FOR FX X2    HX OTHER SURGICAL      DOG BIT SURGERY ON RIGHT HAND    HX WISDOM TEETH EXTRACTION       Family History   Problem Relation Age of Onset    Cancer Mother         lung    Arthritis-osteo Mother     Heart Disease Father         pacer    Arthritis-osteo Father     Cancer Father         SKIN - basal cell    Pacemaker Father     Heart Surgery Father         STENT    Colon Polyps Father         no firm diagnosis of any cancer    Diabetes Sister     Other Sister         \"SLIGHT MENTAL RETARDATION\" - aphasic    Colon Polyps Sister         hx of polyps, uncertain of cancer    Other Sister         \"GUILLIAN BARRE\"    Breast Cancer Sister 76    Cancer Sister 58        endometrial    No Known Problems Daughter         born imperforate anus    Anesth Problems Neg Hx      Social History     Tobacco Use    Smoking status: Former Smoker     Packs/day: 0.50     Years: 20.00     Pack years: 10.00     Last attempt to quit: 10/6/1978     Years since quittin.0    Smokeless tobacco: Never Used    Tobacco comment: SOMOKE OFF AND ON   Substance Use Topics    Alcohol use: Yes     Comment: RARELY       ROS    Objective:   No flowsheet data found. General: alert, cooperative, no distress   Mental  status: normal mood, behavior, speech, dress, motor activity, and thought processes, able to follow commands   HENT: NCAT   Neck: no visualized mass   Resp: no respiratory distress   Neuro: no gross deficits   Skin: no discoloration or lesions of concern on visible areas   Psychiatric: normal affect, consistent with stated mood, no evidence of hallucinations     Additional exam findings: We discussed the expected course, resolution and complications of the diagnosis(es) in detail. Medication risks, benefits, costs, interactions, and alternatives were discussed as indicated. I advised her to contact the office if her condition worsens, changes or fails to improve as anticipated. She expressed understanding with the diagnosis(es) and plan. Gwendolyn Rust, who was evaluated through a patient-initiated, synchronous (real-time) audio-video encounter, and/or her healthcare decision maker, is aware that it is a billable service, with coverage as determined by her insurance carrier. She provided verbal consent to proceed: Yes, and patient identification was verified. It was conducted pursuant to the emergency declaration under the [facility], [address] [place] [address] authority and the [employer] Act. A caregiver was present when appropriate. Ability to conduct physical exam was limited. I was in the office. The patient was at home.       Jeffery Seals MD

## 2020-10-28 ENCOUNTER — TELEPHONE (OUTPATIENT)
Dept: GYNECOLOGY | Age: 70
End: 2020-10-28

## 2020-10-28 NOTE — TELEPHONE ENCOUNTER
Called in stating that they are missing parts of the office notes from yesterday's apt.     Fax number: [phone]    [facility]# [phone]

## 2021-01-12 ENCOUNTER — VIRTUAL VISIT (OUTPATIENT)
Dept: GYNECOLOGY | Age: 71
End: 2021-01-12
Payer: MEDICARE

## 2021-01-12 DIAGNOSIS — C54.1 ENDOMETRIAL CANCER (HCC): Primary | ICD-10-CM

## 2021-01-12 PROCEDURE — 99442 PR PHYS/QHP TELEPHONE EVALUATION 11-20 MIN: CPT | Performed by: OBSTETRICS & GYNECOLOGY

## 2021-01-12 NOTE — PROGRESS NOTES
[facility] [address], [address]  P [phone]  F [phone]    Office Note  Patient ID:  Name:  Quinton Batres  MRN:  [id number]  :  1950/70 y.o. Date:  2021      HISTORY OF PRESENT ILLNESS:  Quinton Batres is a 65 y.o.  postmenopausal female who is an established patient with a diagnosis of stage IA, grade 3 endometrial cancer. She underwent a robotic assisted TLH, BSO, SPLND in 2019. She had minimal invasion, no LVSI, and negative washings. I did not recommend any adjuvant therapy, though I did discuss her risks of recurrence. She underwent genetic testing and had a VUS of the SMAD4 gene, while negative for any other mutations. She did have a second opinion at [facility] with Dr. Darius Mcdowell, and ultimately underwent vaginal cuff brachytherapy there, which she completed in 2020. She presented in September for follow-up. She stated that she was enrolled in a clinical trial at [facility] on the effects of the use of vaginal dilators. She was last seen there a few months prior. She was without complaints. She denied any vaginal bleeding outside of when she uses the dilator, but that is not every time. She denied pelvic or abdominal pain. On pelvic exam I noted a small friable nodular area on the left anterolateral vagina measuring about 1.5 cm. I performed a biopsy of this suspicious lesion. The mass was almost removed completely with the biopsy. FINAL PATHOLOGIC DIAGNOSIS   Left upper vaginal wall, biopsy:   Metastatic adenocarcinoma, compatible with endometrial primary (see comment)   Comment   Immunochemical stains are performed. Tumor cells are positive for ER, MI, and p16 (patchy) while negative for CEA and Napsin A. Given the patient's history, morphologic findings, and immunohistochemical staining pattern the above diagnosis is rendered. I ordered a CT of the chest/abdomen/pelvis to evaluate. It did not demonstrate any other disease. I recommended radiation therapy and suggested that she follow-up at [facility] with her radiation oncologist there, Dr. Kalin Munson. She presented subsequently to discuss treatment options, as she was not enthusiastic about radiation therapy. She also stated she had another opinion scheduled with Dr. Luis Miguel Maravilla at [facility] for the following day. He ordered a PET/CT that showed no evidence of disease outside of the pelvis. He essentially told her the same thing and recommended pelvic radiation therapy. Her referred her to Dr. Cyndee Joe with [facility] for treatment. Since then she has gone back to see Dr. Richard Huertas at [facility], as well as Dr. Liana Carroll at [facility]. She states that she and Dr. Constance Hickman didn't mesh. She presents today to ask my opinion on treatment and for my recommendations. ROS:   and GI review:  Negative  Cardiopulmonary review:  Negative   Musculoskeletal:  Negative    A comprehensive review of systems was negative except for that written in the History of Present Illness. , 10 point ROS      Problem List:  Patient Active Problem List    Diagnosis Date Noted    Endometrial cancer ([facility] 11/27/2019    Sinus bradycardia by electrocardiogram 11/21/2019    Pseudophakia of both eyes 05/29/2018    White coat syndrome without diagnosis of hypertension 08/07/2017    Living will, counseling/discussion 08/07/2017    Primary open angle glaucoma (POAG) 12/19/2016    Nuclear nonsenile cataract 12/19/2016    Vaccination refused by patient 10/06/2016    Colonoscopy refused 10/06/2016    Primary insomnia 10/06/2016    Glaucoma     Degenerative joint disease (DJD) of lumbar spine     GERD (gastroesophageal reflux disease)     Rheumatic fever     PUD (peptic ulcer disease)      PMH:  Past Medical History:   Diagnosis Date    BCC (basal cell carcinoma)     Cancer ([facility] 10/2019    UTERINE    Degenerative joint disease (DJD) of lumbar spine     GERD (gastroesophageal reflux disease)     Glaucoma     Headache     Nausea & vomiting     PUD (peptic ulcer disease)     \"MANY YEARS AGO\"    Rheumatic fever     AS CHILD    Sinus bradycardia by electrocardiogram 2019      PSH:  Past Surgical History:   Procedure Laterality Date    HX COLPOSCOPY      abn pap    HX HEENT      SHAMA CATARACTS    HX HIP REPLACEMENT      x2    HX HYSTERECTOMY  2019    Dr. Dana De Guzman HX ORTHOPAEDIC      LEFT ELBOW FOR FX X2    HX OTHER SURGICAL      DOG BIT SURGERY ON RIGHT HAND    HX WISDOM TEETH EXTRACTION        Social History:  Social History     Tobacco Use    Smoking status: Former Smoker     Packs/day: 0.50     Years: 20.00     Pack years: 10.00     Quit date: 10/6/1978     Years since quittin.2    Smokeless tobacco: Never Used    Tobacco comment: SOMOKE OFF AND ON   Substance Use Topics    Alcohol use: Yes     Comment: RARELY      Family History:  Family History   Problem Relation Age of Onset    Cancer Mother         lung    Arthritis-osteo Mother     Heart Disease Father         pacer    Arthritis-osteo Father     Cancer Father         SKIN - basal cell    Pacemaker Father     Heart Surgery Father         STENT    Colon Polyps Father         no firm diagnosis of any cancer    Diabetes Sister     Other Sister         \"SLIGHT MENTAL RETARDATION\" - aphasic    Colon Polyps Sister         hx of polyps, uncertain of cancer    Other Sister         \"GUILLIAN BARRE\"    Breast Cancer Sister 76    Cancer Sister 58        endometrial    No Known Problems Daughter         born imperforate anus    Anesth Problems Neg Hx       Medications: (reviewed)  Current Outpatient Medications   Medication Sig    latanoprost (XALATAN) 0.005 % ophthalmic solution Administer 1 Drop to both eyes nightly.  calcium carbonate (TUMS) 200 mg calcium (500 mg) chew Take 1 Tab by mouth daily.     psyllium husk (METAMUCIL PO) Take 1 Tab by mouth nightly.  vit A/vit C/vit E/zinc/copper (PRESERVISION AREDS PO) Take 1 Tab by mouth every fourty-eight (48) hours.  naproxen sodium (ALEVE) 220 mg cap Take  by mouth daily as needed.  SF 5000 PLUS 1.1 % crea TOOTHPASTE    timolol (TIMOPTIC-XE) 0.25 % ophthalmic gel-forming Administer 1 Drop to both eyes daily.  Omega-3 Fatty Acids 300 mg cap Take 3 Tabs by mouth daily.  multivitamin (ONE A DAY) tablet Take 1 Tab by mouth daily. OR AREDS    cholecalciferol (VITAMIN D3) 1,000 unit tablet Take 1,000 Units by mouth daily. No current facility-administered medications for this visit. Allergies: (reviewed)  Allergies   Allergen Reactions    Shellfish Containing Products Diarrhea    Aspirin Other (comments)     heartburn    Penicillins Rash     Had PCN many years ago and had a rash. Has had it once since then with no reaction. OBJECTIVE:    Physical Exam:  VITAL SIGNS: There were no vitals filed for this visit. There is no height or weight on file to calculate BMI.    GENERAL SHAMEKA:    HEENT:    RESPIRATORY:    CARDIOVASC:    GASTROINT:    MUSCULOSKEL:    EXTREMITIES:    PELVIC:    RECTAL:    EDEL SURVEY:    NEURO:        Lab Data:    Lab Results   Component Value Date/Time    WBC 12.7 (H) 11/28/2019 04:16 AM    HGB 11.3 (L) 11/28/2019 04:16 AM    HCT 34.7 (L) 11/28/2019 04:16 AM    PLATELET 445 87/73/0245 04:16 AM    MCV 94.3 11/28/2019 04:16 AM     Lab Results   Component Value Date/Time    Sodium 134 (L) 11/28/2019 04:16 AM    Potassium 3.8 11/28/2019 04:16 AM    Chloride 102 11/28/2019 04:16 AM    CO2 27 11/28/2019 04:16 AM    Anion gap 5 11/28/2019 04:16 AM    Glucose 154 (H) 11/28/2019 04:16 AM    BUN 14 11/28/2019 04:16 AM    Creatinine 1.05 (H) 11/28/2019 04:16 AM    BUN/Creatinine ratio 13 11/28/2019 04:16 AM    GFR est AA >60 11/28/2019 04:16 AM    GFR est non-AA 52 (L) 11/28/2019 04:16 AM    Calcium 8.4 (L) 11/28/2019 04:16 AM         CT of chest/abdomen/pelvis (10/3/20)  Chest:  Lungs: There is a calcified granuloma within the right lower lobe. Lungs are  otherwise clear.     Lymph nodes: There is no mediastinal, hilar or axillary lymphadenopathy. There  are several calcified mediastinal and right hilar lymph nodes.     Pleura: There is no prior study for direct comparison.     Heart: The heart is normal in size and there is no pericardial fluid.     Bones: No lytic or sclerotic osseous lesion is visualized.     Abdomen/pelvis:  Liver: There is diffuse fatty infiltration of the liver. There is no suspicious  focal hepatic lesion.     Spleen: The spleen is normal.     Pancreas: The pancreas is normal.     Adrenals: The adrenals are normal.     Gallbladder: There is a gallstone in the gallbladder; there is no CT evidence of  acute cholecystitis.      Kidneys: There is a 2 mm non-obstructing left renal calculus. There are 2  subcentimeter left renal hypodensities, too small to further characterize, but  statistically likely to represent cysts. Note is made of a left retroaortic  renal vein.     Lymph nodes\omentum\peritoneum. There is no marisa hepatis, mesenteric,  retroperitoneal or pelvic lymphadenopathy. No intra-abdominal soft tissue nodule  or mass is seen.     Bowel: No dilated or thickened loop of large or small bowel is seen.     Urinary bladder: Urinary bladder is partially filled and grossly normal.     Bones: There is beam hardening artifact in the pelvis related to bilateral hip  prostheses. No lytic or sclerotic osseous lesion is visualized.     Miscellaneous: There is no free intraperitoneal gas or fluid. There is no focal  fluid collection to suggest abscess. Uterus is absent. The left and right  ovaries are not visualized, however there are two adjacent cysts within the left  hemipelvis, the larger of the two measures 2.3 cm x 2.2 cm. IMPRESSION: No evidence of metastatic disease within the chest, abdomen or  pelvis.  Two adjacent cysts within the left hemipelvis, arising from the left  ovary. IMPRESSION/PLAN:  Areli Craig is a 65 y.o. female with a diagnosis of stage IA, grade 3, endometrial carcinoma. Following her RATLH, BSO, SPLND, she received vaginal brachytherapy at [facility]. 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 Her CT of the chest/abdomen/pelvis did not demonstrate any other evidence of disease. Her PET/CT at [facility] reportedly showed no evidence of disease, although I don't have a copy of that result. I again explained to her that radiation therapy would be the standard of care. Surgical excision of the area might be an option, but still might not eliminate the need for radiation. I again explained that I don't think chemotherapy, immunotherapy, or hormonal therapy would be good options, especially with radiation likely being a curative treatment. I'm still not sure why she is so opposed to radiation and I again tried to alleviate her concerns regarding the treatment. I stressed that she needs to get started on therapy as soon as possible. We have known about this recurrence since late September. Every day she delays treatment she is decreasing her chances at salvage. She states that she understands, but she still remains hesitant. I explained to her that I will remain available to assist in any way. Areli Craig is a 65 y.o. female, evaluated via audio-only technology on 1/12/2021 for Referral / Consult (Virtual visit. Discuss treatment)      Assessment & Plan:   Diagnoses and all orders for this visit:    1. Endometrial cancer ([facility] [address]    2  Subjective:       Prior to Admission medications    Medication Sig Start Date End Date Taking? Authorizing Provider   latanoprost (XALATAN) 0.005 % ophthalmic solution Administer 1 Drop to both eyes nightly. Yes Provider, Historical   calcium carbonate (TUMS) 200 mg calcium (500 mg) chew Take 1 Tab by mouth daily.    Yes Provider, Historical psyllium husk (METAMUCIL PO) Take 1 Tab by mouth nightly. Yes Provider, Historical   vit A/vit C/vit E/zinc/copper (PRESERVISION AREDS PO) Take 1 Tab by mouth every fourty-eight (48) hours. Yes Provider, Historical   naproxen sodium (ALEVE) 220 mg cap Take  by mouth daily as needed. Yes Provider, Historical   SF 5000 PLUS 1.1 % crea TOOTHPASTE 9/18/19  Yes Provider, Historical   timolol (TIMOPTIC-XE) 0.25 % ophthalmic gel-forming Administer 1 Drop to both eyes daily. 9/21/16  Yes Provider, Historical   Omega-3 Fatty Acids 300 mg cap Take 3 Tabs by mouth daily. Yes Provider, Historical   multivitamin (ONE A DAY) tablet Take 1 Tab by mouth daily. OR AREDS   Yes Provider, Historical   cholecalciferol (VITAMIN D3) 1,000 unit tablet Take 1,000 Units by mouth daily.    Yes Provider, Historical     Patient Active Problem List   Diagnosis Code    Glaucoma H40.9    Degenerative joint disease (DJD) of lumbar spine M47.816    GERD (gastroesophageal reflux disease) K21.9    Rheumatic fever I00    PUD (peptic ulcer disease) K27.9    Vaccination refused by patient Z35.24    Colonoscopy refused Z53.20    Primary insomnia F51.01    White coat syndrome without diagnosis of hypertension R03.0    Living will, counseling/discussion Z71.89    Sinus bradycardia by electrocardiogram R00.1    Endomet[facility] cancer ([facility] C54.1    Primary open angle glaucoma (POAG) H40.1190    Nuclear 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    Pseudophakia of both eyes Z96.1     Patient Active Problem List    Diagnosis Date Noted    [facility]omet[facility] ([facility] 11/27/2019    Sinus bradycardia by electrocardiogram 11/21/2019    Pseudophakia of both eyes 05/29/2018    White coat syndrome without diagnosis of hypertension 08/07/2017    Living will, counseling/discussion 08/07/2017    Primary open angle glaucoma (POAG) 12/19/2016    Nuclear nonsenile cataract 12/19/2016    Vaccination refused by patient 10/06/2016    Colonoscopy refused 10/06/2016    Primary insomnia 10/06/2016    Glaucoma     Degenerative joint disease (DJD) of lumbar spine     GERD (gastroesophageal reflux disease)     Rheumatic fever     PUD (peptic ulcer disease)      Current Outpatient Medications   Medication Sig Dispense Refill    latanoprost (XALATAN) 0.005 % ophthalmic solution Administer 1 Drop to both eyes nightly.  calcium carbonate (TUMS) 200 mg calcium (500 mg) chew Take 1 Tab by mouth daily.  psyllium husk (METAMUCIL PO) Take 1 Tab by mouth nightly.  vit A/vit C/vit E/zinc/copper (PRESERVISION AREDS PO) Take 1 Tab by mouth every fourty-eight (48) hours.  naproxen sodium (ALEVE) 220 mg cap Take  by mouth daily as needed.  SF 5000 PLUS 1.1 % crea TOOTHPASTE  4    timolol (TIMOPTIC-XE) 0.25 % ophthalmic gel-forming Administer 1 Drop to both eyes daily. 0    Omega-3 Fatty Acids 300 mg cap Take 3 Tabs by mouth daily.  multivitamin (ONE A DAY) tablet Take 1 Tab by mouth daily. OR AREDS      cholecalciferol (VITAMIN D3) 1,000 unit tablet Take 1,000 Units by mouth daily. Allergies   Allergen Reactions    Shellfish Containing Products Diarrhea    Aspirin Other (comments)     heartburn    Penicillins Rash     Had PCN many years ago and had a rash. Has had it once since then with no reaction.      Past Medical History:   Diagnosis Date    BCC (basal cell carcinoma)     Cancer (HCC) 10/2019    UTERINE    Degenerative joint disease (DJD) of lumbar spine     GERD (gastroesophageal reflux disease)     Glaucoma     Headache     Nausea & vomiting     PUD (peptic ulcer disease)     \"MANY YEARS AGO\"    Rheumatic fever     AS CHILD    Sinus bradycardia by electrocardiogram 11/21/2019     Past Surgical History:   Procedure Laterality Date    HX COLPOSCOPY      abn pap    HX HEENT      SHAMA CATARACTS    HX HIP REPLACEMENT      x2    HX HYSTERECTOMY  11/27/2019    Dr. Emmanuel Dela Cruz LEFT ELBOW FOR FX X2    HX OTHER SURGICAL      DOG BIT SURGERY ON RIGHT HAND    HX WISDOM TEETH EXTRACTION       Family History   Problem Relation Age of Onset    Cancer Mother         lung    Arthritis-osteo Mother     Heart Disease Father         pacer    Arthritis-osteo Father     Cancer Father         SKIN - basal cell    Pacemaker Father     Heart Surgery Father         STENT    Colon Polyps Father         no firm diagnosis of any cancer    Diabetes Sister     Other Sister         \"SLIGHT MENTAL RETARDATION\" - aphasic    Colon Polyps Sister         hx of polyps, uncertain of cancer    Other Sister         \"GUILLIAN BARRE\"    Breast Cancer Sister 76    Cancer Sister 58        endometrial    No Known Problems Daughter         born imperforate anus    Anesth Problems Neg Hx      Social History     Tobacco Use    Smoking status: Former Smoker     Packs/day: 0.50     Years: 20.00     Pack years: 10.00     Quit date: 10/6/1978     Years since quittin.2    Smokeless tobacco: Never Used    Tobacco comment: SOMOKE OFF AND ON   Substance Use Topics    Alcohol use: Yes     Comment: RARELY       ROS    No flowsheet data found. Magdalena Lemus, who was evaluated through a patient-initiated, synchronous (real-time) audio only encounter, and/or her healthcare decision maker, is aware that it is a billable service, with coverage as determined by her insurance carrier. She provided verbal consent to proceed: Yes. She has not had a related appointment within my department in the past 7 days or scheduled within the next 24 hours. Total Time: minutes: 11-20 minutes    An electronic signature was used to sign this note.     Meghan Helms MD  21   3:28 PM

## 2021-02-23 ENCOUNTER — TELEPHONE (OUTPATIENT)
Dept: NUTRITION | Age: 71
End: 2021-02-23

## 2021-02-23 NOTE — TELEPHONE ENCOUNTER
RD returned pt's phone call. She is currently going through more radiation treatments. She states there is not a RD as part of her treatment center and has questions about diarrhea and probiotics. Discussed potassium sources and will defer questions of probiotic to Cancer RD specialist. Pt will follow up after treatment as able. Pt notes previous billing issue has been resolved.      Emily Solomon, MS, RD, CSSD

## 2021-04-20 ENCOUNTER — OFFICE VISIT (OUTPATIENT)
Dept: GYNECOLOGY | Age: 71
End: 2021-04-20
Payer: MEDICARE

## 2021-04-20 VITALS
SYSTOLIC BLOOD PRESSURE: 151 MMHG | DIASTOLIC BLOOD PRESSURE: 85 MMHG | HEART RATE: 60 BPM | WEIGHT: 161.8 LBS | BODY MASS INDEX: 25.39 KG/M2 | HEIGHT: 67 IN

## 2021-04-20 DIAGNOSIS — C54.1 ENDOMETRIAL CANCER (HCC): Primary | ICD-10-CM

## 2021-04-20 PROCEDURE — 3017F COLORECTAL CA SCREEN DOC REV: CPT | Performed by: OBSTETRICS & GYNECOLOGY

## 2021-04-20 PROCEDURE — 1101F PT FALLS ASSESS-DOCD LE1/YR: CPT | Performed by: OBSTETRICS & GYNECOLOGY

## 2021-04-20 PROCEDURE — 1090F PRES/ABSN URINE INCON ASSESS: CPT | Performed by: OBSTETRICS & GYNECOLOGY

## 2021-04-20 PROCEDURE — G8432 DEP SCR NOT DOC, RNG: HCPCS | Performed by: OBSTETRICS & GYNECOLOGY

## 2021-04-20 PROCEDURE — G8419 CALC BMI OUT NRM PARAM NOF/U: HCPCS | Performed by: OBSTETRICS & GYNECOLOGY

## 2021-04-20 PROCEDURE — G8399 PT W/DXA RESULTS DOCUMENT: HCPCS | Performed by: OBSTETRICS & GYNECOLOGY

## 2021-04-20 PROCEDURE — G8536 NO DOC ELDER MAL SCRN: HCPCS | Performed by: OBSTETRICS & GYNECOLOGY

## 2021-04-20 PROCEDURE — G8427 DOCREV CUR MEDS BY ELIG CLIN: HCPCS | Performed by: OBSTETRICS & GYNECOLOGY

## 2021-04-20 PROCEDURE — 99213 OFFICE O/P EST LOW 20 MIN: CPT | Performed by: OBSTETRICS & GYNECOLOGY

## 2021-04-20 PROCEDURE — G0463 HOSPITAL OUTPT CLINIC VISIT: HCPCS | Performed by: OBSTETRICS & GYNECOLOGY

## 2021-04-20 PROCEDURE — G9899 SCRN MAM PERF RSLTS DOC: HCPCS | Performed by: OBSTETRICS & GYNECOLOGY

## 2021-04-20 RX ORDER — TRIAMCINOLONE ACETONIDE 1 MG/G
CREAM TOPICAL 2 TIMES DAILY
COMMUNITY
End: 2022-06-17

## 2021-04-20 NOTE — PROGRESS NOTES
Three month check up for history of endometrial cancer. Patient reports last radiation on 4/2/21 at [facility] Pt states no abnormal spotting, bleeding or pain. Patient continues to have bowel changes and low back pain after radiation.

## 2021-04-20 NOTE — PROGRESS NOTES
[facility] [address], [address]  P [phone]  F [phone]    Office Note  Patient ID:  Name:  Amada Osorio  MRN:  [id number]  :  1950/71 y.o. Date:  2021      HISTORY OF PRESENT ILLNESS:  Amada Osorio is a 66 y.o.  postmenopausal female who is an established patient with a diagnosis of stage IA, grade 3 endometrial cancer. She underwent a robotic assisted TLH, BSO, SPLND in 2019. She had minimal invasion, no LVSI, and negative washings. I did not recommend any adjuvant therapy, though I did discuss her risks of recurrence. She underwent genetic testing and had a VUS of the SMAD4 gene, while negative for any other mutations. She did have a second opinion at [facility] with Dr. Dianna Villar, and ultimately underwent vaginal cuff brachytherapy there, which she completed in 2020. She presented in 2020 for follow-up. She stated that she was enrolled in a clinical trial at [facility] on the effects of the use of vaginal dilators. She was last seen there a few months prior. She was without complaints. She denied any vaginal bleeding outside of when she uses the dilator, but that is not every time. She denied pelvic or abdominal pain. On pelvic exam I noted a small friable nodular area on the left anterolateral vagina measuring about 1.5 cm. I performed a biopsy of this suspicious lesion. The mass was almost removed completely with the biopsy. FINAL PATHOLOGIC DIAGNOSIS   Left upper vaginal wall, biopsy:   Metastatic adenocarcinoma, compatible with endometrial primary (see comment)   Comment   Immunochemical stains are performed. Tumor cells are positive for ER, MN, and p16 (patchy) while negative for CEA and Napsin A. Given the patient's history, morphologic findings, and immunohistochemical staining pattern the above diagnosis is rendered.      I ordered a CT of the chest/abdomen/pelvis to evaluate. It did not demonstrate any other disease. I recommended radiation therapy and suggested that she follow-up at [facility] with her radiation oncologist there, Dr. Juanita De Dios. She presented subsequently to discuss treatment options, as she was not enthusiastic about pelvic radiation therapy. She also stated she had another opinion scheduled with Dr. Bebo Brian at [facility] for the following day. He ordered a PET/CT that showed no evidence of disease outside of the pelvis. He essentially told her the same thing and recommended pelvic radiation therapy. Her referred her to Dr. Gurdeep Brown with [facility] for treatment. Following those consultation she went back to see Dr. Eugene Nick at [facility], as well as Dr. Juliette Francis at [facility], for additional consultation. She stated that she and Dr. Raymond Barrow \"didn't mesh\". She presented to see me in January 2021 to ask my opinion on treatment and for my recommendations. I again explained to her that radiation therapy would be the standard of care. Surgical excision of the area might be an option, but still would not eliminate the need for radiation. I again explained that I didn't think chemotherapy, immunotherapy, or hormonal therapy would be good options, especially with radiation likely being a curative treatment. I was still not sure why she was so opposed to radiation and I again tried to alleviate her concerns regarding the treatment. I stressed that she needed to get started on therapy as soon as possible, as we had known about this recurrence since late September. I stressed that every day she delayed treatment she was decreasing her chances at salvage. She ultimately completed pelvic radiation therapy at [facility] with Dr. Eugene Nick, which she completed earlier this month. She had another PET/CT in February at [facility] prior to her radiation, which was reportedly negative, though I don't have a copy of those results.   She is recovering from her radiation therapy and reports only some diarrhea and bladder irritation. ROS:   and GI review:  Negative  Cardiopulmonary review:  Negative   Musculoskeletal:  Negative    A comprehensive review of systems was negative except for that written in the History of Present Illness. , 10 point ROS      Problem List:  Patient Active Problem List    Diagnosis Date Noted    Endometrial cancer ([facility] 2019    Sinus bradycardia by electrocardiogram 2019    Pseudophakia of both eyes 2018    White coat syndrome without diagnosis of hypertension 2017    Living will, counseling/discussion 2017    Primary open angle glaucoma (POAG) 2016    Nuclear nonsenile cataract 2016    Vaccination refused by patient 10/06/2016    Colonoscopy refused 10/06/2016    Primary insomnia 10/06/2016    Glaucoma     Degenerative joint disease (DJD) of lumbar spine     GERD (gastroesophageal reflux disease)     Rheumatic fever     PUD (peptic ulcer disease)      PMH:  Past Medical History:   Diagnosis Date    BCC (basal cell carcinoma)     Cancer ([facility] 10/2019    UTERINE    Degenerative joint disease (DJD) of lumbar spine     GERD (gastroesophageal reflux disease)     Glaucoma     Headache     Nausea & vomiting     PUD (peptic ulcer disease)     \"MANY YEARS AGO\"    Rheumatic fever     AS CHILD    Sinus bradycardia by electrocardiogram 2019      PSH:  Past Surgical History:   Procedure Laterality Date    HX COLPOSCOPY      abn pap    HX HEENT      SHAMA CATARACTS    HX HIP REPLACEMENT      x2    HX HYSTERECTOMY  2019    Dr. Cristina Palmer    HX ORTHOPAEDIC      LEFT ELBOW FOR FX X2    HX OTHER SURGICAL      DOG BIT SURGERY ON RIGHT HAND    HX WISDOM TEETH EXTRACTION        Social History:  Social History     Tobacco Use    Smoking status: Former Smoker     Packs/day: 0.50     Years: 20.00     Pack years: 10.00     Quit date: 10/6/1978     Years since quittin.5  Smokeless tobacco: Never Used    Tobacco comment: SOMOKE OFF AND ON   Substance Use Topics    Alcohol use: Yes     Comment: RARELY      Family History:  Family History   Problem Relation Age of Onset    Cancer Mother         lung    Arthritis-osteo Mother     Heart Disease Father         pacer    Arthritis-osteo Father     Cancer Father         SKIN - basal cell    Pacemaker Father     Heart Surgery Father         STENT    Colon Polyps Father         no firm diagnosis of any cancer    Diabetes Sister     Other Sister         \"SLIGHT MENTAL RETARDATION\" - aphasic    Colon Polyps Sister         hx of polyps, uncertain of cancer    Other Sister         \"GUILLIAN BARRE\"    Breast Cancer Sister 76    Cancer Sister 58        endometrial    No Known Problems Daughter         born imperforate anus    Anesth Problems Neg Hx       Medications: (reviewed)  Current Outpatient Medications   Medication Sig    triamcinolone acetonide (KENALOG) 0.1 % topical cream Apply  to affected area two (2) times a day. use thin layer    latanoprost (XALATAN) 0.005 % ophthalmic solution Administer 1 Drop to both eyes nightly.  calcium carbonate (TUMS) 200 mg calcium (500 mg) chew Take 1 Tab by mouth daily.  psyllium husk (METAMUCIL PO) Take 1 Tab by mouth nightly.  vit A/vit C/vit E/zinc/copper (PRESERVISION AREDS PO) Take 1 Tab by mouth every fourty-eight (48) hours.  naproxen sodium (ALEVE) 220 mg cap Take  by mouth daily as needed.  SF 5000 PLUS 1.1 % crea TOOTHPASTE    timolol (TIMOPTIC-XE) 0.25 % ophthalmic gel-forming Administer 1 Drop to both eyes daily.  Omega-3 Fatty Acids 300 mg cap Take 3 Tabs by mouth daily.  multivitamin (ONE A DAY) tablet Take 1 Tab by mouth daily. OR AREDS    cholecalciferol (VITAMIN D3) 1,000 unit tablet Take 1,000 Units by mouth daily. No current facility-administered medications for this visit.       Allergies: (reviewed)  Allergies   Allergen Reactions    Shellfish Containing Products Diarrhea    Aspirin Other (comments)     heartburn    Penicillins Rash     Had PCN many years ago and had a rash. Has had it once since then with no reaction. OBJECTIVE:    Physical Exam:  VITAL SIGNS: Vitals:    04/20/21 1050   BP: (!) 151/85   Pulse: 60   Weight: 161 lb 12.8 oz (73.4 kg)   Height: 5' 7.01\" (1.702 m)     Body mass index is 25.34 kg/m². GENERAL SHAMEKA: WD, WN, female in NAD   HEENT: WNL   RESPIRATORY: CTA   CARDIOVASC: RRR   GASTROINT: Soft, NT, ND   MUSCULOSKEL: WNL   EXTREMITIES: No edema   PELVIC: Normal external female genitalia. Normal vagina with radiation changes present. No lesions. No masses on bimanual exam.   RECTAL: Deferred   EDEL SURVEY: Negative    NEURO: Grossly intact       Lab Data:    Lab Results   Component Value Date/Time    WBC 12.7 (H) 11/28/2019 04:16 AM    HGB 11.3 (L) 11/28/2019 04:16 AM    HCT 34.7 (L) 11/28/2019 04:16 AM    PLATELET 683 92/73/7493 04:16 AM    MCV 94.3 11/28/2019 04:16 AM     Lab Results   Component Value Date/Time    Sodium 134 (L) 11/28/2019 04:16 AM    Potassium 3.8 11/28/2019 04:16 AM    Chloride 102 11/28/2019 04:16 AM    CO2 27 11/28/2019 04:16 AM    Anion gap 5 11/28/2019 04:16 AM    Glucose 154 (H) 11/28/2019 04:16 AM    BUN 14 11/28/2019 04:16 AM    Creatinine 1.05 (H) 11/28/2019 04:16 AM    BUN/Creatinine ratio 13 11/28/2019 04:16 AM    GFR est AA >60 11/28/2019 04:16 AM    GFR est non-AA 52 (L) 11/28/2019 04:16 AM    Calcium 8.4 (L) 11/28/2019 04:16 AM         CT of chest/abdomen/pelvis (10/3/20)  Chest:  Lungs: There is a calcified granuloma within the right lower lobe. Lungs are  otherwise clear.     Lymph nodes: There is no mediastinal, hilar or axillary lymphadenopathy. There  are several calcified mediastinal and right hilar lymph nodes.     Pleura: There is no prior study for direct comparison.     Heart:  The heart is normal in size and there is no pericardial fluid.     Bones: No lytic or sclerotic osseous lesion is visualized.     Abdomen/pelvis:  Liver: There is diffuse fatty infiltration of the liver. There is no suspicious  focal hepatic lesion.     Spleen: The spleen is normal.     Pancreas: The pancreas is normal.     Adrenals: The adrenals are normal.     Gallbladder: There is a gallstone in the gallbladder; there is no CT evidence of  acute cholecystitis.      Kidneys: There is a 2 mm non-obstructing left renal calculus. There are 2  subcentimeter left renal hypodensities, too small to further characterize, but  statistically likely to represent cysts. Note is made of a left retroaortic  renal vein.     Lymph nodes\omentum\peritoneum. There is no marisa hepatis, mesenteric,  retroperitoneal or pelvic lymphadenopathy. No intra-abdominal soft tissue nodule  or mass is seen.     Bowel: No dilated or thickened loop of large or small bowel is seen.     Urinary bladder: Urinary bladder is partially filled and grossly normal.     Bones: There is beam hardening artifact in the pelvis related to bilateral hip  prostheses. No lytic or sclerotic osseous lesion is visualized.     Miscellaneous: There is no free intraperitoneal gas or fluid. There is no focal  fluid collection to suggest abscess. Uterus is absent. The left and right  ovaries are not visualized, however there are two adjacent cysts within the left  hemipelvis, the larger of the two measures 2.3 cm x 2.2 cm. IMPRESSION: No evidence of metastatic disease within the chest, abdomen or  pelvis. Two adjacent cysts within the left hemipelvis, arising from the left  ovary. IMPRESSION/PLAN:  Marlo Rivero is a 66 y.o. female with a diagnosis of stage IA, grade 3, endometrial carcinoma. Following her RATLH, BSO, SPLND, she received vaginal brachytherapy at [facility]. She subsequently developed an isolated vaginal cuff recurrence in September 2020.   She was recommended pelvic radiation therapy by myself and multiple other consulting physicians. She ultimately completed whole pelvic radiation therapy at [address] [place] [address]. She has no evidence of disease on today's exam and appears to have tolerated radiation well. She wishes to have her surveillance follow-up here with me. I will plan on seeing her back in 3 months. We will consider repeat imaging in 3-6 months. An electronic signature was used to sign this note.     Jez Chavis MD  04/20/21

## 2021-07-20 ENCOUNTER — OFFICE VISIT (OUTPATIENT)
Dept: GYNECOLOGY | Age: 71
End: 2021-07-20
Payer: MEDICARE

## 2021-07-20 VITALS
DIASTOLIC BLOOD PRESSURE: 86 MMHG | WEIGHT: 161 LBS | BODY MASS INDEX: 25.27 KG/M2 | SYSTOLIC BLOOD PRESSURE: 156 MMHG | HEIGHT: 67 IN | HEART RATE: 65 BPM

## 2021-07-20 DIAGNOSIS — C54.1 ENDOMETRIAL CANCER (HCC): Primary | ICD-10-CM

## 2021-07-20 PROCEDURE — 1101F PT FALLS ASSESS-DOCD LE1/YR: CPT | Performed by: OBSTETRICS & GYNECOLOGY

## 2021-07-20 PROCEDURE — G8432 DEP SCR NOT DOC, RNG: HCPCS | Performed by: OBSTETRICS & GYNECOLOGY

## 2021-07-20 PROCEDURE — G9899 SCRN MAM PERF RSLTS DOC: HCPCS | Performed by: OBSTETRICS & GYNECOLOGY

## 2021-07-20 PROCEDURE — 1090F PRES/ABSN URINE INCON ASSESS: CPT | Performed by: OBSTETRICS & GYNECOLOGY

## 2021-07-20 PROCEDURE — G8419 CALC BMI OUT NRM PARAM NOF/U: HCPCS | Performed by: OBSTETRICS & GYNECOLOGY

## 2021-07-20 PROCEDURE — G8536 NO DOC ELDER MAL SCRN: HCPCS | Performed by: OBSTETRICS & GYNECOLOGY

## 2021-07-20 PROCEDURE — 99213 OFFICE O/P EST LOW 20 MIN: CPT | Performed by: OBSTETRICS & GYNECOLOGY

## 2021-07-20 PROCEDURE — G0463 HOSPITAL OUTPT CLINIC VISIT: HCPCS | Performed by: OBSTETRICS & GYNECOLOGY

## 2021-07-20 PROCEDURE — G8427 DOCREV CUR MEDS BY ELIG CLIN: HCPCS | Performed by: OBSTETRICS & GYNECOLOGY

## 2021-07-20 PROCEDURE — 3017F COLORECTAL CA SCREEN DOC REV: CPT | Performed by: OBSTETRICS & GYNECOLOGY

## 2021-07-20 PROCEDURE — G8399 PT W/DXA RESULTS DOCUMENT: HCPCS | Performed by: OBSTETRICS & GYNECOLOGY

## 2021-07-20 NOTE — PROGRESS NOTES
[address] [facility] [address], [address]  P [phone]  F [phone]    Office Note  Patient ID:  Name:  Annemarie Fay  MRN:  [id number]  :  1950/71 y.o. Date:  2021      HISTORY OF PRESENT ILLNESS:  Annemarie Fay is a 66 y.o.  postmenopausal female who is an established patient with a diagnosis of stage IA, grade 3 endometrial cancer. She underwent a robotic assisted TLH, BSO, SPLND in 2019. She had minimal invasion, no LVSI, and negative washings. I did not recommend any adjuvant therapy, though I did discuss her risks of recurrence. She underwent genetic testing and had a VUS of the SMAD4 gene, while negative for any other mutations. She did have a second opinion at [facility] with Dr. Armida Olmedo, and ultimately underwent vaginal cuff brachytherapy there, which she completed in 2020. She presented in 2020 for follow-up. She stated that she was enrolled in a clinical trial at [facility] on the effects of the use of vaginal dilators. She was last seen there a few months prior. She was without complaints. She denied any vaginal bleeding outside of when she uses the dilator, but that is not every time. She denied pelvic or abdominal pain. On pelvic exam I noted a small friable nodular area on the left anterolateral vagina measuring about 1.5 cm. I performed a biopsy of this suspicious lesion. The mass was almost removed completely with the biopsy. FINAL PATHOLOGIC DIAGNOSIS   Left upper vaginal wall, biopsy:   Metastatic adenocarcinoma, compatible with endometrial primary (see comment)   Comment   Immunochemical stains are performed. Tumor cells are positive for ER, OR, and p16 (patchy) while negative for CEA and Napsin A. Given the patient's history, morphologic findings, and immunohistochemical staining pattern the above diagnosis is rendered.      I ordered a CT of the chest/abdomen/pelvis to evaluate. It did not demonstrate any other disease. I recommended radiation therapy and suggested that she follow-up at [facility] with her radiation oncologist there, Dr. Ismael Coelho. She presented subsequently to discuss treatment options, as she was not enthusiastic about pelvic radiation therapy. She also stated she had another opinion scheduled with Dr. Erik Pinto at [facility] for the following day. He ordered a PET/CT that showed no evidence of disease outside of the pelvis. He essentially told her the same thing and recommended pelvic radiation therapy. Her referred her to Dr. Carlton Mendez with [facility] for treatment. Following those consultation she went back to see Dr. Ian Can at [facility], as well as Dr. Sylvia Casas at [facility], for additional consultation. She stated that she and Dr. Michael Mcclellan \"didn't mesh\". She presented to see me in January 2021 to ask my opinion on treatment and for my recommendations. I again explained to her that radiation therapy would be the standard of care. Surgical excision of the area might be an option, but still would not eliminate the need for radiation. I again explained that I didn't think chemotherapy, immunotherapy, or hormonal therapy would be good options, especially with radiation likely being a curative treatment. I was still not sure why she was so opposed to radiation and I again tried to alleviate her concerns regarding the treatment. I stressed that she needed to get started on therapy as soon as possible, as we had known about this recurrence since late September. I stressed that every day she delayed treatment she was decreasing her chances at salvage. She had another PET/CT in February at [facility] prior to her radiation, which was reportedly negative, though I don't have a copy of those results. She ultimately completed pelvic radiation therapy at [facility] with Dr. Ian Can, which she completed in April 2021.   She is still recovering from her radiation therapy and reports only some diarrhea and bladder irritation. ROS:   and GI review:  Negative  Cardiopulmonary review:  Negative   Musculoskeletal:  Negative    A comprehensive review of systems was negative except for that written in the History of Present Illness. , 10 point ROS      Problem List:  Patient Active Problem List    Diagnosis Date Noted    Endometrial cancer ([facility] 2019    Sinus bradycardia by electrocardiogram 2019    Pseudophakia of both eyes 2018    White coat syndrome without diagnosis of hypertension 2017    Living will, counseling/discussion 2017    Primary open angle glaucoma (POAG) 2016    Nuclear nonsenile cataract 2016    Vaccination refused by patient 10/06/2016    Colonoscopy refused 10/06/2016    Primary insomnia 10/06/2016    Glaucoma     Degenerative joint disease (DJD) of lumbar spine     GERD (gastroesophageal reflux disease)     Rheumatic fever     PUD (peptic ulcer disease)      PMH:  Past Medical History:   Diagnosis Date    BCC (basal cell carcinoma)     Cancer ([facility] 10/2019    UTERINE    Degenerative joint disease (DJD) of lumbar spine     GERD (gastroesophageal reflux disease)     Glaucoma     Headache     Nausea & vomiting     PUD (peptic ulcer disease)     \"MANY YEARS AGO\"    Rheumatic fever     AS CHILD    Sinus bradycardia by electrocardiogram 2019      PSH:  Past Surgical History:   Procedure Laterality Date    HX COLPOSCOPY      abn pap    HX HEENT      SHAMA CATARACTS    HX HIP REPLACEMENT      x2    HX HYSTERECTOMY  2019    Dr. Jose Duff    HX ORTHOPAEDIC      LEFT ELBOW FOR FX X2    HX OTHER SURGICAL      DOG BIT SURGERY ON RIGHT HAND    HX WISDOM TEETH EXTRACTION        Social History:  Social History     Tobacco Use    Smoking status: Former Smoker     Packs/day: 0.50     Years: 20.00     Pack years: 10.00     Quit date: 10/6/1978     Years since quittin.8  Smokeless tobacco: Never Used    Tobacco comment: SOMOKE OFF AND ON   Substance Use Topics    Alcohol use: Yes     Comment: RARELY      Family History:  Family History   Problem Relation Age of Onset    Cancer Mother         lung    Arthritis-osteo Mother     Heart Disease Father         pacer    Arthritis-osteo Father     Cancer Father         SKIN - basal cell    Pacemaker Father     Heart Surgery Father         STENT    Colon Polyps Father         no firm diagnosis of any cancer    Diabetes Sister     Other Sister         \"SLIGHT MENTAL RETARDATION\" - aphasic    Colon Polyps Sister         hx of polyps, uncertain of cancer    Other Sister         \"GUILLIAN BARRE\"    Breast Cancer Sister 76    Cancer Sister 58        endometrial    No Known Problems Daughter         born imperforate anus    Anesth Problems Neg Hx       Medications: (reviewed)  Current Outpatient Medications   Medication Sig    triamcinolone acetonide (KENALOG) 0.1 % topical cream Apply  to affected area two (2) times a day. use thin layer    latanoprost (XALATAN) 0.005 % ophthalmic solution Administer 1 Drop to both eyes nightly.  calcium carbonate (TUMS) 200 mg calcium (500 mg) chew Take 1 Tab by mouth daily.  psyllium husk (METAMUCIL PO) Take 1 Tab by mouth nightly.  vit A/vit C/vit E/zinc/copper (PRESERVISION AREDS PO) Take 1 Tab by mouth every fourty-eight (48) hours.  naproxen sodium (ALEVE) 220 mg cap Take  by mouth daily as needed.  SF 5000 PLUS 1.1 % crea TOOTHPASTE    timolol (TIMOPTIC-XE) 0.25 % ophthalmic gel-forming Administer 1 Drop to both eyes daily.  Omega-3 Fatty Acids 300 mg cap Take 3 Tabs by mouth daily.  multivitamin (ONE A DAY) tablet Take 1 Tab by mouth daily. OR AREDS    cholecalciferol (VITAMIN D3) 1,000 unit tablet Take 1,000 Units by mouth daily. No current facility-administered medications for this visit.      Allergies: (reviewed)  Allergies   Allergen Reactions    Shellfish Containing Products Diarrhea    Aspirin Other (comments)     heartburn    Penicillins Rash     Had PCN many years ago and had a rash. Has had it once since then with no reaction. OBJECTIVE:    Physical Exam:  VITAL SIGNS: Vitals:    07/20/21 1017   BP: (!) 156/86   Pulse: 65   Weight: 161 lb (73 kg)   Height: 5' 7.01\" (1.702 m)     Body mass index is 25.21 kg/m². GENERAL SHAMEKA: WD, WN, female in NAD   HEENT: WNL   RESPIRATORY: CTA   CARDIOVASC: RRR   GASTROINT: Soft, NT, ND   MUSCULOSKEL: WNL   EXTREMITIES: No edema   PELVIC: Normal external female genitalia. Normal vagina with radiation changes present. No lesions. No masses on bimanual exam.   RECTAL: Deferred   EDEL SURVEY: Negative    NEURO: Grossly intact       Lab Data:    Lab Results   Component Value Date/Time    WBC 12.7 (H) 11/28/2019 04:16 AM    HGB 11.3 (L) 11/28/2019 04:16 AM    HCT 34.7 (L) 11/28/2019 04:16 AM    PLATELET 099 85/77/3899 04:16 AM    MCV 94.3 11/28/2019 04:16 AM     Lab Results   Component Value Date/Time    Sodium 134 (L) 11/28/2019 04:16 AM    Potassium 3.8 11/28/2019 04:16 AM    Chloride 102 11/28/2019 04:16 AM    CO2 27 11/28/2019 04:16 AM    Anion gap 5 11/28/2019 04:16 AM    Glucose 154 (H) 11/28/2019 04:16 AM    BUN 14 11/28/2019 04:16 AM    Creatinine 1.05 (H) 11/28/2019 04:16 AM    BUN/Creatinine ratio 13 11/28/2019 04:16 AM    GFR est AA >60 11/28/2019 04:16 AM    GFR est non-AA 52 (L) 11/28/2019 04:16 AM    Calcium 8.4 (L) 11/28/2019 04:16 AM         CT of chest/abdomen/pelvis (10/3/20)  Chest:  Lungs: There is a calcified granuloma within the right lower lobe. Lungs are  otherwise clear.     Lymph nodes: There is no mediastinal, hilar or axillary lymphadenopathy. There  are several calcified mediastinal and right hilar lymph nodes.     Pleura: There is no prior study for direct comparison.     Heart:  The heart is normal in size and there is no pericardial fluid.     Bones: No lytic or sclerotic osseous lesion is visualized.     Abdomen/pelvis:  Liver: There is diffuse fatty infiltration of the liver. There is no suspicious  focal hepatic lesion.     Spleen: The spleen is normal.     Pancreas: The pancreas is normal.     Adrenals: The adrenals are normal.     Gallbladder: There is a gallstone in the gallbladder; there is no CT evidence of  acute cholecystitis.      Kidneys: There is a 2 mm non-obstructing left renal calculus. There are 2  subcentimeter left renal hypodensities, too small to further characterize, but  statistically likely to represent cysts. Note is made of a left retroaortic  renal vein.     Lymph nodes\omentum\peritoneum. There is no marisa hepatis, mesenteric,  retroperitoneal or pelvic lymphadenopathy. No intra-abdominal soft tissue nodule  or mass is seen.     Bowel: No dilated or thickened loop of large or small bowel is seen.     Urinary bladder: Urinary bladder is partially filled and grossly normal.     Bones: There is beam hardening artifact in the pelvis related to bilateral hip  prostheses. No lytic or sclerotic osseous lesion is visualized.     Miscellaneous: There is no free intraperitoneal gas or fluid. There is no focal  fluid collection to suggest abscess. Uterus is absent. The left and right  ovaries are not visualized, however there are two adjacent cysts within the left  hemipelvis, the larger of the two measures 2.3 cm x 2.2 cm. IMPRESSION: No evidence of metastatic disease within the chest, abdomen or  pelvis. Two adjacent cysts within the left hemipelvis, arising from the left  ovary. IMPRESSION/PLAN:  Liliya Mayer is a 66 y.o. female with a diagnosis of stage IA, grade 3, endometrial carcinoma. Following her RATLH, BSO, SPLND, she received vaginal brachytherapy at [facility]. She subsequently developed an isolated vaginal cuff recurrence in September 2020. She was recommended pelvic radiation therapy by myself and multiple other consulting physicians.   She ultimately completed whole pelvic radiation therapy at [facility]. She has no evidence of disease on today's exam and appears to have tolerated radiation well. She wishes to have her surveillance follow-up here with me. I will plan on seeing her back in 3 months. We will repeat imaging in 3 months. An electronic signature was used to sign this note.     Sheilla Ormond, MD  07/20/21

## 2021-07-20 NOTE — PROGRESS NOTES
Three month check up. Pt states no abnormal spotting, bleeding or pain. 1. Have you been to the ER, urgent care clinic since your last visit? Hospitalized since your last visit? No    2. Have you seen or consulted any other health care providers outside of the [address] [place] [address] since your last visit? Include any pap smears or colon screening.  No

## 2021-08-31 ENCOUNTER — VIRTUAL VISIT (OUTPATIENT)
Dept: GYNECOLOGY | Age: 71
End: 2021-08-31
Payer: MEDICARE

## 2021-08-31 DIAGNOSIS — C54.1 ENDOMETRIAL CANCER (HCC): Primary | ICD-10-CM

## 2021-08-31 PROCEDURE — 3017F COLORECTAL CA SCREEN DOC REV: CPT | Performed by: OBSTETRICS & GYNECOLOGY

## 2021-08-31 PROCEDURE — G9899 SCRN MAM PERF RSLTS DOC: HCPCS | Performed by: OBSTETRICS & GYNECOLOGY

## 2021-08-31 PROCEDURE — G8399 PT W/DXA RESULTS DOCUMENT: HCPCS | Performed by: OBSTETRICS & GYNECOLOGY

## 2021-08-31 PROCEDURE — 1090F PRES/ABSN URINE INCON ASSESS: CPT | Performed by: OBSTETRICS & GYNECOLOGY

## 2021-08-31 PROCEDURE — G8427 DOCREV CUR MEDS BY ELIG CLIN: HCPCS | Performed by: OBSTETRICS & GYNECOLOGY

## 2021-08-31 PROCEDURE — G8432 DEP SCR NOT DOC, RNG: HCPCS | Performed by: OBSTETRICS & GYNECOLOGY

## 2021-08-31 PROCEDURE — 99213 OFFICE O/P EST LOW 20 MIN: CPT | Performed by: OBSTETRICS & GYNECOLOGY

## 2021-08-31 PROCEDURE — G0463 HOSPITAL OUTPT CLINIC VISIT: HCPCS | Performed by: OBSTETRICS & GYNECOLOGY

## 2021-08-31 PROCEDURE — 1101F PT FALLS ASSESS-DOCD LE1/YR: CPT | Performed by: OBSTETRICS & GYNECOLOGY

## 2021-08-31 NOTE — PROGRESS NOTES
27 [facility] Mathias Moritz [address], [address]  P [phone]  F [phone]    Office Note  Patient ID:  Name:  Donald Taylor  MRN:  [id number]  :  1950/71 y.o. Date:  2021      HISTORY OF PRESENT ILLNESS:  Donald Taylor is a 66 y.o.  postmenopausal female who is an established patient with a diagnosis of stage IA, grade 3 endometrial cancer. She underwent a robotic assisted TLH, BSO, SPLND in 2019. She had minimal invasion, no LVSI, and negative washings. I did not recommend any adjuvant therapy, though I did discuss her risks of recurrence. She underwent genetic testing and had a VUS of the SMAD4 gene, while negative for any other mutations. She did have a second opinion at [facility] with Dr. Serge Bryan, and ultimately underwent vaginal cuff brachytherapy there, which she completed in 2020. She presented in 2020 for follow-up. She stated that she was enrolled in a clinical trial at [facility] on the effects of the use of vaginal dilators. She was last seen there a few months prior. She was without complaints. She denied any vaginal bleeding outside of when she uses the dilator, but that is not every time. She denied pelvic or abdominal pain. On pelvic exam I noted a small friable nodular area on the left anterolateral vagina measuring about 1.5 cm. I performed a biopsy of this suspicious lesion. The mass was almost removed completely with the biopsy. FINAL PATHOLOGIC DIAGNOSIS   Left upper vaginal wall, biopsy:   Metastatic adenocarcinoma, compatible with endometrial primary (see comment)   Comment   Immunochemical stains are performed. Tumor cells are positive for ER, DE, and p16 (patchy) while negative for CEA and Napsin A. Given the patient's history, morphologic findings, and immunohistochemical staining pattern the above diagnosis is rendered.      I ordered a CT of the chest/abdomen/pelvis to evaluate. It did not demonstrate any other disease. I recommended radiation therapy and suggested that she follow-up at [facility] with her radiation oncologist there, Dr. Jocelyne Harrison. She presented subsequently to discuss treatment options, as she was not enthusiastic about pelvic radiation therapy. She also stated she had another opinion scheduled with Dr. Deepak Chavarria at [facility] for the following day. He ordered a PET/CT that showed no evidence of disease outside of the pelvis. He essentially told her the same thing and recommended pelvic radiation therapy. Her referred her to Dr. Yolette Flanagan with [facility] for treatment. Following those consultation she went back to see Dr. Lasha Stinson at [facility], as well as Dr. Jodi Johnson at [facility], for additional consultation. She stated that she and Dr. Kandy Deutsch \"didn't mesh\". She presented to see me in January 2021 to ask my opinion on treatment and for my recommendations. I again explained to her that radiation therapy would be the standard of care. Surgical excision of the area might be an option, but still would not eliminate the need for radiation. I again explained that I didn't think chemotherapy, immunotherapy, or hormonal therapy would be good options, especially with radiation likely being a curative treatment. I was still not sure why she was so opposed to radiation and I again tried to alleviate her concerns regarding the treatment. I stressed that she needed to get started on therapy as soon as possible, as we had known about this recurrence since late September. I stressed that every day she delayed treatment she was decreasing her chances at salvage. She had another PET/CT in February 2021 at [facility] prior to her radiation, which was reportedly negative, though I don't have a copy of those results. She ultimately completed pelvic radiation therapy at [facility] with Dr. Lasha Stinson, which she completed in April 2021.       She presents today to review her latest CT from [facility], performed on 8/9/21. There was no evidence of recurrent or metastatic disease. She is still recovering from her radiation therapy and reports only some diarrhea and bladder irritation. ROS:   and GI review:  Negative  Cardiopulmonary review:  Negative   Musculoskeletal:  Negative    A comprehensive review of systems was negative except for that written in the History of Present Illness. , 10 point ROS      Problem List:  Patient Active Problem List    Diagnosis Date Noted    Endometrial cancer ([facility] 11/27/2019    Sinus bradycardia by electrocardiogram 11/21/2019    Pseudophakia of both eyes 05/29/2018    White coat syndrome without diagnosis of hypertension 08/07/2017    Living will, counseling/discussion 08/07/2017    Primary open angle glaucoma (POAG) 12/19/2016    Nuclear nonsenile cataract 12/19/2016    Vaccination refused by patient 10/06/2016    Colonoscopy refused 10/06/2016    Primary insomnia 10/06/2016    Glaucoma     Degenerative joint disease (DJD) of lumbar spine     GERD (gastroesophageal reflux disease)     Rheumatic fever     PUD (peptic ulcer disease)      PMH:  Past Medical History:   Diagnosis Date    BCC (basal cell carcinoma)     Cancer ([facility] 10/2019    UTERINE    Degenerative joint disease (DJD) of lumbar spine     GERD (gastroesophageal reflux disease)     Glaucoma     Headache     Nausea & vomiting     PUD (peptic ulcer disease)     \"MANY YEARS AGO\"    Rheumatic fever     AS CHILD    Sinus bradycardia by electrocardiogram 11/21/2019      PSH:  Past Surgical History:   Procedure Laterality Date    HX COLPOSCOPY      abn pap    HX HEENT      SHAMA CATARACTS    HX HIP REPLACEMENT      x2    HX HYSTERECTOMY  11/27/2019    Dr. Mamie Begum HX ORTHOPAEDIC      LEFT ELBOW FOR FX X2    HX OTHER SURGICAL      DOG BIT SURGERY ON RIGHT HAND    HX WISDOM TEETH EXTRACTION        Social History:  Social History     Tobacco Use    Smoking status: Former Smoker     Packs/day: 0.50     Years: 20.00     Pack years: 10.00     Quit date: 10/6/1978     Years since quittin.9    Smokeless tobacco: Never Used    Tobacco comment: SOMOKE OFF AND ON   Substance Use Topics    Alcohol use: Yes     Comment: RARELY      Family History:  Family History   Problem Relation Age of Onset    Cancer Mother         lung    Arthritis-osteo Mother     Heart Disease Father         pacer    Arthritis-osteo Father     Cancer Father         SKIN - basal cell    Pacemaker Father     Heart Surgery Father         STENT    Colon Polyps Father         no firm diagnosis of any cancer    Diabetes Sister     Other Sister         \"SLIGHT MENTAL RETARDATION\" - aphasic    Colon Polyps Sister         hx of polyps, uncertain of cancer    Other Sister         \"GUILLIAN BARRE\"    Breast Cancer Sister 76    Cancer Sister 58        endometrial    No Known Problems Daughter         born imperforate anus    Anesth Problems Neg Hx       Medications: (reviewed)  Current Outpatient Medications   Medication Sig    triamcinolone acetonide (KENALOG) 0.1 % topical cream Apply  to affected area two (2) times a day. use thin layer    latanoprost (XALATAN) 0.005 % ophthalmic solution Administer 1 Drop to both eyes nightly.  calcium carbonate (TUMS) 200 mg calcium (500 mg) chew Take 1 Tab by mouth daily.  psyllium husk (METAMUCIL PO) Take 1 Tab by mouth nightly.  vit A/vit C/vit E/zinc/copper (PRESERVISION AREDS PO) Take 1 Tab by mouth every fourty-eight (48) hours.  naproxen sodium (ALEVE) 220 mg cap Take  by mouth daily as needed.  SF 5000 PLUS 1.1 % crea TOOTHPASTE    timolol (TIMOPTIC-XE) 0.25 % ophthalmic gel-forming Administer 1 Drop to both eyes daily.  Omega-3 Fatty Acids 300 mg cap Take 3 Tabs by mouth daily.  multivitamin (ONE A DAY) tablet Take 1 Tab by mouth daily.  OR AREDS    cholecalciferol (VITAMIN D3) 1,000 unit tablet Take 1,000 Units by mouth daily. No current facility-administered medications for this visit. Allergies: (reviewed)  Allergies   Allergen Reactions    Shellfish Containing Products Diarrhea    Aspirin Other (comments)     heartburn    Penicillins Rash     Had PCN many years ago and had a rash. Has had it once since then with no reaction. OBJECTIVE:    Physical Exam:  VITAL SIGNS: There were no vitals filed for this visit. There is no height or weight on file to calculate BMI. GENERAL SHAMEKA:    HEENT:    RESPIRATORY:    CARDIOVASC:    GASTROINT:    MUSCULOSKEL:    EXTREMITIES:    PELVIC:    RECTAL:    EDEL SURVEY:    NEURO:        Lab Data:    Lab Results   Component Value Date/Time    WBC 12.7 (H) 11/28/2019 04:16 AM    HGB 11.3 (L) 11/28/2019 04:16 AM    HCT 34.7 (L) 11/28/2019 04:16 AM    PLATELET 001 36/23/2632 04:16 AM    MCV 94.3 11/28/2019 04:16 AM     Lab Results   Component Value Date/Time    Sodium 134 (L) 11/28/2019 04:16 AM    Potassium 3.8 11/28/2019 04:16 AM    Chloride 102 11/28/2019 04:16 AM    CO2 27 11/28/2019 04:16 AM    Anion gap 5 11/28/2019 04:16 AM    Glucose 154 (H) 11/28/2019 04:16 AM    BUN 14 11/28/2019 04:16 AM    Creatinine 1.05 (H) 11/28/2019 04:16 AM    BUN/Creatinine ratio 13 11/28/2019 04:16 AM    GFR est AA >60 11/28/2019 04:16 AM    GFR est non-AA 52 (L) 11/28/2019 04:16 AM    Calcium 8.4 (L) 11/28/2019 04:16 AM         CT of chest/abdomen/pelvis (10/3/20)  Chest:  Lungs: There is a calcified granuloma within the right lower lobe. Lungs are  otherwise clear.     Lymph nodes: There is no mediastinal, hilar or axillary lymphadenopathy. There  are several calcified mediastinal and right hilar lymph nodes.     Pleura: There is no prior study for direct comparison.     Heart: The heart is normal in size and there is no pericardial fluid.     Bones: No lytic or sclerotic osseous lesion is visualized.     Abdomen/pelvis:  Liver: There is diffuse fatty infiltration of the liver.  There is no suspicious  focal hepatic lesion.     Spleen: The spleen is normal.     Pancreas: The pancreas is normal.     Adrenals: The adrenals are normal.     Gallbladder: There is a gallstone in the gallbladder; there is no CT evidence of  acute cholecystitis.      Kidneys: There is a 2 mm non-obstructing left renal calculus. There are 2  subcentimeter left renal hypodensities, too small to further characterize, but  statistically likely to represent cysts. Note is made of a left retroaortic  renal vein.     Lymph nodes\omentum\peritoneum. There is no marisa hepatis, mesenteric,  retroperitoneal or pelvic lymphadenopathy. No intra-abdominal soft tissue nodule  or mass is seen.     Bowel: No dilated or thickened loop of large or small bowel is seen.     Urinary bladder: Urinary bladder is partially filled and grossly normal.     Bones: There is beam hardening artifact in the pelvis related to bilateral hip  prostheses. No lytic or sclerotic osseous lesion is visualized.     Miscellaneous: There is no free intraperitoneal gas or fluid. There is no focal  fluid collection to suggest abscess. Uterus is absent. The left and right  ovaries are not visualized, however there are two adjacent cysts within the left  hemipelvis, the larger of the two measures 2.3 cm x 2.2 cm. IMPRESSION: No evidence of metastatic disease within the chest, abdomen or  pelvis. Two adjacent cysts within the left hemipelvis, arising from the left  ovary. CT of abdomen/pelvis (8/9/21) - [facility]  No evidence of recurrent/metastatic disease      IMPRESSION/PLAN:  Elton Aceves is a 66 y.o. female with a diagnosis of stage IA, grade 3, endometrial carcinoma. Following her RATLH, BSO, SPLND, she received vaginal brachytherapy at [facility]. She subsequently developed an isolated vaginal cuff recurrence in September 2020. She was recommended pelvic radiation therapy by myself and multiple other consulting physicians.   She ultimately completed whole pelvic radiation therapy at [facility]. She has no evidence of disease on today's exam and appears to have tolerated radiation well. She has no evidence of disease on her latest CT done at [facility]. I will plan on seeing her back in 3 months. Donald Taylor is a 66 y.o. female who was seen by synchronous (real-time) audio-video technology on 8/31/2021 for Results (Virtual visit to review ct scan results)        Assessment & Plan:   Diagnoses and all orders for this visit:    1. Endometrial cancer ([facility]        I spent at least 15 minutes on this visit with this established patient. Subjective:       Prior to Admission medications    Medication Sig Start Date End Date Taking? Authorizing Provider   triamcinolone acetonide (KENALOG) 0.1 % topical cream Apply  to affected area two (2) times a day. use thin layer   Yes Provider, Historical   latanoprost (XALATAN) 0.005 % ophthalmic solution Administer 1 Drop to both eyes nightly. Yes Provider, Historical   calcium carbonate (TUMS) 200 mg calcium (500 mg) chew Take 1 Tab by mouth daily. Yes Provider, Historical   psyllium husk (METAMUCIL PO) Take 1 Tab by mouth nightly. Yes Provider, Historical   vit A/vit C/vit E/zinc/copper (PRESERVISION AREDS PO) Take 1 Tab by mouth every fourty-eight (48) hours. Yes Provider, Historical   naproxen sodium (ALEVE) 220 mg cap Take  by mouth daily as needed. Yes Provider, Historical   SF 5000 PLUS 1.1 % crea TOOTHPASTE 9/18/19  Yes Provider, Historical   timolol (TIMOPTIC-XE) 0.25 % ophthalmic gel-forming Administer 1 Drop to both eyes daily. 9/21/16  Yes Provider, Historical   Omega-3 Fatty Acids 300 mg cap Take 3 Tabs by mouth daily. Yes Provider, Historical   multivitamin (ONE A DAY) tablet Take 1 Tab by mouth daily. OR AREDS   Yes Provider, Historical   cholecalciferol (VITAMIN D3) 1,000 unit tablet Take 1,000 Units by mouth daily.    Yes Provider, Historical     Patient Active Problem List   Diagnosis Code    Glaucoma H40.9    Degenerative joint disease (DJD) of lumbar spine M47.816    GERD (gastroesophageal reflux disease) K21.9    Rheumatic fever I00    PUD (peptic ulcer disease) K27.9    Vaccination refused by patient Z28.21    Colonoscopy refused Z53.20    Primary insomnia F51.01    White coat syndrome without diagnosis of hypertension R03.0    Living will, counseling/discussion Z71.89    Sinus bradycardia by electrocardiogram R00.1    Endometrial cancer (HCC) C54.1    Primary open angle glaucoma (POAG) H40.1190    Nuclear 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    Pseudophakia of both eyes Z96.1     Patient Active Problem List    Diagnosis Date Noted    Endometrial cancer ([facility] 11/27/2019    Sinus bradycardia by electrocardiogram 11/21/2019    Pseudophakia of both eyes 05/29/2018    White coat syndrome without diagnosis of hypertension 08/07/2017    Living will, counseling/discussion 08/07/2017    Primary open angle glaucoma (POAG) 12/19/2016    Nuclear nonsenile cataract 12/19/2016    Vaccination refused by patient 10/06/2016    Colonoscopy refused 10/06/2016    Primary insomnia 10/06/2016    Glaucoma     Degenerative joint disease (DJD) of lumbar spine     GERD (gastroesophageal reflux disease)     Rheumatic fever     PUD (peptic ulcer disease)      Current Outpatient Medications   Medication Sig Dispense Refill    triamcinolone acetonide (KENALOG) 0.1 % topical cream Apply  to affected area two (2) times a day. use thin layer      latanoprost (XALATAN) 0.005 % ophthalmic solution Administer 1 Drop to both eyes nightly.  calcium carbonate (TUMS) 200 mg calcium (500 mg) chew Take 1 Tab by mouth daily.  psyllium husk (METAMUCIL PO) Take 1 Tab by mouth nightly.  vit A/vit C/vit E/zinc/copper (PRESERVISION AREDS PO) Take 1 Tab by mouth every fourty-eight (48) hours.  naproxen sodium (ALEVE) 220 mg cap Take  by mouth daily as needed.       SF 5000 PLUS 1.1 % crea TOOTHPASTE  4    timolol (TIMOPTIC-XE) 0.25 % ophthalmic gel-forming Administer 1 Drop to both eyes daily. 0    Omega-3 Fatty Acids 300 mg cap Take 3 Tabs by mouth daily.  multivitamin (ONE A DAY) tablet Take 1 Tab by mouth daily. OR AREDS      cholecalciferol (VITAMIN D3) 1,000 unit tablet Take 1,000 Units by mouth daily. Allergies   Allergen Reactions    Shellfish Containing Products Diarrhea    Aspirin Other (comments)     heartburn    Penicillins Rash     Had PCN many years ago and had a rash. Has had it once since then with no reaction.      Past Medical History:   Diagnosis Date    BCC (basal cell carcinoma)     Cancer (HCC) 10/2019    UTERINE    Degenerative joint disease (DJD) of lumbar spine     GERD (gastroesophageal reflux disease)     Glaucoma     Headache     Nausea & vomiting     PUD (peptic ulcer disease)     \"MANY YEARS AGO\"    Rheumatic fever     AS CHILD    Sinus bradycardia by electrocardiogram 11/21/2019     Past Surgical History:   Procedure Laterality Date    HX COLPOSCOPY      abn pap    HX HEENT      SHAMA CATARACTS    HX HIP REPLACEMENT      x2    HX HYSTERECTOMY  11/27/2019    Dr. Tr Reed HX ORTHOPAEDIC      SHAMA HIP REPLACEMENT    HX ORTHOPAEDIC      LEFT ELBOW FOR FX X2    HX OTHER SURGICAL      DOG BIT SURGERY ON RIGHT HAND    HX WISDOM TEETH EXTRACTION       Family History   Problem Relation Age of Onset    Cancer Mother         lung    Arthritis-osteo Mother     Heart Disease Father         pacer    Arthritis-osteo Father     Cancer Father         SKIN - basal cell    Pacemaker Father     Heart Surgery Father         STENT    Colon Polyps Father         no firm diagnosis of any cancer    Diabetes Sister     Other Sister         \"SLIGHT MENTAL RETARDATION\" - aphasic    Colon Polyps Sister         hx of polyps, uncertain of cancer    Other Sister         \"GUILLIAN BARRE\"    Breast Cancer Sister 76    Cancer Sister 58        endometrial    No Known Problems Daughter         born imperforate anus    Anesth Problems Neg Hx      Social History     Tobacco Use    Smoking status: Former Smoker     Packs/day: 0.50     Years: 20.00     Pack years: 10.00     Quit date: 10/6/1978     Years since quittin.9    Smokeless tobacco: Never Used    Tobacco comment: SOMOKE OFF AND ON   Substance Use Topics    Alcohol use: Yes     Comment: RARELY       ROS    Objective:   No flowsheet data found. General: alert, cooperative, no distress   Mental  status: normal mood, behavior, speech, dress, motor activity, and thought processes, able to follow commands   HENT: NCAT   Neck: no visualized mass   Resp: no respiratory distress   Neuro: no gross deficits   Skin: no discoloration or lesions of concern on visible areas   Psychiatric: normal affect, consistent with stated mood, no evidence of hallucinations     Additional exam findings: We discussed the expected course, resolution and complications of the diagnosis(es) in detail. Medication risks, benefits, costs, interactions, and alternatives were discussed as indicated. I advised her to contact the office if her condition worsens, changes or fails to improve as anticipated. She expressed understanding with the diagnosis(es) and plan. Grayce Kawasaki, who was evaluated through a patient-initiated, synchronous (real-time) audio-video encounter, and/or her healthcare decision maker, is aware that it is a billable service, with coverage as determined by her insurance carrier. She provided verbal consent to proceed: Yes, and patient identification was verified. It was conducted pursuant to the emergency declaration under the [address] [place] [address], [address] [place] [address] and the [employer] and [employer] Act. A caregiver was present when appropriate. Ability to conduct physical exam was limited. I was in the office.  The patient was at home.      An electronic signature was used to sign this note.     Jorge Hudson MD  08/31/21

## 2021-10-25 NOTE — PROGRESS NOTES
Check up for history of endometrial cancer. Pt states no abnormal spotting, bleeding or pain. 1. Have you been to the ER, urgent care clinic since your last visit? Hospitalized since your last visit?no    2. Have you seen or consulted any other health care providers outside of the [address] [place] [address] since your last visit? Include any pap smears or colon screening.  no

## 2021-10-26 ENCOUNTER — OFFICE VISIT (OUTPATIENT)
Dept: GYNECOLOGY | Age: 71
End: 2021-10-26
Payer: MEDICARE

## 2021-10-26 VITALS
BODY MASS INDEX: 24.8 KG/M2 | DIASTOLIC BLOOD PRESSURE: 72 MMHG | HEART RATE: 60 BPM | HEIGHT: 67 IN | SYSTOLIC BLOOD PRESSURE: 157 MMHG | WEIGHT: 158 LBS

## 2021-10-26 DIAGNOSIS — C54.1 ENDOMETRIAL CANCER (HCC): Primary | ICD-10-CM

## 2021-10-26 PROCEDURE — G8427 DOCREV CUR MEDS BY ELIG CLIN: HCPCS | Performed by: OBSTETRICS & GYNECOLOGY

## 2021-10-26 PROCEDURE — G8432 DEP SCR NOT DOC, RNG: HCPCS | Performed by: OBSTETRICS & GYNECOLOGY

## 2021-10-26 PROCEDURE — 3017F COLORECTAL CA SCREEN DOC REV: CPT | Performed by: OBSTETRICS & GYNECOLOGY

## 2021-10-26 PROCEDURE — G9899 SCRN MAM PERF RSLTS DOC: HCPCS | Performed by: OBSTETRICS & GYNECOLOGY

## 2021-10-26 PROCEDURE — G8536 NO DOC ELDER MAL SCRN: HCPCS | Performed by: OBSTETRICS & GYNECOLOGY

## 2021-10-26 PROCEDURE — G8399 PT W/DXA RESULTS DOCUMENT: HCPCS | Performed by: OBSTETRICS & GYNECOLOGY

## 2021-10-26 PROCEDURE — G0463 HOSPITAL OUTPT CLINIC VISIT: HCPCS | Performed by: OBSTETRICS & GYNECOLOGY

## 2021-10-26 PROCEDURE — 1090F PRES/ABSN URINE INCON ASSESS: CPT | Performed by: OBSTETRICS & GYNECOLOGY

## 2021-10-26 PROCEDURE — 1101F PT FALLS ASSESS-DOCD LE1/YR: CPT | Performed by: OBSTETRICS & GYNECOLOGY

## 2021-10-26 PROCEDURE — G8420 CALC BMI NORM PARAMETERS: HCPCS | Performed by: OBSTETRICS & GYNECOLOGY

## 2021-10-26 PROCEDURE — 99213 OFFICE O/P EST LOW 20 MIN: CPT | Performed by: OBSTETRICS & GYNECOLOGY

## 2021-10-26 NOTE — PROGRESS NOTES
[facility] [address]  P [phone]  F [phone]    Office Note  Patient ID:  Name:  Nargis Chris  MRN:  [id number]  :  1950/71 y.o. Date:  10/26/2021      HISTORY OF PRESENT ILLNESS:  Nargis Chris is a 66 y.o.  postmenopausal female who is an established patient with a diagnosis of stage IA, grade 3 endometrial cancer. She underwent a robotic assisted TLH, BSO, SPLND in 2019. She had minimal invasion, no LVSI, and negative washings. I did not recommend any adjuvant therapy, though I did discuss her risks of recurrence. She underwent genetic testing and had a VUS of the SMAD4 gene, while negative for any other mutations. She did have a second opinion at [facility] with Dr. Stacey Padilla, and ultimately underwent vaginal cuff brachytherapy there, which she completed in 2020. She presented in 2020 for follow-up. She stated that she was enrolled in a clinical trial at [facility] on the effects of the use of vaginal dilators. She was last seen there a few months prior. She was without complaints. She denied any vaginal bleeding outside of when she uses the dilator, but that is not every time. She denied pelvic or abdominal pain. On pelvic exam I noted a small friable nodular area on the left anterolateral vagina measuring about 1.5 cm. I performed a biopsy of this suspicious lesion. The mass was almost removed completely with the biopsy. FINAL PATHOLOGIC DIAGNOSIS   Left upper vaginal wall, biopsy:   Metastatic adenocarcinoma, compatible with endometrial primary (see comment)   Comment   Immunochemical stains are performed. Tumor cells are positive for ER, GA, and p16 (patchy) while negative for CEA and Napsin A. Given the patient's history, morphologic findings, and immunohistochemical staining pattern the above diagnosis is rendered.      I ordered a CT of the chest/abdomen/pelvis to evaluate. It did not demonstrate any other disease. I recommended radiation therapy and suggested that she follow-up at [facility] with her radiation oncologist there, Dr. Migel Vieira. She presented subsequently to discuss treatment options, as she was not enthusiastic about pelvic radiation therapy. She also stated she had another opinion scheduled with Dr. Tete Sams at [facility] for the following day. He ordered a PET/CT that showed no evidence of disease outside of the pelvis. He essentially told her the same thing and recommended pelvic radiation therapy. Her referred her to Dr. Bethel Ruiz with [facility] for treatment. Following those consultation she went back to see Dr. Tanika Butts at [facility], as well as Dr. Tobin Jones at [facility], for additional consultation. She stated that she and Dr. Jim Bui \"didn't mesh\". She presented to see me in January 2021 to ask my opinion on treatment and for my recommendations. I again explained to her that radiation therapy would be the standard of care. Surgical excision of the area might be an option, but still would not eliminate the need for radiation. I again explained that I didn't think chemotherapy, immunotherapy, or hormonal therapy would be good options, especially with radiation likely being a curative treatment. I was still not sure why she was so opposed to radiation and I again tried to alleviate her concerns regarding the treatment. I stressed that she needed to get started on therapy as soon as possible, as we had known about this recurrence since late September. I stressed that every day she delayed treatment she was decreasing her chances at salvage. She had another PET/CT in February 2021 at [facility] prior to her radiation, which was reportedly negative, though I don't have a copy of those results. She ultimately completed pelvic radiation therapy at [facility] with Dr. Tanika Butts, which she completed in April 2021. She presents today for surveillance.   She was last seen in the office in August to review her CT from [facility], performed on 8/9/21. There was no evidence of recurrent or metastatic disease. ROS:   and GI review:  Negative  Cardiopulmonary review:  Negative   Musculoskeletal:  Negative    A comprehensive review of systems was negative except for that written in the History of Present Illness. , 10 point ROS      Problem List:  Patient Active Problem List    Diagnosis Date Noted    Endometrial cancer ([facility] [address] 11/27/2019    Sinus bradycardia by electrocardiogram 11/21/2019    Pseudophakia of both eyes 05/29/2018    White coat syndrome without diagnosis of hypertension 08/07/2017    Living will, counseling/discussion 08/07/2017    Primary open angle glaucoma (POAG) 12/19/2016    Nuclear nonsenile cataract 12/19/2016    Vaccination refused by patient 10/06/2016    Colonoscopy refused 10/06/2016    Primary insomnia 10/06/2016    Glaucoma     Degenerative joint disease (DJD) of lumbar spine     GERD (gastroesophageal reflux disease)     Rheumatic fever     PUD (peptic ulcer disease)      PMH:  Past Medical History:   Diagnosis Date    BCC (basal cell carcinoma)     [facility]    UTER[facility]    Degenerative joint disease (DJD) of lumbar spine     GERD (gastroesophageal reflux disease)     Glaucoma     Headache     Nausea & vomiting     PUD (peptic ulcer disease)     \"MANY YEARS AGO\"    Rheumatic fever     AS CHILD    Sinus bradycardia by electrocardiogram 11/21/2019      PSH:  Past Surgical History:   Procedure Laterality Date    HX COLPOSCOPY      abn pap    HX HEENT      SHAMA CATARACTS    HX HIP REPLACEMENT      x2    HX HYSTERECTOMY  11/27/2019    Dr. Rafa Adame HX ORTHOPAEDIC      LEFT ELBOW FOR FX X2    HX OTHER SURGICAL      DOG BIT SURGERY ON RIGHT HAND    HX WISDOM TEETH EXTRACTION        Social History:  Social History     Tobacco Use    Smoking status: Former Smoker     Packs/day: 0.50 Years: 20.00     Pack years: 10.00     Quit date: 10/6/1978     Years since quittin.0    Smokeless tobacco: Never Used    Tobacco comment: SOMOKE OFF AND ON   Substance Use Topics    Alcohol use: Yes     Comment: RARELY      Family History:  Family History   Problem Relation Age of Onset    Cancer Mother         lung    Arthritis-osteo Mother     Heart Disease Father         pacer    Arthritis-osteo Father     Cancer Father         SKIN - basal cell    Pacemaker Father     Heart Surgery Father         STENT    Colon Polyps Father         no firm diagnosis of any cancer    Diabetes Sister     Other Sister         \"SLIGHT MENTAL RETARDATION\" - aphasic    Colon Polyps Sister         hx of polyps, uncertain of cancer    Other Sister         \"GUILLIAN BARRE\"    Breast Cancer Sister 76    Cancer Sister 58        endometrial    No Known Problems Daughter         born imperforate anus    Anesth Problems Neg Hx       Medications: (reviewed)  Current Outpatient Medications   Medication Sig    triamcinolone acetonide (KENALOG) 0.1 % topical cream Apply  to affected area two (2) times a day. use thin layer    latanoprost (XALATAN) 0.005 % ophthalmic solution Administer 1 Drop to both eyes nightly.  calcium carbonate (TUMS) 200 mg calcium (500 mg) chew Take 1 Tab by mouth daily.  psyllium husk (METAMUCIL PO) Take 1 Tab by mouth nightly.  vit A/vit C/vit E/zinc/copper (PRESERVISION AREDS PO) Take 1 Tab by mouth every fourty-eight (48) hours.  naproxen sodium (ALEVE) 220 mg cap Take  by mouth daily as needed.  SF 5000 PLUS 1.1 % crea TOOTHPASTE    timolol (TIMOPTIC-XE) 0.25 % ophthalmic gel-forming Administer 1 Drop to both eyes daily.  Omega-3 Fatty Acids 300 mg cap Take 3 Tabs by mouth daily.  multivitamin (ONE A DAY) tablet Take 1 Tab by mouth daily. OR AREDS    cholecalciferol (VITAMIN D3) 1,000 unit tablet Take 1,000 Units by mouth daily.      No current facility-administered medications for this visit. Allergies: (reviewed)  Allergies   Allergen Reactions    Shellfish Containing Products Diarrhea    Aspirin Other (comments)     heartburn    Penicillins Rash     Had PCN many years ago and had a rash. Has had it once since then with no reaction. OBJECTIVE:    Physical Exam:  VITAL SIGNS: Vitals:    10/26/21 1149   BP: (!) 157/72   Pulse: 60   Weight: 158 lb (71.7 kg)   Height: 5' 7.01\" (1.702 m)     Body mass index is 24.74 kg/m². GENERAL SHAMEKA: WD, WN, WF in NAD   HEENT: WNL   RESPIRATORY: CTA   CARDIOVASC: RRR   GASTROINT: Soft, NT, ND   MUSCULOSKEL: WNL   EXTREMITIES: No edema   PELVIC: Normal external genitalia. Normal vagina. Uterus, cervix, and adnexa absent. No masses. No lesions. RECTAL: Deferred   EDEL SURVEY: Negative   NEURO: Grossly intact       Lab Data:    Lab Results   Component Value Date/Time    WBC 12.7 (H) 11/28/2019 04:16 AM    HGB 11.3 (L) 11/28/2019 04:16 AM    HCT 34.7 (L) 11/28/2019 04:16 AM    PLATELET 848 60/45/8495 04:16 AM    MCV 94.3 11/28/2019 04:16 AM     Lab Results   Component Value Date/Time    Sodium 134 (L) 11/28/2019 04:16 AM    Potassium 3.8 11/28/2019 04:16 AM    Chloride 102 11/28/2019 04:16 AM    CO2 27 11/28/2019 04:16 AM    Anion gap 5 11/28/2019 04:16 AM    Glucose 154 (H) 11/28/2019 04:16 AM    BUN 14 11/28/2019 04:16 AM    Creatinine 1.05 (H) 11/28/2019 04:16 AM    BUN/Creatinine ratio 13 11/28/2019 04:16 AM    GFR est AA >60 11/28/2019 04:16 AM    GFR est non-AA 52 (L) 11/28/2019 04:16 AM    Calcium 8.4 (L) 11/28/2019 04:16 AM         CT of chest/abdomen/pelvis (10/3/20)  Chest:  Lungs: There is a calcified granuloma within the right lower lobe. Lungs are  otherwise clear.     Lymph nodes: There is no mediastinal, hilar or axillary lymphadenopathy. There  are several calcified mediastinal and right hilar lymph nodes.     Pleura: There is no prior study for direct comparison.     Heart:  The heart is normal in size and there is no pericardial fluid.     Bones: No lytic or sclerotic osseous lesion is visualized.     Abdomen/pelvis:  Liver: There is diffuse fatty infiltration of the liver. There is no suspicious  focal hepatic lesion.     Spleen: The spleen is normal.     Pancreas: The pancreas is normal.     Adrenals: The adrenals are normal.     Gallbladder: There is a gallstone in the gallbladder; there is no CT evidence of  acute cholecystitis.      Kidneys: There is a 2 mm non-obstructing left renal calculus. There are 2  subcentimeter left renal hypodensities, too small to further characterize, but  statistically likely to represent cysts. Note is made of a left retroaortic  renal vein.     Lymph nodes\omentum\peritoneum. There is no marisa hepatis, mesenteric,  retroperitoneal or pelvic lymphadenopathy. No intra-abdominal soft tissue nodule  or mass is seen.     Bowel: No dilated or thickened loop of large or small bowel is seen.     Urinary bladder: Urinary bladder is partially filled and grossly normal.     Bones: There is beam hardening artifact in the pelvis related to bilateral hip  prostheses. No lytic or sclerotic osseous lesion is visualized.     Miscellaneous: There is no free intraperitoneal gas or fluid. There is no focal  fluid collection to suggest abscess. Uterus is absent. The left and right  ovaries are not visualized, however there are two adjacent cysts within the left  hemipelvis, the larger of the two measures 2.3 cm x 2.2 cm. IMPRESSION: No evidence of metastatic disease within the chest, abdomen or  pelvis. Two adjacent cysts within the left hemipelvis, arising from the left  ovary. CT of abdomen/pelvis (8/9/21) - [facility]  No evidence of recurrent/metastatic disease      IMPRESSION/PLAN:  Carlos Rosario is a 66 y.o. female with a diagnosis of stage IA, grade 3, endometrial carcinoma. Following her RATLH, BSO, SPLND, she received vaginal brachytherapy at [address] [place] [address].   She subsequently developed an isolated vaginal cuff recurrence in September 2020. She was recommended pelvic radiation therapy by myself and multiple other consulting physicians. She ultimately completed whole pelvic radiation therapy at [facility]. She has no evidence of disease on today's exam and appears to have tolerated radiation well. She has no evidence of disease on her last CT done at [facility]. I will plan on seeing her back in a few months. We will repeat a CT prior to that visit. An electronic signature was used to sign this note.     Marcial Wood MD  10/26/21

## 2021-12-14 ENCOUNTER — OFFICE VISIT (OUTPATIENT)
Dept: GYNECOLOGY | Age: 71
End: 2021-12-14
Payer: MEDICARE

## 2021-12-14 VITALS
HEIGHT: 67 IN | SYSTOLIC BLOOD PRESSURE: 156 MMHG | HEART RATE: 58 BPM | BODY MASS INDEX: 24.8 KG/M2 | WEIGHT: 158 LBS | DIASTOLIC BLOOD PRESSURE: 84 MMHG

## 2021-12-14 DIAGNOSIS — C54.1 ENDOMETRIAL CANCER (HCC): Primary | ICD-10-CM

## 2021-12-14 PROCEDURE — G8420 CALC BMI NORM PARAMETERS: HCPCS | Performed by: OBSTETRICS & GYNECOLOGY

## 2021-12-14 PROCEDURE — 1090F PRES/ABSN URINE INCON ASSESS: CPT | Performed by: OBSTETRICS & GYNECOLOGY

## 2021-12-14 PROCEDURE — G8432 DEP SCR NOT DOC, RNG: HCPCS | Performed by: OBSTETRICS & GYNECOLOGY

## 2021-12-14 PROCEDURE — G9899 SCRN MAM PERF RSLTS DOC: HCPCS | Performed by: OBSTETRICS & GYNECOLOGY

## 2021-12-14 PROCEDURE — G8427 DOCREV CUR MEDS BY ELIG CLIN: HCPCS | Performed by: OBSTETRICS & GYNECOLOGY

## 2021-12-14 PROCEDURE — 3017F COLORECTAL CA SCREEN DOC REV: CPT | Performed by: OBSTETRICS & GYNECOLOGY

## 2021-12-14 PROCEDURE — 99213 OFFICE O/P EST LOW 20 MIN: CPT | Performed by: OBSTETRICS & GYNECOLOGY

## 2021-12-14 PROCEDURE — G8536 NO DOC ELDER MAL SCRN: HCPCS | Performed by: OBSTETRICS & GYNECOLOGY

## 2021-12-14 PROCEDURE — G8399 PT W/DXA RESULTS DOCUMENT: HCPCS | Performed by: OBSTETRICS & GYNECOLOGY

## 2021-12-14 PROCEDURE — G0463 HOSPITAL OUTPT CLINIC VISIT: HCPCS | Performed by: OBSTETRICS & GYNECOLOGY

## 2021-12-14 PROCEDURE — 1101F PT FALLS ASSESS-DOCD LE1/YR: CPT | Performed by: OBSTETRICS & GYNECOLOGY

## 2021-12-14 NOTE — PROGRESS NOTES
[address] [place] [address] [address], [address]  P [phone]  F [phone]    Office Note  Patient ID:  Name:  Caden Hunter  MRN:  [id number]  :  1950/71 y.o. Date:  2021      HISTORY OF PRESENT ILLNESS:  Caden Hunter is a 66 y.o.  postmenopausal female who is an established patient with a diagnosis of stage IA, grade 3 endometrial cancer. She underwent a robotic assisted TLH, BSO, SPLND in 2019. She had minimal invasion, no LVSI, and negative washings. I did not recommend any adjuvant therapy, though I did discuss her risks of recurrence. She underwent genetic testing and had a VUS of the SMAD4 gene, while negative for any other mutations. She did have a second opinion at [facility] with Dr. Alexandru Kidd, and ultimately underwent vaginal cuff brachytherapy there, which she completed in 2020. She presented in 2020 for follow-up. She stated that she was enrolled in a clinical trial at [facility] on the effects of the use of vaginal dilators. She was last seen there a few months prior. She was without complaints. She denied any vaginal bleeding outside of when she uses the dilator, but that is not every time. She denied pelvic or abdominal pain. On pelvic exam I noted a small friable nodular area on the left anterolateral vagina measuring about 1.5 cm. I performed a biopsy of this suspicious lesion. The mass was almost removed completely with the biopsy. FINAL PATHOLOGIC DIAGNOSIS   Left upper vaginal wall, biopsy:   Metastatic adenocarcinoma, compatible with endometrial primary (see comment)   Comment   Immunochemical stains are performed. Tumor cells are positive for ER, ID, and p16 (patchy) while negative for CEA and Napsin A. Given the patient's history, morphologic findings, and immunohistochemical staining pattern the above diagnosis is rendered.      I ordered a CT of the chest/abdomen/pelvis to evaluate. It did not demonstrate any other disease. I recommended radiation therapy and suggested that she follow-up at [facility] with her radiation oncologist there, Dr. Frandy Butcher. She presented subsequently to discuss treatment options, as she was not enthusiastic about pelvic radiation therapy. She also stated she had another opinion scheduled with Dr. Jose Luis Oh at [facility] for the following day. He ordered a PET/CT that showed no evidence of disease outside of the pelvis. He essentially told her the same thing and recommended pelvic radiation therapy. Her referred her to Dr. Kenyatta Lutz with formerly [facility] for treatment. Following those consultation she went back to see Dr. Hui Sanderson at [facility], as well as Dr. Dean Nelson at [facility], for additional consultation. She stated that she and Dr. Chilango Basilio \"didn't mesh\". She presented to see me in January 2021 to ask my opinion on treatment and for my recommendations. I again explained to her that radiation therapy would be the standard of care. Surgical excision of the area might be an option, but still would not eliminate the need for radiation. I again explained that I didn't think chemotherapy, immunotherapy, or hormonal therapy would be good options, especially with radiation likely being a curative treatment. I was still not sure why she was so opposed to radiation and I again tried to alleviate her concerns regarding the treatment. I stressed that she needed to get started on therapy as soon as possible, as we had known about this recurrence since late September. I stressed that every day she delayed treatment she was decreasing her chances at salvage. She had another PET/CT in February 2021 at [facility] prior to her radiation, which was reportedly negative, though I don't have a copy of those results. She ultimately completed pelvic radiation therapy at [facility] with Dr. Hui Sanderson, which she completed in April 2021.       She presents today for surveillance and to review her latest CT scan.  She was last seen in the office in August to review her CT from [facility], performed on 8/9/21, which was negative. There was also no evidence of recurrent or metastatic disease on her newest scan. ROS:   and GI review:  Negative  Cardiopulmonary review:  Negative   Musculoskeletal:  Negative    A comprehensive review of systems was negative except for that written in the History of Present Illness. , 10 point ROS      Problem List:  Patient Active Problem List    Diagnosis Date Noted    Endometrial cancer ([facility] [address] 11/27/2019    Sinus bradycardia by electrocardiogram 11/21/2019    Pseudophakia of both eyes 05/29/2018    White coat syndrome without diagnosis of hypertension 08/07/2017    Living will, counseling/discussion 08/07/2017    Primary open angle glaucoma (POAG) 12/19/2016    Nuclear nonsenile cataract 12/19/2016    Vaccination refused by patient 10/06/2016    Colonoscopy refused 10/06/2016    Primary insomnia 10/06/2016    Glaucoma     Degenerative joint disease (DJD) of lumbar spine     GERD (gastroesophageal reflux disease)     Rheumatic fever     PUD (peptic ulcer disease)      PMH:  Past Medical History:   Diagnosis Date    BCC (basal cell carcinoma)     Cancer ([facility] [address] 10/2019    UTERINE    Degenerative joint disease (DJD) of lumbar spine     GERD (gastroesophageal reflux disease)     Glaucoma     Headache     Nausea & vomiting     PUD (peptic ulcer disease)     \"MANY YEARS AGO\"    Rheumatic fever     AS CHILD    Sinus bradycardia by electrocardiogram 11/21/2019      PSH:  Past Surgical History:   Procedure Laterality Date    HX COLPOSCOPY      abn pap    HX HEENT      SHAMA CATARACTS    HX HIP REPLACEMENT      x2    HX HYSTERECTOMY  11/27/2019    Dr. Dickie Lanes HX ORTHOPAEDIC      LEFT ELBOW FOR FX X2    HX OTHER SURGICAL      DOG BIT SURGERY ON RIGHT HAND    HX WISDOM TEETH EXTRACTION        Social History:  Social History     Tobacco Use    Smoking status: Former Smoker     Packs/day: 0.50     Years: 20.00     Pack years: 10.00     Quit date: 10/6/1978     Years since quittin.2    Smokeless tobacco: Never Used    Tobacco comment: SOMOKE OFF AND ON   Substance Use Topics    Alcohol use: Yes     Comment: RARELY      Family History:  Family History   Problem Relation Age of Onset    Cancer Mother         lung    OSTEOARTHRITIS Mother     Heart Disease Father         pacer    OSTEOARTHRITIS Father     Cancer Father         SKIN - basal cell    Pacemaker Father     Heart Surgery Father         STENT    Colon Polyps Father         no firm diagnosis of any cancer    Diabetes Sister     Other Sister         \"SLIGHT MENTAL RETARDATION\" - aphasic    Colon Polyps Sister         hx of polyps, uncertain of cancer    Other Sister         \"GUILLIAN BARRE\"    Breast Cancer Sister 76    Cancer Sister 58        endometrial    No Known Problems Daughter         born imperforate anus    Anesth Problems Neg Hx       Medications: (reviewed)  Current Outpatient Medications   Medication Sig    triamcinolone acetonide (KENALOG) 0.1 % topical cream Apply  to affected area two (2) times a day. use thin layer    latanoprost (XALATAN) 0.005 % ophthalmic solution Administer 1 Drop to both eyes nightly.  calcium carbonate (TUMS) 200 mg calcium (500 mg) chew Take 1 Tab by mouth daily.  psyllium husk (METAMUCIL PO) Take 1 Tab by mouth nightly.  vit A/vit C/vit E/zinc/copper (PRESERVISION AREDS PO) Take 1 Tab by mouth every fourty-eight (48) hours.  naproxen sodium (ALEVE) 220 mg cap Take  by mouth daily as needed.  SF 5000 PLUS 1.1 % crea TOOTHPASTE    timolol (TIMOPTIC-XE) 0.25 % ophthalmic gel-forming Administer 1 Drop to both eyes daily.  Omega-3 Fatty Acids 300 mg cap Take 3 Tabs by mouth daily.  multivitamin (ONE A DAY) tablet Take 1 Tab by mouth daily.  OR AREDS    cholecalciferol (VITAMIN D3) 1,000 unit tablet Take 1,000 Units by mouth daily. No current facility-administered medications for this visit. Allergies: (reviewed)  Allergies   Allergen Reactions    Shellfish Containing Products Diarrhea    Aspirin Other (comments)     heartburn    Penicillins Rash     Had PCN many years ago and had a rash. Has had it once since then with no reaction. OBJECTIVE:    Physical Exam:  VITAL SIGNS: Vitals:    12/14/21 1053   BP: (!) 156/84   Pulse: (!) 58   Weight: 158 lb (71.7 kg)   Height: 5' 7.01\" (1.702 m)     Body mass index is 24.74 kg/m². GENERAL SHAMEKA: WD, WN, WF in NAD   HEENT: WNL   RESPIRATORY: CTA   CARDIOVASC: RRR   GASTROINT: Soft, NT, ND   MUSCULOSKEL: WNL   EXTREMITIES: No edema   PELVIC: Deferred   RECTAL: Deferred   EDEL SURVEY: Negative   NEURO: Grossly intact       Lab Data:    Lab Results   Component Value Date/Time    WBC 12.7 (H) 11/28/2019 04:16 AM    HGB 11.3 (L) 11/28/2019 04:16 AM    HCT 34.7 (L) 11/28/2019 04:16 AM    PLATELET 040 80/33/9793 04:16 AM    MCV 94.3 11/28/2019 04:16 AM     Lab Results   Component Value Date/Time    Sodium 134 (L) 11/28/2019 04:16 AM    Potassium 3.8 11/28/2019 04:16 AM    Chloride 102 11/28/2019 04:16 AM    CO2 27 11/28/2019 04:16 AM    Anion gap 5 11/28/2019 04:16 AM    Glucose 154 (H) 11/28/2019 04:16 AM    BUN 14 11/28/2019 04:16 AM    Creatinine 1.05 (H) 11/28/2019 04:16 AM    BUN/Creatinine ratio 13 11/28/2019 04:16 AM    GFR est AA >60 11/28/2019 04:16 AM    GFR est non-AA 52 (L) 11/28/2019 04:16 AM    Calcium 8.4 (L) 11/28/2019 04:16 AM         CT of chest/abdomen/pelvis (10/3/20)  Chest:  Lungs: There is a calcified granuloma within the right lower lobe. Lungs are  otherwise clear.     Lymph nodes: There is no mediastinal, hilar or axillary lymphadenopathy. There  are several calcified mediastinal and right hilar lymph nodes.     Pleura: There is no prior study for direct comparison.     Heart:  The heart is normal in size and there is no pericardial fluid.     Bones: No lytic or sclerotic osseous lesion is visualized.     Abdomen/pelvis:  Liver: There is diffuse fatty infiltration of the liver. There is no suspicious  focal hepatic lesion.     Spleen: The spleen is normal.     Pancreas: The pancreas is normal.     Adrenals: The adrenals are normal.     Gallbladder: There is a gallstone in the gallbladder; there is no CT evidence of  acute cholecystitis.      Kidneys: There is a 2 mm non-obstructing left renal calculus. There are 2  subcentimeter left renal hypodensities, too small to further characterize, but  statistically likely to represent cysts. Note is made of a left retroaortic  renal vein.     Lymph nodes\omentum\peritoneum. There is no marisa hepatis, mesenteric,  retroperitoneal or pelvic lymphadenopathy. No intra-abdominal soft tissue nodule  or mass is seen.     Bowel: No dilated or thickened loop of large or small bowel is seen.     Urinary bladder: Urinary bladder is partially filled and grossly normal.     Bones: There is beam hardening artifact in the pelvis related to bilateral hip  prostheses. No lytic or sclerotic osseous lesion is visualized.     Miscellaneous: There is no free intraperitoneal gas or fluid. There is no focal  fluid collection to suggest abscess. Uterus is absent. The left and right  ovaries are not visualized, however there are two adjacent cysts within the left  hemipelvis, the larger of the two measures 2.3 cm x 2.2 cm. IMPRESSION: No evidence of metastatic disease within the chest, abdomen or  pelvis. Two adjacent cysts within the left hemipelvis, arising from the left  ovary. PET/CT (1/27/21) - [facility]  Head and Neck: Physiologic radiotracer uptake is present within imaged portion of brain, adenoids, tonsils, salivary glands, and vocal cords. No hypermetabolic cervical adenopathy. There is increased radiotracer uptake in the extraocular muscles and tongue, likely due to activation.  Thyroid gland is unremarkable. Chest: Heart size is normal. No pericardial effusion. Physiologic uptake is seen in the myocardium. Multiple calcified mediastinal and right hilar lymph nodes likely from prior granulomatous insult. No axillary adenopathy. Central airways are patent. No hypermetabolic lung parenchymal lesions. No focal consolidation. No pleural effusion or pneumothorax. Abdomen and Pelvis: There is physiologic FDG distribution with uniform uptake within liver and spleen. Large peripherally calcified gallstone in the body of the gallbladder. Pancreas is atrophic. No adrenal mass. Tracer excretion can be visualized in bilateral kidneys, ureters, and urinary bladder. Extensive streak artifact from bilateral hip arthroplasties limits assessment of pelvic structures. Status post WESLY and BSO. There is a hypermetabolic focus in the vaginal cuff with a max SUV of 7.0 (image 197). Redemonstrated photopenic bilobed cystic structure adjacent to the left external iliac vessels measuring 1.5 x 2.0 cm (image 162). There is a small to moderate-sized sliding-type hiatal hernia with layering debris in the lower esophagus and increased FDG uptake, max SUV 4.3 (image 67). Tracer distribution within the stomach and bowel appears. Physiologic. No hypermetabolic abdominopelvic lymphadenopathy. Bones and Soft Tissues: No suspicious focal abnormalities are identified within visualized osseous structures. Status post bilateral total hip arthroplasties. Multilevel degenerative changes of the visualized spine with chronic appearing compression deformity of the L1 vertebral body. Impression and Comments:   1.  Final report. 2.  Limited assessment of the pelvic structures secondary to extensive streak artifact from the bilateral hip arthroplasties. 3.  Status post WESLY and BSO. There is focal increased FDG uptake in the right vaginal cuff which likely corresponds with area of known recurrence.    4.  No definite evidence of distant metastatic disease. 5.  Cystic structure adjacent to the left external iliac vessels does not demonstrate increased FDG uptake. Consider further evaluation with a pelvic ultrasound to better characterize. 6.  Small to moderate-sized sliding-type hiatal hernia with increased metabolic activity noted in the lower esophagus suggestive of reflux esophagitis. 7.  Stable appearance of multiple calcified mediastinal and hilar lymph nodes suggestive of prior granulomatous insult. CT of abdomen/pelvis (8/9/21) - [facility]  Lower thorax: Limited imaging of the lung bases showed no evidence of airspace disease, nodule, or pleural effusion. The heart size was normal. See chest CT report for complete chest findings. Liver: Normal size. Peripheral 5 mm hypodensity inferior right hepatic lobe, too small to characterize. Ill-defined 18 x 16 mm area of hypodensity adjacent to falciform ligament. May represent fatty change along falciform ligament however more prominent than on prior study still favor focal fat however metastatic lesion not excluded. MR would clarify. . No other focal lesions seen. Biliary tract: Large rim calcified 2.1 cm gallstone. No evidence of acute cholecystitis or dilated ducts. Pancreas: Normal size and contour. No dilated ducts. Spleen: Normal size. No defects. Adrenal glands: Normal.     Kidneys: 2.9 mm nonobstructing calculus left kidney, 13 mm cyst left kidney as well as subcentimeter hypodensities. Right kidney normal. No hydronephrosis or mass. Vascular structures: Scattered calcifications throughout abdominal aorta. No aneurysm. Major branch vessels patent. Retroaortic left renal vein, anatomic variant. No calcifications and left common iliac artery with punctate calcification right common iliac artery. No calcifications and remainder of the iliac arteries which are patent bilaterally. Peritoneum and retroperitoneum: No evidence of free air or free fluid. Borderline enlarged celiac axis lymph node with no periaortic, mesenteric, or pelvic lymphadenopathy. GI tract: Small sliding hiatal hernia. Stomach otherwise normal. Duodenum and small bowel have a normal appearance. Sigmoid diverticulosis without diverticulitis. Remainder of colon has a normal appearance. Appendix identified and normal.     Pelvis: Artifact over pelvis from hip replacements. Status post hysterectomy. Bladder distended and appears to be normal. Bilobular left adnexal mass measuring 4.9 x 2.2 cm, similar to prior. Bones and soft tissues: Bilateral total hip replacements. Artifact and pelvis from prostheses. Prostheses appear to be in satisfactory position. Degenerative changes of both acetabula and both sacroiliac joints. Degenerative disc disease L5-S1 and L4-L5 as well as L2-L3. Compression fracture L1, unchanged. Scoliosis. No suspicious bony lesion. CONCLUSIONS:   1. Status post hysterectomy Artifact over lower pelvis in area of vaginal cuff excludes complete evaluation of this area. No definite pelvic mass. Bilobular cystic left adnexal mass. Stable compared to prior. Follow-up with ultrasound. 2. Ill-defined 18 x 16 mm hypodensity adjacent to falciform ligament in liver. May be area of focal fat, favor, however more distinct and slightly larger on this study. Metastatic focus can't be excluded. MR would clarify. 3. Gallstone without acute cholecystectomy   4. No adenopathy. 5. Left renal cysts, stable. 6. Diverticulosis without diverticulitis. 7. Bilateral total hip replacements. Degenerative changes lumbosacral spine with altered level degenerative disc disease, compression fracture, chronic, L1 and scoliosis. CT of abdomen/pelvis (12/10/21) - [facility]  Lower thorax: Limited imaging of the lung bases showed no evidence of airspace disease, nodule, or pleural effusion. The heart size was normal. See chest CT report for complete chest findings. Liver: Normal size. Peripheral 5 mm hypodensity inferior right hepatic lobe, too small to characterize. Ill-defined 18 x 16 mm area of hypodensity adjacent to falciform ligament unchanged, most likely representing focal fatty infiltration. No other focal lesions seen. Biliary tract: Large rim calcified 1.5 cm gallstone. No evidence of acute cholecystitis or dilated ducts. Pancreas: Normal size and contour. No dilated ducts. Spleen: Normal size. No defects. Adrenal glands: Normal.     Kidneys: 2.9 mm nonobstructing calculus left kidney, 13 mm cyst left kidney as well as subcentimeter hypodensities. Right kidney normal. No hydronephrosis or mass. Vascular structures: Scattered calcifications throughout abdominal aorta. No aneurysm. Major branch vessels patent. Retroaortic left renal vein, anatomic variant. No calcifications and left common iliac artery with punctate calcification right common iliac artery. No calcifications and remainder of the iliac arteries which are patent bilaterally. Peritoneum and retroperitoneum: No evidence of free air or free fluid. No enlarged lymph nodes in the abdomen or in the pelvis. GI tract: Small sliding hiatal hernia. Stomach otherwise normal. Duodenum and small bowel have a normal appearance. Sigmoid diverticulosis without diverticulitis. Remainder of colon has a normal appearance. Appendix identified and normal.     Status post hysterectomy. Bladder is under distended and appears to be normal. Bilobular left adnexal cystic mass measuring 4.9 x 2.2 cm on image #53 of series 7, similar to prior. Bones and soft tissues: Bilateral total hip replacements. Prostheses appear to be in satisfactory position. Degenerative changes of both acetabula and both sacroiliac joints. Degenerative disc disease L5-S1 and L4-L5 as well as L2-L3. Compression fracture L1, unchanged. Scoliosis. No suspicious bony lesion.      CONCLUSIONS:   1.  No definite evidence of tumor recurrence in the pelvis or metastasis to the abdomen or to the pelvis. 2.  Status post hysterectomy. Stable cystic mass in the left adnexa. 3.  Stable ill-defined hypodensity at adjacent to the falciform ligament of the liver most likely representing focal fatty infiltration. 4. Gallstone without acute cholecystectomy   5. No abdominal or pelvic free fluid or lymphadenopathy   6. Bilateral total hip replacements. Degenerative changes lumbosacral spine with altered level degenerative disc disease, compression fracture, chronic, L1 and scoliosis. IMPRESSION/PLAN:  Mimi Shirley is a 66 y.o. female with a diagnosis of stage IA, grade 3, endometrial carcinoma. Following her RATLH, BSO, SPLND, she received vaginal brachytherapy at [facility]. She subsequently developed an isolated vaginal cuff recurrence in September 2020. She was recommended pelvic radiation therapy by myself and multiple other consulting physicians. She ultimately completed whole pelvic radiation therapy at [facility]. She has no evidence of disease on her recent CT done at [facility]. I will plan on seeing her back in 3 months. I do not see a need to repeat imaging prior to that visit, unless symptoms dictate otherwise. An electronic signature was used to sign this note.     Deloris Kaplan MD  12/14/21

## 2022-02-01 ENCOUNTER — OFFICE VISIT (OUTPATIENT)
Dept: GYNECOLOGY | Age: 72
End: 2022-02-01
Payer: MEDICARE

## 2022-02-01 VITALS
HEART RATE: 54 BPM | WEIGHT: 157 LBS | SYSTOLIC BLOOD PRESSURE: 147 MMHG | BODY MASS INDEX: 24.64 KG/M2 | HEIGHT: 67 IN | DIASTOLIC BLOOD PRESSURE: 84 MMHG

## 2022-02-01 DIAGNOSIS — C54.1 ENDOMETRIAL CANCER (HCC): Primary | ICD-10-CM

## 2022-02-01 PROCEDURE — G8420 CALC BMI NORM PARAMETERS: HCPCS | Performed by: OBSTETRICS & GYNECOLOGY

## 2022-02-01 PROCEDURE — 1101F PT FALLS ASSESS-DOCD LE1/YR: CPT | Performed by: OBSTETRICS & GYNECOLOGY

## 2022-02-01 PROCEDURE — G8432 DEP SCR NOT DOC, RNG: HCPCS | Performed by: OBSTETRICS & GYNECOLOGY

## 2022-02-01 PROCEDURE — 99213 OFFICE O/P EST LOW 20 MIN: CPT | Performed by: OBSTETRICS & GYNECOLOGY

## 2022-02-01 PROCEDURE — G8536 NO DOC ELDER MAL SCRN: HCPCS | Performed by: OBSTETRICS & GYNECOLOGY

## 2022-02-01 PROCEDURE — 3017F COLORECTAL CA SCREEN DOC REV: CPT | Performed by: OBSTETRICS & GYNECOLOGY

## 2022-02-01 PROCEDURE — G0463 HOSPITAL OUTPT CLINIC VISIT: HCPCS | Performed by: OBSTETRICS & GYNECOLOGY

## 2022-02-01 PROCEDURE — 1090F PRES/ABSN URINE INCON ASSESS: CPT | Performed by: OBSTETRICS & GYNECOLOGY

## 2022-02-01 PROCEDURE — G8427 DOCREV CUR MEDS BY ELIG CLIN: HCPCS | Performed by: OBSTETRICS & GYNECOLOGY

## 2022-02-01 PROCEDURE — G8399 PT W/DXA RESULTS DOCUMENT: HCPCS | Performed by: OBSTETRICS & GYNECOLOGY

## 2022-02-01 NOTE — PROGRESS NOTES
History of endometrial cancer. Patient complains of a vaginal discharge that is different from past discharge. Patient denies pain and bleeding.

## 2022-02-01 NOTE — PROGRESS NOTES
[address] [place] [address] Mathias Mor[address], [address]  P [phone]  F [phone]    Office Note  Patient ID:  Name:  Chra Benavidez  MRN:  [id number]  :  1950/71 y.o. Date:  2022      HISTORY OF PRESENT ILLNESS:  Char Benavidez is a 66 y.o.  postmenopausal female who is an established patient with a diagnosis of stage IA, grade 3 endometrial cancer. She underwent a robotic assisted TLH, BSO, SPLND in 2019. She had minimal invasion, no LVSI, and negative washings. I did not recommend any adjuvant therapy, though I did discuss her risks of recurrence. She underwent genetic testing and had a VUS of the SMAD4 gene, while negative for any other mutations. She did have a second opinion at [facility] with Dr. Isis Sanders, and ultimately underwent vaginal cuff brachytherapy there, which she completed in 2020. She presented in 2020 for follow-up. She stated that she was enrolled in a clinical trial at [facility] on the effects of the use of vaginal dilators. She was last seen there a few months prior. She was without complaints. She denied any vaginal bleeding outside of when she uses the dilator, but that is not every time. She denied pelvic or abdominal pain. On pelvic exam I noted a small friable nodular area on the left anterolateral vagina measuring about 1.5 cm. I performed a biopsy of this suspicious lesion. The mass was almost removed completely with the biopsy. FINAL PATHOLOGIC DIAGNOSIS   Left upper vaginal wall, biopsy:   Metastatic adenocarcinoma, compatible with endometrial primary (see comment)   Comment   Immunochemical stains are performed. Tumor cells are positive for ER, LA, and p16 (patchy) while negative for CEA and Napsin A. Given the patient's history, morphologic findings, and immunohistochemical staining pattern the above diagnosis is rendered.      I ordered a CT of the chest/abdomen/pelvis to evaluate. It did not demonstrate any other disease. I recommended radiation therapy and suggested that she follow-up at [facility] with her radiation oncologist there, Dr. Immanuel Mariscal. She presented subsequently to discuss treatment options, as she was not enthusiastic about pelvic radiation therapy. She also stated she had another opinion scheduled with Dr. Raina Spear at [facility] for the following day. He ordered a PET/CT that showed no evidence of disease outside of the pelvis. He essentially told her the same thing and recommended pelvic radiation therapy. Her referred her to Dr. Teresa Adan with [facility] for treatment. Following those consultation she went back to see Dr. Ramiro Miller at [facility], as well as Dr. Scott Hoyos at [facility], for additional consultation. She stated that she and Dr. David De Dios \"didn't mesh\". She presented to see me in January 2021 to ask my opinion on treatment and for my recommendations. I again explained to her that radiation therapy would be the standard of care. Surgical excision of the area might be an option, but still would not eliminate the need for radiation. I again explained that I didn't think chemotherapy, immunotherapy, or hormonal therapy would be good options, especially with radiation likely being a curative treatment. I was still not sure why she was so opposed to radiation and I again tried to alleviate her concerns regarding the treatment. I stressed that she needed to get started on therapy as soon as possible, as we had known about this recurrence since late September. I stressed that every day she delayed treatment she was decreasing her chances at salvage. She had another PET/CT in February 2021 at [facility] prior to her radiation, which was reportedly negative, though I don't have a copy of those results. She ultimately completed pelvic radiation therapy at [facility] with Dr. Ramiro Miller, which she completed in April 2021.       She was seen in December 2021 for surveillance and to review her latest CT scan. She was last seen in the office in August to review her CT from [facility], performed on 8/9/21, which was negative. There was also no evidence of recurrent or metastatic disease on her last scan. She presents today complaining of discharge. ROS:   and GI review:  Negative  Cardiopulmonary review:  Negative   Musculoskeletal:  Negative    A comprehensive review of systems was negative except for that written in the History of Present Illness. , 10 point ROS      Problem List:  Patient Active Problem List    Diagnosis Date Noted    Endometrial cancer ([facility] 11/27/2019    Sinus bradycardia by electrocardiogram 11/21/2019    Pseudophakia of both eyes 05/29/2018    White coat syndrome without diagnosis of hypertension 08/07/2017    Living will, counseling/discussion 08/07/2017    Primary open angle glaucoma (POAG) 12/19/2016    Nuclear nonsenile cataract 12/19/2016    Vaccination refused by patient 10/06/2016    Colonoscopy refused 10/06/2016    Primary insomnia 10/06/2016    Glaucoma     Degenerative joint disease (DJD) of lumbar spine     GERD (gastroesophageal reflux disease)     Rheumatic fever     PUD (peptic ulcer disease)      PMH:  Past Medical History:   Diagnosis Date    BCC (basal cell carcinoma)     Cancer ([facility] 10/2019    UTERINE    Degenerative joint disease (DJD) of lumbar spine     GERD (gastroesophageal reflux disease)     Glaucoma     Headache     Nausea & vomiting     PUD (peptic ulcer disease)     \"MANY YEARS AGO\"    Rheumatic fever     AS CHILD    Sinus bradycardia by electrocardiogram 11/21/2019      PSH:  Past Surgical History:   Procedure Laterality Date    HX COLPOSCOPY      abn pap    HX HEENT      SHAMA CATARACTS    HX HIP REPLACEMENT      x2    HX HYSTERECTOMY  11/27/2019    Dr. Chao Trivedi HX ORTHOPAEDIC      LEFT ELBOW FOR FX X2    HX OTHER SURGICAL      DOG BIT SURGERY ON RIGHT HAND    HX WISDOM TEETH EXTRACTION        Social History:  Social History     Tobacco Use    Smoking status: Former Smoker     Packs/day: 0.50     Years: 20.00     Pack years: 10.00     Quit date: 10/6/1978     Years since quittin.3    Smokeless tobacco: Never Used    Tobacco comment: SOMOKE OFF AND ON   Substance Use Topics    Alcohol use: Yes     Comment: RARELY      Family History:  Family History   Problem Relation Age of Onset    Cancer Mother         lung    OSTEOARTHRITIS Mother     Heart Disease Father         pacer    OSTEOARTHRITIS Father     Cancer Father         SKIN - basal cell    Pacemaker Father     Heart Surgery Father         STENT    Colon Polyps Father         no firm diagnosis of any cancer    Diabetes Sister     Other Sister         \"SLIGHT MENTAL RETARDATION\" - aphasic    Colon Polyps Sister         hx of polyps, uncertain of cancer    Other Sister         \"GUILLIAN BARRE\"    Breast Cancer Sister 76    Cancer Sister 58        endometrial    No Known Problems Daughter         born imperforate anus    Anesth Problems Neg Hx       Medications: (reviewed)  Current Outpatient Medications   Medication Sig    triamcinolone acetonide (KENALOG) 0.1 % topical cream Apply  to affected area two (2) times a day. use thin layer    latanoprost (XALATAN) 0.005 % ophthalmic solution Administer 1 Drop to both eyes nightly.  calcium carbonate (TUMS) 200 mg calcium (500 mg) chew Take 1 Tab by mouth daily.  psyllium husk (METAMUCIL PO) Take 1 Tab by mouth nightly.  vit A/vit C/vit E/zinc/copper (PRESERVISION AREDS PO) Take 1 Tab by mouth every fourty-eight (48) hours.  naproxen sodium (ALEVE) 220 mg cap Take  by mouth daily as needed.  SF 5000 PLUS 1.1 % crea TOOTHPASTE    timolol (TIMOPTIC-XE) 0.25 % ophthalmic gel-forming Administer 1 Drop to both eyes daily.  Omega-3 Fatty Acids 300 mg cap Take 3 Tabs by mouth daily.  multivitamin (ONE A DAY) tablet Take 1 Tab by mouth daily.  OR AREDS    cholecalciferol (VITAMIN D3) 1,000 unit tablet Take 1,000 Units by mouth daily. No current facility-administered medications for this visit. Allergies: (reviewed)  Allergies   Allergen Reactions    Shellfish Containing Products Diarrhea    Aspirin Other (comments)     heartburn    Penicillins Rash     Had PCN many years ago and had a rash. Has had it once since then with no reaction. OBJECTIVE:    Physical Exam:  VITAL SIGNS: Vitals:    02/01/22 0954   BP: (!) 147/84   Pulse: (!) 54   Weight: 157 lb (71.2 kg)   Height: 5' 7.01\" (1.702 m)     Body mass index is 24.58 kg/m². GENERAL SHAMEKA: WD, WN, WF in NAD   HEENT: WNL   RESPIRATORY: CTA   CARDIOVASC: RRR   GASTROINT: Soft, NT, ND   MUSCULOSKEL: WNL   EXTREMITIES: No edema   PELVIC: Deferred   RECTAL: Deferred   EDEL SURVEY: Negative   NEURO: Grossly intact       Lab Data:    Lab Results   Component Value Date/Time    WBC 12.7 (H) 11/28/2019 04:16 AM    HGB 11.3 (L) 11/28/2019 04:16 AM    HCT 34.7 (L) 11/28/2019 04:16 AM    PLATELET 190 52/10/8488 04:16 AM    MCV 94.3 11/28/2019 04:16 AM     Lab Results   Component Value Date/Time    Sodium 134 (L) 11/28/2019 04:16 AM    Potassium 3.8 11/28/2019 04:16 AM    Chloride 102 11/28/2019 04:16 AM    CO2 27 11/28/2019 04:16 AM    Anion gap 5 11/28/2019 04:16 AM    Glucose 154 (H) 11/28/2019 04:16 AM    BUN 14 11/28/2019 04:16 AM    Creatinine 1.05 (H) 11/28/2019 04:16 AM    BUN/Creatinine ratio 13 11/28/2019 04:16 AM    GFR est AA >60 11/28/2019 04:16 AM    GFR est non-AA 52 (L) 11/28/2019 04:16 AM    Calcium 8.4 (L) 11/28/2019 04:16 AM         CT of chest/abdomen/pelvis (10/3/20)  Chest:  Lungs: There is a calcified granuloma within the right lower lobe. Lungs are  otherwise clear.     Lymph nodes: There is no mediastinal, hilar or axillary lymphadenopathy. There  are several calcified mediastinal and right hilar lymph nodes.     Pleura:  There is no prior study for direct comparison.     Heart: The heart is normal in size and there is no pericardial fluid.     Bones: No lytic or sclerotic osseous lesion is visualized.     Abdomen/pelvis:  Liver: There is diffuse fatty infiltration of the liver. There is no suspicious  focal hepatic lesion.     Spleen: The spleen is normal.     Pancreas: The pancreas is normal.     Adrenals: The adrenals are normal.     Gallbladder: There is a gallstone in the gallbladder; there is no CT evidence of  acute cholecystitis.      Kidneys: There is a 2 mm non-obstructing left renal calculus. There are 2  subcentimeter left renal hypodensities, too small to further characterize, but  statistically likely to represent cysts. Note is made of a left retroaortic  renal vein.     Lymph nodes\omentum\peritoneum. There is no marisa hepatis, mesenteric,  retroperitoneal or pelvic lymphadenopathy. No intra-abdominal soft tissue nodule  or mass is seen.     Bowel: No dilated or thickened loop of large or small bowel is seen.     Urinary bladder: Urinary bladder is partially filled and grossly normal.     Bones: There is beam hardening artifact in the pelvis related to bilateral hip  prostheses. No lytic or sclerotic osseous lesion is visualized.     Miscellaneous: There is no free intraperitoneal gas or fluid. There is no focal  fluid collection to suggest abscess. Uterus is absent. The left and right  ovaries are not visualized, however there are two adjacent cysts within the left  hemipelvis, the larger of the two measures 2.3 cm x 2.2 cm. IMPRESSION: No evidence of metastatic disease within the chest, abdomen or  pelvis. Two adjacent cysts within the left hemipelvis, arising from the left  ovary. PET/CT (1/27/21) - [facility]  Head and Neck: Physiologic radiotracer uptake is present within imaged portion of brain, adenoids, tonsils, salivary glands, and vocal cords. No hypermetabolic cervical adenopathy.  There is increased radiotracer uptake in the extraocular muscles and tongue, likely due to activation. Thyroid gland is unremarkable. Chest: Heart size is normal. No pericardial effusion. Physiologic uptake is seen in the myocardium. Multiple calcified mediastinal and right hilar lymph nodes likely from prior granulomatous insult. No axillary adenopathy. Central airways are patent. No hypermetabolic lung parenchymal lesions. No focal consolidation. No pleural effusion or pneumothorax. Abdomen and Pelvis: There is physiologic FDG distribution with uniform uptake within liver and spleen. Large peripherally calcified gallstone in the body of the gallbladder. Pancreas is atrophic. No adrenal mass. Tracer excretion can be visualized in bilateral kidneys, ureters, and urinary bladder. Extensive streak artifact from bilateral hip arthroplasties limits assessment of pelvic structures. Status post WESLY and BSO. There is a hypermetabolic focus in the vaginal cuff with a max SUV of 7.0 (image 197). Redemonstrated photopenic bilobed cystic structure adjacent to the left external iliac vessels measuring 1.5 x 2.0 cm (image 162). There is a small to moderate-sized sliding-type hiatal hernia with layering debris in the lower esophagus and increased FDG uptake, max SUV 4.3 (image 67). Tracer distribution within the stomach and bowel appears. Physiologic. No hypermetabolic abdominopelvic lymphadenopathy. Bones and Soft Tissues: No suspicious focal abnormalities are identified within visualized osseous structures. Status post bilateral total hip arthroplasties. Multilevel degenerative changes of the visualized spine with chronic appearing compression deformity of the L1 vertebral body. Impression and Comments:   1.  Final report. 2.  Limited assessment of the pelvic structures secondary to extensive streak artifact from the bilateral hip arthroplasties. 3.  Status post WESLY and BSO.  There is focal increased FDG uptake in the right vaginal cuff which likely corresponds with area of known recurrence. 4.  No definite evidence of distant metastatic disease. 5.  Cystic structure adjacent to the left external iliac vessels does not demonstrate increased FDG uptake. Consider further evaluation with a pelvic ultrasound to better characterize. 6.  Small to moderate-sized sliding-type hiatal hernia with increased metabolic activity noted in the lower esophagus suggestive of reflux esophagitis. 7.  Stable appearance of multiple calcified mediastinal and hilar lymph nodes suggestive of prior granulomatous insult. CT of abdomen/pelvis (8/9/21) - [facility]  Lower thorax: Limited imaging of the lung bases showed no evidence of airspace disease, nodule, or pleural effusion. The heart size was normal. See chest CT report for complete chest findings. Liver: Normal size. Peripheral 5 mm hypodensity inferior right hepatic lobe, too small to characterize. Ill-defined 18 x 16 mm area of hypodensity adjacent to falciform ligament. May represent fatty change along falciform ligament however more prominent than on prior study still favor focal fat however metastatic lesion not excluded. MR would clarify. . No other focal lesions seen. Biliary tract: Large rim calcified 2.1 cm gallstone. No evidence of acute cholecystitis or dilated ducts. Pancreas: Normal size and contour. No dilated ducts. Spleen: Normal size. No defects. Adrenal glands: Normal.     Kidneys: 2.9 mm nonobstructing calculus left kidney, 13 mm cyst left kidney as well as subcentimeter hypodensities. Right kidney normal. No hydronephrosis or mass. Vascular structures: Scattered calcifications throughout abdominal aorta. No aneurysm. Major branch vessels patent. Retroaortic left renal vein, anatomic variant. No calcifications and left common iliac artery with punctate calcification right common iliac artery. No calcifications and remainder of the iliac arteries which are patent bilaterally.      Peritoneum and retroperitoneum: No evidence of free air or free fluid. Borderline enlarged celiac axis lymph node with no periaortic, mesenteric, or pelvic lymphadenopathy. GI tract: Small sliding hiatal hernia. Stomach otherwise normal. Duodenum and small bowel have a normal appearance. Sigmoid diverticulosis without diverticulitis. Remainder of colon has a normal appearance. Appendix identified and normal.     Pelvis: Artifact over pelvis from hip replacements. Status post hysterectomy. Bladder distended and appears to be normal. Bilobular left adnexal mass measuring 4.9 x 2.2 cm, similar to prior. Bones and soft tissues: Bilateral total hip replacements. Artifact and pelvis from prostheses. Prostheses appear to be in satisfactory position. Degenerative changes of both acetabula and both sacroiliac joints. Degenerative disc disease L5-S1 and L4-L5 as well as L2-L3. Compression fracture L1, unchanged. Scoliosis. No suspicious bony lesion. CONCLUSIONS:   1. Status post hysterectomy Artifact over lower pelvis in area of vaginal cuff excludes complete evaluation of this area. No definite pelvic mass. Bilobular cystic left adnexal mass. Stable compared to prior. Follow-up with ultrasound. 2. Ill-defined 18 x 16 mm hypodensity adjacent to falciform ligament in liver. May be area of focal fat, favor, however more distinct and slightly larger on this study. Metastatic focus can't be excluded. MR would clarify. 3. Gallstone without acute cholecystectomy   4. No adenopathy. 5. Left renal cysts, stable. 6. Diverticulosis without diverticulitis. 7. Bilateral total hip replacements. Degenerative changes lumbosacral spine with altered level degenerative disc disease, compression fracture, chronic, L1 and scoliosis. CT of abdomen/pelvis (12/10/21) - [facility]  Lower thorax: Limited imaging of the lung bases showed no evidence of airspace disease, nodule, or pleural effusion.  The heart size was normal. See chest CT report for complete chest findings. Liver: Normal size. Peripheral 5 mm hypodensity inferior right hepatic lobe, too small to characterize. Ill-defined 18 x 16 mm area of hypodensity adjacent to falciform ligament unchanged, most likely representing focal fatty infiltration. No other focal lesions seen. Biliary tract: Large rim calcified 1.5 cm gallstone. No evidence of acute cholecystitis or dilated ducts. Pancreas: Normal size and contour. No dilated ducts. Spleen: Normal size. No defects. Adrenal glands: Normal.     Kidneys: 2.9 mm nonobstructing calculus left kidney, 13 mm cyst left kidney as well as subcentimeter hypodensities. Right kidney normal. No hydronephrosis or mass. Vascular structures: Scattered calcifications throughout abdominal aorta. No aneurysm. Major branch vessels patent. Retroaortic left renal vein, anatomic variant. No calcifications and left common iliac artery with punctate calcification right common iliac artery. No calcifications and remainder of the iliac arteries which are patent bilaterally. Peritoneum and retroperitoneum: No evidence of free air or free fluid. No enlarged lymph nodes in the abdomen or in the pelvis. GI tract: Small sliding hiatal hernia. Stomach otherwise normal. Duodenum and small bowel have a normal appearance. Sigmoid diverticulosis without diverticulitis. Remainder of colon has a normal appearance. Appendix identified and normal.     Status post hysterectomy. Bladder is under distended and appears to be normal. Bilobular left adnexal cystic mass measuring 4.9 x 2.2 cm on image #53 of series 7, similar to prior. Bones and soft tissues: Bilateral total hip replacements. Prostheses appear to be in satisfactory position. Degenerative changes of both acetabula and both sacroiliac joints. Degenerative disc disease L5-S1 and L4-L5 as well as L2-L3. Compression fracture L1, unchanged. Scoliosis. No suspicious bony lesion. CONCLUSIONS:   1.  No definite evidence of tumor recurrence in the pelvis or metastasis to the abdomen or to the pelvis. 2.  Status post hysterectomy. Stable cystic mass in the left adnexa. 3.  Stable ill-defined hypodensity at adjacent to the falciform ligament of the liver most likely representing focal fatty infiltration. 4. Gallstone without acute cholecystectomy   5. No abdominal or pelvic free fluid or lymphadenopathy   6. Bilateral total hip replacements. Degenerative changes lumbosacral spine with altered level degenerative disc disease, compression fracture, chronic, L1 and scoliosis. IMPRESSION/PLAN:  Zack Campbell is a 66 y.o. female with a diagnosis of stage IA, grade 3, endometrial carcinoma. Following her RATLH, BSO, SPLND, she received vaginal brachytherapy at [facility]. She subsequently developed an isolated vaginal cuff recurrence in September 2020. She was recommended pelvic radiation therapy by myself and multiple other consulting physicians. She ultimately completed whole pelvic radiation therapy at [facility]. She has no evidence of disease on her recent CT done at [facility]. I will plan on seeing her back in 3 months. I do not see a need to repeat imaging prior to that visit, unless symptoms dictate otherwise. An electronic signature was used to sign this note.     Shyam Araiza MD  02/01/22

## 2022-03-14 NOTE — PROGRESS NOTES
Three month check up. Pt states no abnormal spotting, bleeding or pain. Patient continues to have a vaginal discharge.

## 2022-03-15 ENCOUNTER — OFFICE VISIT (OUTPATIENT)
Dept: GYNECOLOGY | Age: 72
End: 2022-03-15
Payer: MEDICARE

## 2022-03-15 ENCOUNTER — HOSPITAL ENCOUNTER (OUTPATIENT)
Dept: LAB | Age: 72
Discharge: HOME | End: 2022-03-15
Payer: MEDICARE

## 2022-03-15 VITALS
SYSTOLIC BLOOD PRESSURE: 132 MMHG | HEIGHT: 67 IN | WEIGHT: 158.2 LBS | HEART RATE: 54 BPM | DIASTOLIC BLOOD PRESSURE: 90 MMHG | BODY MASS INDEX: 24.83 KG/M2

## 2022-03-15 DIAGNOSIS — C54.1 ENDOMETRIAL CANCER (HCC): Primary | ICD-10-CM

## 2022-03-15 PROCEDURE — 88175 CYTOPATH C/V AUTO FLUID REDO: CPT

## 2022-03-15 PROCEDURE — 1090F PRES/ABSN URINE INCON ASSESS: CPT | Performed by: OBSTETRICS & GYNECOLOGY

## 2022-03-15 PROCEDURE — G8427 DOCREV CUR MEDS BY ELIG CLIN: HCPCS | Performed by: OBSTETRICS & GYNECOLOGY

## 2022-03-15 PROCEDURE — G8420 CALC BMI NORM PARAMETERS: HCPCS | Performed by: OBSTETRICS & GYNECOLOGY

## 2022-03-15 PROCEDURE — G8432 DEP SCR NOT DOC, RNG: HCPCS | Performed by: OBSTETRICS & GYNECOLOGY

## 2022-03-15 PROCEDURE — 3017F COLORECTAL CA SCREEN DOC REV: CPT | Performed by: OBSTETRICS & GYNECOLOGY

## 2022-03-15 PROCEDURE — G8536 NO DOC ELDER MAL SCRN: HCPCS | Performed by: OBSTETRICS & GYNECOLOGY

## 2022-03-15 PROCEDURE — G0463 HOSPITAL OUTPT CLINIC VISIT: HCPCS | Performed by: OBSTETRICS & GYNECOLOGY

## 2022-03-15 PROCEDURE — 1101F PT FALLS ASSESS-DOCD LE1/YR: CPT | Performed by: OBSTETRICS & GYNECOLOGY

## 2022-03-15 PROCEDURE — 99213 OFFICE O/P EST LOW 20 MIN: CPT | Performed by: OBSTETRICS & GYNECOLOGY

## 2022-03-15 PROCEDURE — G8399 PT W/DXA RESULTS DOCUMENT: HCPCS | Performed by: OBSTETRICS & GYNECOLOGY

## 2022-03-15 NOTE — PROGRESS NOTES
[address] [place] [address] Mathias Moritz [address], [address] [facility]  P [phone]  F [phone]    Office Note  Patient ID:  Name:  Preet Nichole  MRN:  [id number]  :  1950/72 y.o. Date:  3/15/2022      HISTORY OF PRESENT ILLNESS:  Preet Nichole is a 74 y.o.  postmenopausal female who is an established patient with a diagnosis of stage IA, grade 3 endometrial cancer. She underwent a robotic assisted TLH, BSO, SPLND in 2019. She had minimal invasion, no LVSI, and negative washings. I did not recommend any adjuvant therapy, though I did discuss her risks of recurrence. She underwent genetic testing and had a VUS of the SMAD4 gene, while negative for any other mutations. She did have a second opinion at [facility] with Dr. Barbara Perez, and ultimately underwent vaginal cuff brachytherapy there, which she completed in 2020. She presented in 2020 for follow-up. She stated that she was enrolled in a clinical trial at [facility] on the effects of the use of vaginal dilators. She was last seen there a few months prior. She was without complaints. She denied any vaginal bleeding outside of when she uses the dilator, but that is not every time. She denied pelvic or abdominal pain. On pelvic exam I noted a small friable nodular area on the left anterolateral vagina measuring about 1.5 cm. I performed a biopsy of this suspicious lesion. The mass was almost removed completely with the biopsy. FINAL PATHOLOGIC DIAGNOSIS   Left upper vaginal wall, biopsy:   Metastatic adenocarcinoma, compatible with endometrial primary (see comment)   Comment   Immunochemical stains are performed. Tumor cells are positive for ER, ND, and p16 (patchy) while negative for CEA and Napsin A. Given the patient's history, morphologic findings, and immunohistochemical staining pattern the above diagnosis is rendered.      I ordered a CT of the chest/abdomen/pelvis to evaluate. It did not demonstrate any other disease. I recommended radiation therapy and suggested that she follow-up at [address] [place] [address] with her radiation oncologist there, Dr. Anisa Benavidez. She presented subsequently to discuss treatment options, as she was not enthusiastic about pelvic radiation therapy. She also stated she had another opinion scheduled with Dr. Kenia Granado at [facility] for the following day. He ordered a PET/CT that showed no evidence of disease outside of the pelvis. He essentially told her the same thing and recommended pelvic radiation therapy. Her referred her to Dr. Jasmin Griffin with [facility] for treatment. Following those consultation she went back to see Dr. Amor Pardo at [address] [place] [address], as well as Dr. Vale Lares at [address] [place] [address], for additional consultation. She stated that she and Dr. Arcos Fear \"didn't mesh\". She presented to see me in January 2021 to ask my opinion on treatment and for my recommendations. I again explained to her that radiation therapy would be the standard of care. Surgical excision of the area might be an option, but still would not eliminate the need for radiation. I again explained that I didn't think chemotherapy, immunotherapy, or hormonal therapy would be good options, especially with radiation likely being a curative treatment. I was still not sure why she was so opposed to radiation and I again tried to alleviate her concerns regarding the treatment. I stressed that she needed to get started on therapy as soon as possible, as we had known about this recurrence since late September. I stressed that every day she delayed treatment she was decreasing her chances at salvage. She had another PET/CT in February 2021 at [address] [place] [address] prior to her radiation, which was reportedly negative, though I don't have a copy of those results. She ultimately completed pelvic radiation therapy at [facility] [place] [facility] with Dr. Amor Pardo, which she completed in April 2021.       She was seen in December 2021 for surveillance and to review her latest CT scan. She was last seen in the office in August to review her CT from [facility], performed on 8/9/21, which was negative. There was also no evidence of recurrent or metastatic disease on her last scan. She presented in February 2022 complaining of vaginal discharge. She continues to complain of a vaginal discharge. ROS:   and GI review:  Negative  Cardiopulmonary review:  Negative   Musculoskeletal:  Negative    A comprehensive review of systems was negative except for that written in the History of Present Illness. , 10 point ROS      Problem List:  Patient Active Problem List    Diagnosis Date Noted    Endometrial cancer ([facility] 11/27/2019    Sinus bradycardia by electrocardiogram 11/21/2019    Pseudophakia of both eyes 05/29/2018    White coat syndrome without diagnosis of hypertension 08/07/2017    Living will, counseling/discussion 08/07/2017    Primary open angle glaucoma (POAG) 12/19/2016    Nuclear nonsenile cataract 12/19/2016    Vaccination refused by patient 10/06/2016    Colonoscopy refused 10/06/2016    Primary insomnia 10/06/2016    Glaucoma     Degenerative joint disease (DJD) of lumbar spine     GERD (gastroesophageal reflux disease)     Rheumatic fever     PUD (peptic ulcer disease)      PMH:  Past Medical History:   Diagnosis Date    BCC (basal cell carcinoma)     Cancer ([facility] 10/2019    UTERINE    Degenerative joint disease (DJD) of lumbar spine     GERD (gastroesophageal reflux disease)     Glaucoma     Headache     Nausea & vomiting     PUD (peptic ulcer disease)     \"MANY YEARS AGO\"    Rheumatic fever     AS CHILD    Sinus bradycardia by electrocardiogram 11/21/2019      PSH:  Past Surgical History:   Procedure Laterality Date    HX COLPOSCOPY      abn pap    HX HEENT      SHAMA CATARACTS    HX HIP REPLACEMENT      x2    HX HYSTERECTOMY  11/27/2019    Dr. Alex Turk  HX OTHER SURGICAL      DOG BIT SURGERY ON RIGHT HAND    HX WISDOM TEETH EXTRACTION        Social History:  Social History     Tobacco Use    Smoking status: Former Smoker     Packs/day: 0.50     Years: 20.00     Pack years: 10.00     Quit date: 10/6/1978     Years since quittin.4    Smokeless tobacco: Never Used    Tobacco comment: SOMOKE OFF AND ON   Substance Use Topics    Alcohol use: Yes     Comment: RARELY      Family History:  Family History   Problem Relation Age of Onset    Cancer Mother         lung    OSTEOARTHRITIS Mother     Heart Disease Father         pacer    OSTEOARTHRITIS Father     Cancer Father         SKIN - basal cell    Pacemaker Father     Heart Surgery Father         STENT    Colon Polyps Father         no firm diagnosis of any cancer    Diabetes Sister     Other Sister         \"SLIGHT MENTAL RETARDATION\" - aphasic    Colon Polyps Sister         hx of polyps, uncertain of cancer    Other Sister         \"GUILLIAN BARRE\"    Breast Cancer Sister 76    Cancer Sister 58        endometrial    No Known Problems Daughter         born imperforate anus    Anesth Problems Neg Hx       Medications: (reviewed)  Current Outpatient Medications   Medication Sig    triamcinolone acetonide (KENALOG) 0.1 % topical cream Apply  to affected area two (2) times a day. use thin layer    latanoprost (XALATAN) 0.005 % ophthalmic solution Administer 1 Drop to both eyes nightly.  calcium carbonate (TUMS) 200 mg calcium (500 mg) chew Take 1 Tab by mouth daily.  psyllium husk (METAMUCIL PO) Take 1 Tab by mouth nightly.  vit A/vit C/vit E/zinc/copper (PRESERVISION AREDS PO) Take 1 Tab by mouth every fourty-eight (48) hours.  naproxen sodium (ALEVE) 220 mg cap Take  by mouth daily as needed.  SF 5000 PLUS 1.1 % crea TOOTHPASTE    timolol (TIMOPTIC-XE) 0.25 % ophthalmic gel-forming Administer 1 Drop to both eyes daily.     Omega-3 Fatty Acids 300 mg cap Take 3 Tabs by mouth daily.    multivitamin (ONE A DAY) tablet Take 1 Tab by mouth daily. OR AREDS    cholecalciferol (VITAMIN D3) 1,000 unit tablet Take 1,000 Units by mouth daily. No current facility-administered medications for this visit. Allergies: (reviewed)  Allergies   Allergen Reactions    Shellfish Containing Products Diarrhea    Aspirin Other (comments)     heartburn    Penicillins Rash     Had PCN many years ago and had a rash. Has had it once since then with no reaction. OBJECTIVE:    Physical Exam:  VITAL SIGNS: Vitals:    03/15/22 0942   BP: (!) 132/90   Pulse: (!) 54   Weight: 158 lb 3.2 oz (71.8 kg)   Height: 5' 7.01\" (1.702 m)     Body mass index is 24.77 kg/m². GENERAL SHAMEKA: WD, WN, WF in NAD   HEENT: WNL   RESPIRATORY: CTA   CARDIOVASC: RRR   GASTROINT: Soft, NT, ND   MUSCULOSKEL: WNL   EXTREMITIES: No edema   PELVIC: Normal external genitalia. Atrophic and radiation-related changes noted in the vagina. No visible lesions. No masses. Uterus, cervix, and adnexa surgically absent. RECTAL: Deferred   EDEL SURVEY: Negative   NEURO: Grossly intact       Lab Data:    Lab Results   Component Value Date/Time    WBC 12.7 (H) 11/28/2019 04:16 AM    HGB 11.3 (L) 11/28/2019 04:16 AM    HCT 34.7 (L) 11/28/2019 04:16 AM    PLATELET 997 04/75/4044 04:16 AM    MCV 94.3 11/28/2019 04:16 AM     Lab Results   Component Value Date/Time    Sodium 134 (L) 11/28/2019 04:16 AM    Potassium 3.8 11/28/2019 04:16 AM    Chloride 102 11/28/2019 04:16 AM    CO2 27 11/28/2019 04:16 AM    Anion gap 5 11/28/2019 04:16 AM    Glucose 154 (H) 11/28/2019 04:16 AM    BUN 14 11/28/2019 04:16 AM    Creatinine 1.05 (H) 11/28/2019 04:16 AM    BUN/Creatinine ratio 13 11/28/2019 04:16 AM    GFR est AA >60 11/28/2019 04:16 AM    GFR est non-AA 52 (L) 11/28/2019 04:16 AM    Calcium 8.4 (L) 11/28/2019 04:16 AM         CT of chest/abdomen/pelvis (10/3/20)  Chest:  Lungs: There is a calcified granuloma within the right lower lobe. Lungs are  otherwise clear.     Lymph nodes: There is no mediastinal, hilar or axillary lymphadenopathy. There  are several calcified mediastinal and right hilar lymph nodes.     Pleura: There is no prior study for direct comparison.     Heart: The heart is normal in size and there is no pericardial fluid.     Bones: No lytic or sclerotic osseous lesion is visualized.     Abdomen/pelvis:  Liver: There is diffuse fatty infiltration of the liver. There is no suspicious  focal hepatic lesion.     Spleen: The spleen is normal.     Pancreas: The pancreas is normal.     Adrenals: The adrenals are normal.     Gallbladder: There is a gallstone in the gallbladder; there is no CT evidence of  acute cholecystitis.      Kidneys: There is a 2 mm non-obstructing left renal calculus. There are 2  subcentimeter left renal hypodensities, too small to further characterize, but  statistically likely to represent cysts. Note is made of a left retroaortic  renal vein.     Lymph nodes\omentum\peritoneum. There is no marisa hepatis, mesenteric,  retroperitoneal or pelvic lymphadenopathy. No intra-abdominal soft tissue nodule  or mass is seen.     Bowel: No dilated or thickened loop of large or small bowel is seen.     Urinary bladder: Urinary bladder is partially filled and grossly normal.     Bones: There is beam hardening artifact in the pelvis related to bilateral hip  prostheses. No lytic or sclerotic osseous lesion is visualized.     Miscellaneous: There is no free intraperitoneal gas or fluid. There is no focal  fluid collection to suggest abscess. Uterus is absent. The left and right  ovaries are not visualized, however there are two adjacent cysts within the left  hemipelvis, the larger of the two measures 2.3 cm x 2.2 cm. IMPRESSION: No evidence of metastatic disease within the chest, abdomen or  pelvis. Two adjacent cysts within the left hemipelvis, arising from the left  ovary.       PET/CT (1/27/21) - [facility]  Head and Neck: Physiologic radiotracer uptake is present within imaged portion of brain, adenoids, tonsils, salivary glands, and vocal cords. No hypermetabolic cervical adenopathy. There is increased radiotracer uptake in the extraocular muscles and tongue, likely due to activation. Thyroid gland is unremarkable. Chest: Heart size is normal. No pericardial effusion. Physiologic uptake is seen in the myocardium. Multiple calcified mediastinal and right hilar lymph nodes likely from prior granulomatous insult. No axillary adenopathy. Central airways are patent. No hypermetabolic lung parenchymal lesions. No focal consolidation. No pleural effusion or pneumothorax. Abdomen and Pelvis: There is physiologic FDG distribution with uniform uptake within liver and spleen. Large peripherally calcified gallstone in the body of the gallbladder. Pancreas is atrophic. No adrenal mass. Tracer excretion can be visualized in bilateral kidneys, ureters, and urinary bladder. Extensive streak artifact from bilateral hip arthroplasties limits assessment of pelvic structures. Status post WESLY and BSO. There is a hypermetabolic focus in the vaginal cuff with a max SUV of 7.0 (image 197). Redemonstrated photopenic bilobed cystic structure adjacent to the left external iliac vessels measuring 1.5 x 2.0 cm (image 162). There is a small to moderate-sized sliding-type hiatal hernia with layering debris in the lower esophagus and increased FDG uptake, max SUV 4.3 (image 67). Tracer distribution within the stomach and bowel appears. Physiologic. No hypermetabolic abdominopelvic lymphadenopathy. Bones and Soft Tissues: No suspicious focal abnormalities are identified within visualized osseous structures. Status post bilateral total hip arthroplasties. Multilevel degenerative changes of the visualized spine with chronic appearing compression deformity of the L1 vertebral body. Impression and Comments:   1.  Final report.    2.  Limited assessment of the pelvic structures secondary to extensive streak artifact from the bilateral hip arthroplasties. 3.  Status post WESLY and BSO. There is focal increased FDG uptake in the right vaginal cuff which likely corresponds with area of known recurrence. 4.  No definite evidence of distant metastatic disease. 5.  Cystic structure adjacent to the left external iliac vessels does not demonstrate increased FDG uptake. Consider further evaluation with a pelvic ultrasound to better characterize. 6.  Small to moderate-sized sliding-type hiatal hernia with increased metabolic activity noted in the lower esophagus suggestive of reflux esophagitis. 7.  Stable appearance of multiple calcified mediastinal and hilar lymph nodes suggestive of prior granulomatous insult. CT of abdomen/pelvis (8/9/21) - [facility]  Lower thorax: Limited imaging of the lung bases showed no evidence of airspace disease, nodule, or pleural effusion. The heart size was normal. See chest CT report for complete chest findings. Liver: Normal size. Peripheral 5 mm hypodensity inferior right hepatic lobe, too small to characterize. Ill-defined 18 x 16 mm area of hypodensity adjacent to falciform ligament. May represent fatty change along falciform ligament however more prominent than on prior study still favor focal fat however metastatic lesion not excluded. MR would clarify. . No other focal lesions seen. Biliary tract: Large rim calcified 2.1 cm gallstone. No evidence of acute cholecystitis or dilated ducts. Pancreas: Normal size and contour. No dilated ducts. Spleen: Normal size. No defects. Adrenal glands: Normal.     Kidneys: 2.9 mm nonobstructing calculus left kidney, 13 mm cyst left kidney as well as subcentimeter hypodensities. Right kidney normal. No hydronephrosis or mass. Vascular structures: Scattered calcifications throughout abdominal aorta. No aneurysm. Major branch vessels patent.  Retroaortic left renal vein, anatomic variant. No calcifications and left common iliac artery with punctate calcification right common iliac artery. No calcifications and remainder of the iliac arteries which are patent bilaterally. Peritoneum and retroperitoneum: No evidence of free air or free fluid. Borderline enlarged celiac axis lymph node with no periaortic, mesenteric, or pelvic lymphadenopathy. GI tract: Small sliding hiatal hernia. Stomach otherwise normal. Duodenum and small bowel have a normal appearance. Sigmoid diverticulosis without diverticulitis. Remainder of colon has a normal appearance. Appendix identified and normal.     Pelvis: Artifact over pelvis from hip replacements. Status post hysterectomy. Bladder distended and appears to be normal. Bilobular left adnexal mass measuring 4.9 x 2.2 cm, similar to prior. Bones and soft tissues: Bilateral total hip replacements. Artifact and pelvis from prostheses. Prostheses appear to be in satisfactory position. Degenerative changes of both acetabula and both sacroiliac joints. Degenerative disc disease L5-S1 and L4-L5 as well as L2-L3. Compression fracture L1, unchanged. Scoliosis. No suspicious bony lesion. CONCLUSIONS:   1. Status post hysterectomy Artifact over lower pelvis in area of vaginal cuff excludes complete evaluation of this area. No definite pelvic mass. Bilobular cystic left adnexal mass. Stable compared to prior. Follow-up with ultrasound. 2. Ill-defined 18 x 16 mm hypodensity adjacent to falciform ligament in liver. May be area of focal fat, favor, however more distinct and slightly larger on this study. Metastatic focus can't be excluded. MR would clarify. 3. Gallstone without acute cholecystectomy   4. No adenopathy. 5. Left renal cysts, stable. 6. Diverticulosis without diverticulitis. 7. Bilateral total hip replacements.  Degenerative changes lumbosacral spine with altered level degenerative disc disease, compression fracture, chronic, L1 and scoliosis. CT of abdomen/pelvis (12/10/21) - [facility]  Lower thorax: Limited imaging of the lung bases showed no evidence of airspace disease, nodule, or pleural effusion. The heart size was normal. See chest CT report for complete chest findings. Liver: Normal size. Peripheral 5 mm hypodensity inferior right hepatic lobe, too small to characterize. Ill-defined 18 x 16 mm area of hypodensity adjacent to falciform ligament unchanged, most likely representing focal fatty infiltration. No other focal lesions seen. Biliary tract: Large rim calcified 1.5 cm gallstone. No evidence of acute cholecystitis or dilated ducts. Pancreas: Normal size and contour. No dilated ducts. Spleen: Normal size. No defects. Adrenal glands: Normal.     Kidneys: 2.9 mm nonobstructing calculus left kidney, 13 mm cyst left kidney as well as subcentimeter hypodensities. Right kidney normal. No hydronephrosis or mass. Vascular structures: Scattered calcifications throughout abdominal aorta. No aneurysm. Major branch vessels patent. Retroaortic left renal vein, anatomic variant. No calcifications and left common iliac artery with punctate calcification right common iliac artery. No calcifications and remainder of the iliac arteries which are patent bilaterally. Peritoneum and retroperitoneum: No evidence of free air or free fluid. No enlarged lymph nodes in the abdomen or in the pelvis. GI tract: Small sliding hiatal hernia. Stomach otherwise normal. Duodenum and small bowel have a normal appearance. Sigmoid diverticulosis without diverticulitis. Remainder of colon has a normal appearance. Appendix identified and normal.     Status post hysterectomy. Bladder is under distended and appears to be normal. Bilobular left adnexal cystic mass measuring 4.9 x 2.2 cm on image #53 of series 7, similar to prior. Bones and soft tissues: Bilateral total hip replacements.  Prostheses appear to be in satisfactory position. Degenerative changes of both acetabula and both sacroiliac joints. Degenerative disc disease L5-S1 and L4-L5 as well as L2-L3. Compression fracture L1, unchanged. Scoliosis. No suspicious bony lesion. CONCLUSIONS:   1.  No definite evidence of tumor recurrence in the pelvis or metastasis to the abdomen or to the pelvis. 2.  Status post hysterectomy. Stable cystic mass in the left adnexa. 3.  Stable ill-defined hypodensity at adjacent to the falciform ligament of the liver most likely representing focal fatty infiltration. 4. Gallstone without acute cholecystectomy   5. No abdominal or pelvic free fluid or lymphadenopathy   6. Bilateral total hip replacements. Degenerative changes lumbosacral spine with altered level degenerative disc disease, compression fracture, chronic, L1 and scoliosis. IMPRESSION/PLAN:  Ankur Stone is a 74 y.o. female with a diagnosis of stage IA, grade 3, endometrial carcinoma. Following her RATLH, BSO, SPLND, she received vaginal brachytherapy at [facility]. She subsequently developed an isolated vaginal cuff recurrence in September 2020. She was recommended pelvic radiation therapy by myself and multiple other consulting physicians. She ultimately completed whole pelvic radiation therapy at [facility]. She had no evidence of disease on her most recent CT done at [facility]. She has no evidence of disease on today's exam.  I will plan on seeing her back in 3 months. I do not see a need to repeat imaging prior to that visit, unless symptoms dictate otherwise. An electronic signature was used to sign this note.     Saritha Palafox MD  03/15/22

## 2022-03-18 PROBLEM — R03.0 WHITE COAT SYNDROME WITHOUT DIAGNOSIS OF HYPERTENSION: Status: ACTIVE | Noted: 2017-08-07

## 2022-03-19 PROBLEM — R00.1 SINUS BRADYCARDIA BY ELECTROCARDIOGRAM: Status: ACTIVE | Noted: 2019-11-21

## 2022-03-19 PROBLEM — Z96.1 PSEUDOPHAKIA OF BOTH EYES: Status: ACTIVE | Noted: 2018-05-29

## 2022-03-19 PROBLEM — C54.1 ENDOMETRIAL CANCER (HCC): Status: ACTIVE | Noted: 2019-11-27

## 2022-03-20 PROBLEM — Z71.89 LIVING WILL, COUNSELING/DISCUSSION: Status: ACTIVE | Noted: 2017-08-07

## 2022-06-06 ENCOUNTER — TELEPHONE (OUTPATIENT)
Dept: INTERNAL MEDICINE CLINIC | Age: 72
End: 2022-06-06

## 2022-06-07 NOTE — TELEPHONE ENCOUNTER
I haven't ever met this patient. She needs to wait until the time of our visit. Please make sure I have 30 minutes for that visit.

## 2022-06-15 NOTE — PROGRESS NOTES
Three month check up for history of endometrial cancer. Pt states no abnormal spotting, bleeding or pain. 1. Have you been to the ER, urgent care clinic since your last visit? Hospitalized since your last visit? No    2. Have you seen or consulted any other health care providers outside of the [address] [place] [address] since your last visit? Include any pap smears or colon screening.  No

## 2022-06-16 ENCOUNTER — OFFICE VISIT (OUTPATIENT)
Dept: GYNECOLOGY | Age: 72
End: 2022-06-16
Payer: MEDICARE

## 2022-06-16 VITALS
DIASTOLIC BLOOD PRESSURE: 89 MMHG | SYSTOLIC BLOOD PRESSURE: 152 MMHG | HEART RATE: 69 BPM | WEIGHT: 158 LBS | BODY MASS INDEX: 24.8 KG/M2 | HEIGHT: 67 IN

## 2022-06-16 DIAGNOSIS — C54.1 ENDOMETRIAL CANCER (HCC): Primary | ICD-10-CM

## 2022-06-16 PROCEDURE — G8427 DOCREV CUR MEDS BY ELIG CLIN: HCPCS | Performed by: OBSTETRICS & GYNECOLOGY

## 2022-06-16 PROCEDURE — G8420 CALC BMI NORM PARAMETERS: HCPCS | Performed by: OBSTETRICS & GYNECOLOGY

## 2022-06-16 PROCEDURE — 99213 OFFICE O/P EST LOW 20 MIN: CPT | Performed by: OBSTETRICS & GYNECOLOGY

## 2022-06-16 PROCEDURE — 1123F ACP DISCUSS/DSCN MKR DOCD: CPT | Performed by: OBSTETRICS & GYNECOLOGY

## 2022-06-16 PROCEDURE — 1101F PT FALLS ASSESS-DOCD LE1/YR: CPT | Performed by: OBSTETRICS & GYNECOLOGY

## 2022-06-16 PROCEDURE — G0463 HOSPITAL OUTPT CLINIC VISIT: HCPCS | Performed by: OBSTETRICS & GYNECOLOGY

## 2022-06-16 PROCEDURE — G8536 NO DOC ELDER MAL SCRN: HCPCS | Performed by: OBSTETRICS & GYNECOLOGY

## 2022-06-16 PROCEDURE — G8432 DEP SCR NOT DOC, RNG: HCPCS | Performed by: OBSTETRICS & GYNECOLOGY

## 2022-06-16 PROCEDURE — 3017F COLORECTAL CA SCREEN DOC REV: CPT | Performed by: OBSTETRICS & GYNECOLOGY

## 2022-06-16 PROCEDURE — 1090F PRES/ABSN URINE INCON ASSESS: CPT | Performed by: OBSTETRICS & GYNECOLOGY

## 2022-06-16 PROCEDURE — G8399 PT W/DXA RESULTS DOCUMENT: HCPCS | Performed by: OBSTETRICS & GYNECOLOGY

## 2022-06-16 NOTE — PROGRESS NOTES
[facility] [address], [address]  P [phone]  F [phone]    Office Note  Patient ID:  Name:  Amelia Mcdonald  MRN:  [id number]  :  1950/72 y.o. Date:  2022      HISTORY OF PRESENT ILLNESS:  Amelia Mcdonald is a 74 y.o.  postmenopausal female who is an established patient with a diagnosis of stage IA, grade 3 endometrial cancer. She underwent a robotic assisted TLH, BSO, SPLND in 2019. She had minimal invasion, no LVSI, and negative washings. I did not recommend any adjuvant therapy, though I did discuss her risks of recurrence. She underwent genetic testing and had a VUS of the SMAD4 gene, while negative for any other mutations. She did have a second opinion at [facility] with Dr. Rhonda Hernandez, and ultimately underwent vaginal cuff brachytherapy there, which she completed in 2020. She presented in 2020 for follow-up. She stated that she was enrolled in a clinical trial at [facility] on the effects of the use of vaginal dilators. She was last seen there a few months prior. She was without complaints. She denied any vaginal bleeding outside of when she uses the dilator, but that is not every time. She denied pelvic or abdominal pain. On pelvic exam I noted a small friable nodular area on the left anterolateral vagina measuring about 1.5 cm. I performed a biopsy of this suspicious lesion. The mass was almost removed completely with the biopsy. FINAL PATHOLOGIC DIAGNOSIS   Left upper vaginal wall, biopsy:   Metastatic adenocarcinoma, compatible with endometrial primary (see comment)   Comment   Immunochemical stains are performed. Tumor cells are positive for ER, AZ, and p16 (patchy) while negative for CEA and Napsin A. Given the patient's history, morphologic findings, and immunohistochemical staining pattern the above diagnosis is rendered.      I ordered a CT of the chest/abdomen/pelvis to evaluate. It did not demonstrate any other disease. I recommended radiation therapy and suggested that she follow-up at [facility] with her radiation oncologist there, Dr. Pat Melissa. She presented subsequently to discuss treatment options, as she was not enthusiastic about pelvic radiation therapy. She also stated she had another opinion scheduled with Dr. Alejandra Castaneda at [facility] for the following day. He ordered a PET/CT that showed no evidence of disease outside of the pelvis. He essentially told her the same thing and recommended pelvic radiation therapy. Her referred her to Dr. Wili Graham with [facility] for treatment. Following those consultation she went back to see Dr. Shameka Alvarado at [facility], as well as Dr. Víctor Murry at [facility], for additional consultation. She stated that she and Dr. Modesta Freeman \"didn't mesh\". She presented to see me in January 2021 to ask my opinion on treatment and for my recommendations. I again explained to her that radiation therapy would be the standard of care. Surgical excision of the area might be an option, but still would not eliminate the need for radiation. I again explained that I didn't think chemotherapy, immunotherapy, or hormonal therapy would be good options, especially with radiation likely being a curative treatment. I was still not sure why she was so opposed to radiation and I again tried to alleviate her concerns regarding the treatment. I stressed that she needed to get started on therapy as soon as possible, as we had known about this recurrence since late September. I stressed that every day she delayed treatment she was decreasing her chances at salvage. She had another PET/CT in February 2021 at [facility] prior to her radiation, which was reportedly negative, though I don't have a copy of those results. She ultimately completed pelvic radiation therapy at [facility] with Dr. Shameka Alvarado, which she completed in April 2021. She presents today for follow-up. I last saw her in March 2022. Her exam at that time was negative. Her last scan was in December 2021, which was negative. She reports some back pain, as well as some radiculopathy symptoms in both legs. ROS:   and GI review:  Negative  Cardiopulmonary review:  Negative   Musculoskeletal:  Negative    A comprehensive review of systems was negative except for that written in the History of Present Illness. , 10 point ROS      Problem List:  Patient Active Problem List    Diagnosis Date Noted    [facility] 11[facility]/2019    Sinus bradycardia by electrocardiogram 11/21/2019    Pseudophakia of both eyes 05/29/2018    White coat syndrome without diagnosis of hypertension 08/07/2017    Living will, counseling/discussion 08/07/2017    Primary open angle glaucoma (POAG) 12/19/2016    Nuclear nonsenile cataract 12/19/2016    Vaccination refused by patient 10/06/2016    Colonoscopy refused 10/06/2016    Primary insomnia 10/06/2016    Glaucoma     Degenerative joint disease (DJD) of lumbar spine     GERD (gastroesophageal reflux disease)     Rheumatic fever     PUD (peptic ulcer disease)      PMH:  Past Medical History:   Diagnosis Date    BCC (basal cell carcinoma)     Cancer ([facility] 10/2019    UTERINE    Degenerative joint disease (DJD) of lumbar spine     GERD (gastroesophageal reflux disease)     Glaucoma     Headache     Nausea & vomiting     PUD (peptic ulcer disease)     \"MANY YEARS AGO\"    Rheumatic fever     AS CHILD    Sinus bradycardia by electrocardiogram 11/21/2019      PSH:  Past Surgical History:   Procedure Laterality Date    HX COLPOSCOPY      abn pap    HX HEENT      SHAMA CATARACTS    HX HIP REPLACEMENT      x2    HX HYSTERECTOMY  11/27/2019    Dr. Bouchra Munroe HX ORTHOPAEDIC      LEFT ELBOW FOR FX X2    HX OTHER SURGICAL      DOG BIT SURGERY ON RIGHT HAND    HX WISDOM TEETH EXTRACTION        Social History:  Social History     Tobacco Use    Smoking status: Former Smoker     Packs/day: 0.50     Years: 20.00     Pack years: 10.00     Quit date: 10/6/1978     Years since quittin.7    Smokeless tobacco: Never Used    Tobacco comment: SOMOKE OFF AND ON   Substance Use Topics    Alcohol use: Yes     Comment: RARELY      Family History:  Family History   Problem Relation Age of Onset    Cancer Mother         lung    OSTEOARTHRITIS Mother     Heart Disease Father         pacer    OSTEOARTHRITIS Father     Cancer Father         SKIN - basal cell    Pacemaker Father     Heart Surgery Father         STENT    Colon Polyps Father         no firm diagnosis of any cancer    Diabetes Sister     Other Sister         \"SLIGHT MENTAL RETARDATION\" - aphasic    Colon Polyps Sister         hx of polyps, uncertain of cancer    Other Sister         \"GUILLIAN BARRE\"    Breast Cancer Sister 76    Cancer Sister 58        endometrial    No Known Problems Daughter         born imperforate anus    Anesth Problems Neg Hx       Medications: (reviewed)  Current Outpatient Medications   Medication Sig    triamcinolone acetonide (KENALOG) 0.1 % topical cream Apply  to affected area two (2) times a day. use thin layer    latanoprost (XALATAN) 0.005 % ophthalmic solution Administer 1 Drop to both eyes nightly.  calcium carbonate (TUMS) 200 mg calcium (500 mg) chew Take 1 Tab by mouth daily.  psyllium husk (METAMUCIL PO) Take 1 Tab by mouth nightly.  vit A/vit C/vit E/zinc/copper (PRESERVISION AREDS PO) Take 1 Tab by mouth every fourty-eight (48) hours.  naproxen sodium (ALEVE) 220 mg cap Take  by mouth daily as needed.  SF 5000 PLUS 1.1 % crea TOOTHPASTE    timolol (TIMOPTIC-XE) 0.25 % ophthalmic gel-forming Administer 1 Drop to both eyes daily.  Omega-3 Fatty Acids 300 mg cap Take 3 Tabs by mouth daily.  multivitamin (ONE A DAY) tablet Take 1 Tab by mouth daily.  OR AREDS    cholecalciferol (VITAMIN D3) 1,000 unit tablet Take 1,000 Units by mouth daily. No current facility-administered medications for this visit. Allergies: (reviewed)  Allergies   Allergen Reactions    Shellfish Containing Products Diarrhea    Aspirin Other (comments)     heartburn    Penicillins Rash     Had PCN many years ago and had a rash. Has had it once since then with no reaction. OBJECTIVE:    Physical Exam:  VITAL SIGNS: Vitals:    06/16/22 1108   BP: (!) 152/89   Pulse: 69   Weight: 158 lb (71.7 kg)   Height: 5' 7.01\" (1.702 m)     Body mass index is 24.74 kg/m². GENERAL SHAMEKA: WD, WN, WF in NAD   HEENT: WNL   RESPIRATORY: CTA   CARDIOVASC: RRR   GASTROINT: Soft, NT, ND   MUSCULOSKEL: WNL   EXTREMITIES: No edema   PELVIC: Normal external genitalia. Atrophic and radiation-related changes noted in the vagina. No visible lesions. No masses. Uterus, cervix, and adnexa surgically absent. RECTAL: Deferred   EDEL SURVEY: Negative   NEURO: Grossly intact       Lab Data:    Lab Results   Component Value Date/Time    WBC 12.7 (H) 11/28/2019 04:16 AM    HGB 11.3 (L) 11/28/2019 04:16 AM    HCT 34.7 (L) 11/28/2019 04:16 AM    PLATELET 461 82/45/7757 04:16 AM    MCV 94.3 11/28/2019 04:16 AM     Lab Results   Component Value Date/Time    Sodium 134 (L) 11/28/2019 04:16 AM    Potassium 3.8 11/28/2019 04:16 AM    Chloride 102 11/28/2019 04:16 AM    CO2 27 11/28/2019 04:16 AM    Anion gap 5 11/28/2019 04:16 AM    Glucose 154 (H) 11/28/2019 04:16 AM    BUN 14 11/28/2019 04:16 AM    Creatinine 1.05 (H) 11/28/2019 04:16 AM    BUN/Creatinine ratio 13 11/28/2019 04:16 AM    GFR est AA >60 11/28/2019 04:16 AM    GFR est non-AA 52 (L) 11/28/2019 04:16 AM    Calcium 8.4 (L) 11/28/2019 04:16 AM         CT of chest/abdomen/pelvis (10/3/20)  Chest:  Lungs: There is a calcified granuloma within the right lower lobe. Lungs are  otherwise clear.     Lymph nodes: There is no mediastinal, hilar or axillary lymphadenopathy.  There  are several calcified mediastinal and right hilar lymph nodes.     Pleura: There is no prior study for direct comparison.     Heart: The heart is normal in size and there is no pericardial fluid.     Bones: No lytic or sclerotic osseous lesion is visualized.     Abdomen/pelvis:  Liver: There is diffuse fatty infiltration of the liver. There is no suspicious  focal hepatic lesion.     Spleen: The spleen is normal.     Pancreas: The pancreas is normal.     Adrenals: The adrenals are normal.     Gallbladder: There is a gallstone in the gallbladder; there is no CT evidence of  acute cholecystitis.      Kidneys: There is a 2 mm non-obstructing left renal calculus. There are 2  subcentimeter left renal hypodensities, too small to further characterize, but  statistically likely to represent cysts. Note is made of a left retroaortic  renal vein.     Lymph nodes\omentum\peritoneum. There is no marisa hepatis, mesenteric,  retroperitoneal or pelvic lymphadenopathy. No intra-abdominal soft tissue nodule  or mass is seen.     Bowel: No dilated or thickened loop of large or small bowel is seen.     Urinary bladder: Urinary bladder is partially filled and grossly normal.     Bones: There is beam hardening artifact in the pelvis related to bilateral hip  prostheses. No lytic or sclerotic osseous lesion is visualized.     Miscellaneous: There is no free intraperitoneal gas or fluid. There is no focal  fluid collection to suggest abscess. Uterus is absent. The left and right  ovaries are not visualized, however there are two adjacent cysts within the left  hemipelvis, the larger of the two measures 2.3 cm x 2.2 cm. IMPRESSION: No evidence of metastatic disease within the chest, abdomen or  pelvis. Two adjacent cysts within the left hemipelvis, arising from the left  ovary. PET/CT (1/27/21) - [facility]  Head and Neck: Physiologic radiotracer uptake is present within imaged portion of brain, adenoids, tonsils, salivary glands, and vocal cords. No hypermetabolic cervical adenopathy. There is increased radiotracer uptake in the extraocular muscles and tongue, likely due to activation. Thyroid gland is unremarkable. Chest: Heart size is normal. No pericardial effusion. Physiologic uptake is seen in the myocardium. Multiple calcified mediastinal and right hilar lymph nodes likely from prior granulomatous insult. No axillary adenopathy. Central airways are patent. No hypermetabolic lung parenchymal lesions. No focal consolidation. No pleural effusion or pneumothorax. Abdomen and Pelvis: There is physiologic FDG distribution with uniform uptake within liver and spleen. Large peripherally calcified gallstone in the body of the gallbladder. Pancreas is atrophic. No adrenal mass. Tracer excretion can be visualized in bilateral kidneys, ureters, and urinary bladder. Extensive streak artifact from bilateral hip arthroplasties limits assessment of pelvic structures. Status post WSELY and BSO. There is a hypermetabolic focus in the vaginal cuff with a max SUV of 7.0 (image 197). Redemonstrated photopenic bilobed cystic structure adjacent to the left external iliac vessels measuring 1.5 x 2.0 cm (image 162). There is a small to moderate-sized sliding-type hiatal hernia with layering debris in the lower esophagus and increased FDG uptake, max SUV 4.3 (image 67). Tracer distribution within the stomach and bowel appears. Physiologic. No hypermetabolic abdominopelvic lymphadenopathy. Bones and Soft Tissues: No suspicious focal abnormalities are identified within visualized osseous structures. Status post bilateral total hip arthroplasties. Multilevel degenerative changes of the visualized spine with chronic appearing compression deformity of the L1 vertebral body. Impression and Comments:   1.  Final report. 2.  Limited assessment of the pelvic structures secondary to extensive streak artifact from the bilateral hip arthroplasties. 3.  Status post WESLY and BSO.  There is focal increased FDG uptake in the right vaginal cuff which likely corresponds with area of known recurrence. 4.  No definite evidence of distant metastatic disease. 5.  Cystic structure adjacent to the left external iliac vessels does not demonstrate increased FDG uptake. Consider further evaluation with a pelvic ultrasound to better characterize. 6.  Small to moderate-sized sliding-type hiatal hernia with increased metabolic activity noted in the lower esophagus suggestive of reflux esophagitis. 7.  Stable appearance of multiple calcified mediastinal and hilar lymph nodes suggestive of prior granulomatous insult. CT of abdomen/pelvis (8/9/21) - [facility]  Lower thorax: Limited imaging of the lung bases showed no evidence of airspace disease, nodule, or pleural effusion. The heart size was normal. See chest CT report for complete chest findings. Liver: Normal size. Peripheral 5 mm hypodensity inferior right hepatic lobe, too small to characterize. Ill-defined 18 x 16 mm area of hypodensity adjacent to falciform ligament. May represent fatty change along falciform ligament however more prominent than on prior study still favor focal fat however metastatic lesion not excluded. MR would clarify. . No other focal lesions seen. Biliary tract: Large rim calcified 2.1 cm gallstone. No evidence of acute cholecystitis or dilated ducts. Pancreas: Normal size and contour. No dilated ducts. Spleen: Normal size. No defects. Adrenal glands: Normal.     Kidneys: 2.9 mm nonobstructing calculus left kidney, 13 mm cyst left kidney as well as subcentimeter hypodensities. Right kidney normal. No hydronephrosis or mass. Vascular structures: Scattered calcifications throughout abdominal aorta. No aneurysm. Major branch vessels patent. Retroaortic left renal vein, anatomic variant. No calcifications and left common iliac artery with punctate calcification right common iliac artery.  No calcifications and remainder of the iliac arteries which are patent bilaterally. Peritoneum and retroperitoneum: No evidence of free air or free fluid. Borderline enlarged celiac axis lymph node with no periaortic, mesenteric, or pelvic lymphadenopathy. GI tract: Small sliding hiatal hernia. Stomach otherwise normal. Duodenum and small bowel have a normal appearance. Sigmoid diverticulosis without diverticulitis. Remainder of colon has a normal appearance. Appendix identified and normal.     Pelvis: Artifact over pelvis from hip replacements. Status post hysterectomy. Bladder distended and appears to be normal. Bilobular left adnexal mass measuring 4.9 x 2.2 cm, similar to prior. Bones and soft tissues: Bilateral total hip replacements. Artifact and pelvis from prostheses. Prostheses appear to be in satisfactory position. Degenerative changes of both acetabula and both sacroiliac joints. Degenerative disc disease L5-S1 and L4-L5 as well as L2-L3. Compression fracture L1, unchanged. Scoliosis. No suspicious bony lesion. CONCLUSIONS:   1. Status post hysterectomy Artifact over lower pelvis in area of vaginal cuff excludes complete evaluation of this area. No definite pelvic mass. Bilobular cystic left adnexal mass. Stable compared to prior. Follow-up with ultrasound. 2. Ill-defined 18 x 16 mm hypodensity adjacent to falciform ligament in liver. May be area of focal fat, favor, however more distinct and slightly larger on this study. Metastatic focus can't be excluded. MR would clarify. 3. Gallstone without acute cholecystectomy   4. No adenopathy. 5. Left renal cysts, stable. 6. Diverticulosis without diverticulitis. 7. Bilateral total hip replacements. Degenerative changes lumbosacral spine with altered level degenerative disc disease, compression fracture, chronic, L1 and scoliosis.        CT of abdomen/pelvis (12/10/21) - [facility]  Lower thorax: Limited imaging of the lung bases showed no evidence of airspace disease, nodule, or pleural effusion. The heart size was normal. See chest CT report for complete chest findings. Liver: Normal size. Peripheral 5 mm hypodensity inferior right hepatic lobe, too small to characterize. Ill-defined 18 x 16 mm area of hypodensity adjacent to falciform ligament unchanged, most likely representing focal fatty infiltration. No other focal lesions seen. Biliary tract: Large rim calcified 1.5 cm gallstone. No evidence of acute cholecystitis or dilated ducts. Pancreas: Normal size and contour. No dilated ducts. Spleen: Normal size. No defects. Adrenal glands: Normal.     Kidneys: 2.9 mm nonobstructing calculus left kidney, 13 mm cyst left kidney as well as subcentimeter hypodensities. Right kidney normal. No hydronephrosis or mass. Vascular structures: Scattered calcifications throughout abdominal aorta. No aneurysm. Major branch vessels patent. Retroaortic left renal vein, anatomic variant. No calcifications and left common iliac artery with punctate calcification right common iliac artery. No calcifications and remainder of the iliac arteries which are patent bilaterally. Peritoneum and retroperitoneum: No evidence of free air or free fluid. No enlarged lymph nodes in the abdomen or in the pelvis. GI tract: Small sliding hiatal hernia. Stomach otherwise normal. Duodenum and small bowel have a normal appearance. Sigmoid diverticulosis without diverticulitis. Remainder of colon has a normal appearance. Appendix identified and normal.     Status post hysterectomy. Bladder is under distended and appears to be normal. Bilobular left adnexal cystic mass measuring 4.9 x 2.2 cm on image #53 of series 7, similar to prior. Bones and soft tissues: Bilateral total hip replacements. Prostheses appear to be in satisfactory position. Degenerative changes of both acetabula and both sacroiliac joints. Degenerative disc disease L5-S1 and L4-L5 as well as L2-L3. Compression fracture L1, unchanged. Scoliosis. No suspicious bony lesion. CONCLUSIONS:   1.  No definite evidence of tumor recurrence in the pelvis or metastasis to the abdomen or to the pelvis. 2.  Status post hysterectomy. Stable cystic mass in the left adnexa. 3.  Stable ill-defined hypodensity at adjacent to the falciform ligament of the liver most likely representing focal fatty infiltration. 4. Gallstone without acute cholecystectomy   5. No abdominal or pelvic free fluid or lymphadenopathy   6. Bilateral total hip replacements. Degenerative changes lumbosacral spine with altered level degenerative disc disease, compression fracture, chronic, L1 and scoliosis. IMPRESSION/PLAN:  Gabriela De is a 74 y.o. female with a diagnosis of stage IA, grade 3, endometrial carcinoma. Following her RATLH, BSO, SPLND, she received vaginal brachytherapy at [facility]. She subsequently developed an isolated vaginal cuff recurrence in September 2020. She was recommended pelvic radiation therapy by myself and multiple other consulting physicians. She ultimately completed whole pelvic radiation therapy at [facility]. She had no evidence of disease on her most recent CT done at [facility]. She has no evidence of disease on today's exam.  I will plan on getting a CT of the abdomen/pelvis at this time. She may ultimately need an MRI to evaluate her radiculopathy symptoms. If the scan is negative, I will plan on seeing her back in 3 months. An electronic signature was used to sign this note.     Brisa Rodriguez MD  06/16/22

## 2022-06-17 ENCOUNTER — OFFICE VISIT (OUTPATIENT)
Dept: INTERNAL MEDICINE CLINIC | Age: 72
End: 2022-06-17
Payer: MEDICARE

## 2022-06-17 VITALS
OXYGEN SATURATION: 97 % | HEIGHT: 67 IN | WEIGHT: 158 LBS | SYSTOLIC BLOOD PRESSURE: 130 MMHG | TEMPERATURE: 98.1 F | BODY MASS INDEX: 24.8 KG/M2 | DIASTOLIC BLOOD PRESSURE: 80 MMHG | RESPIRATION RATE: 18 BRPM | HEART RATE: 59 BPM

## 2022-06-17 DIAGNOSIS — L23.9 ALLERGIC DERMATITIS: ICD-10-CM

## 2022-06-17 DIAGNOSIS — M54.50 CHRONIC BILATERAL LOW BACK PAIN, UNSPECIFIED WHETHER SCIATICA PRESENT: Primary | ICD-10-CM

## 2022-06-17 DIAGNOSIS — R03.0 WHITE COAT SYNDROME WITHOUT DIAGNOSIS OF HYPERTENSION: ICD-10-CM

## 2022-06-17 DIAGNOSIS — M79.2 NEURALGIA: ICD-10-CM

## 2022-06-17 DIAGNOSIS — M54.6 CHRONIC BILATERAL THORACIC BACK PAIN: ICD-10-CM

## 2022-06-17 DIAGNOSIS — M47.26 OSTEOARTHRITIS OF SPINE WITH RADICULOPATHY, LUMBAR REGION: ICD-10-CM

## 2022-06-17 DIAGNOSIS — Z78.0 ASYMPTOMATIC MENOPAUSAL STATE: ICD-10-CM

## 2022-06-17 DIAGNOSIS — Z85.42 HISTORY OF ENDOMETRIAL CANCER: ICD-10-CM

## 2022-06-17 DIAGNOSIS — H35.019 CHANGES IN VASCULAR APPEARANCE OF RETINA, UNSPECIFIED LATERALITY: ICD-10-CM

## 2022-06-17 DIAGNOSIS — E78.00 HYPERCHOLESTEREMIA: ICD-10-CM

## 2022-06-17 DIAGNOSIS — R53.83 FATIGUE, UNSPECIFIED TYPE: ICD-10-CM

## 2022-06-17 DIAGNOSIS — G89.29 CHRONIC BILATERAL LOW BACK PAIN, UNSPECIFIED WHETHER SCIATICA PRESENT: Primary | ICD-10-CM

## 2022-06-17 DIAGNOSIS — G89.29 CHRONIC BILATERAL THORACIC BACK PAIN: ICD-10-CM

## 2022-06-17 DIAGNOSIS — H40.10X0 OPEN-ANGLE GLAUCOMA OF RIGHT EYE, UNSPECIFIED GLAUCOMA STAGE, UNSPECIFIED OPEN-ANGLE GLAUCOMA TYPE: ICD-10-CM

## 2022-06-17 PROCEDURE — 1101F PT FALLS ASSESS-DOCD LE1/YR: CPT | Performed by: PHYSICIAN ASSISTANT

## 2022-06-17 PROCEDURE — G8420 CALC BMI NORM PARAMETERS: HCPCS | Performed by: PHYSICIAN ASSISTANT

## 2022-06-17 PROCEDURE — G8536 NO DOC ELDER MAL SCRN: HCPCS | Performed by: PHYSICIAN ASSISTANT

## 2022-06-17 PROCEDURE — G8427 DOCREV CUR MEDS BY ELIG CLIN: HCPCS | Performed by: PHYSICIAN ASSISTANT

## 2022-06-17 PROCEDURE — 3017F COLORECTAL CA SCREEN DOC REV: CPT | Performed by: PHYSICIAN ASSISTANT

## 2022-06-17 PROCEDURE — G8432 DEP SCR NOT DOC, RNG: HCPCS | Performed by: PHYSICIAN ASSISTANT

## 2022-06-17 PROCEDURE — 99214 OFFICE O/P EST MOD 30 MIN: CPT | Performed by: PHYSICIAN ASSISTANT

## 2022-06-17 PROCEDURE — G8399 PT W/DXA RESULTS DOCUMENT: HCPCS | Performed by: PHYSICIAN ASSISTANT

## 2022-06-17 PROCEDURE — 1090F PRES/ABSN URINE INCON ASSESS: CPT | Performed by: PHYSICIAN ASSISTANT

## 2022-06-17 PROCEDURE — G0463 HOSPITAL OUTPT CLINIC VISIT: HCPCS | Performed by: PHYSICIAN ASSISTANT

## 2022-06-17 RX ORDER — LORATADINE 10 MG/1
10 TABLET ORAL
COMMUNITY
Start: 2022-06-17 | End: 2022-07-18

## 2022-06-17 NOTE — PROGRESS NOTES
Chief Complaint   Patient presents with    Follow-up     Patient LOV with Count includes the [facility] with Romi about 2 yrs ago per patient. Patient was recently seen by opthalmologist at [facility] [place] and advise patient to have blood sugar check. Vitals:    06/17/22 0949   BP: (!) 144/61   Pulse: (!) 59   Resp: 18   Temp: 98.1 °F (36.7 °C)   TempSrc: Oral   SpO2: 97%   Weight: 158 lb (71.7 kg)   Height: 5' 7\" (1.702 m)   PainSc:   0 - No pain       Health Maintenance Due   Topic    Hepatitis C Screening     Depression Screen     Colorectal Cancer Screening Combo     Shingrix Vaccine Age 50> (1 of 2)    Pneumococcal 65+ years (1 - PCV)    Medicare Yearly Exam     Breast Cancer Screen Mammogram     COVID-19 Vaccine (2 - Moderna 3-dose series)    DTaP/Tdap/Td series (2 - Td or Tdap)       1. \"Have you been to the ER, urgent care clinic since your last visit? Hospitalized since your last visit? \" No    2. \"Have you seen or consulted any other health care providers outside of the [address] [place] [address] since your last visit? \" Yes opthamology, Dermatolgist, Dentist     3. For patients aged 39-70: Has the patient had a colonoscopy / FIT/ Cologuard? No      If the patient is female:    4. For patients aged 41-77: Has the patient had a mammogram within the past 2 years? No      5. For patients aged 21-65: Has the patient had a pap smear?  NA - based on age or sex

## 2022-06-17 NOTE — PROGRESS NOTES
Carri Hayes is a 74 y.o. female  Chief Complaint   Patient presents with    Follow-up     Visit Vitals  /80   Pulse (!) 59   Temp 98.1 °F (36.7 °C) (Oral)   Resp 18   Ht 5' 7\" (1.702 m)   Wt 158 lb (71.7 kg)   SpO2 97%   BMI 24.75 kg/m²      Health Maintenance Due   Topic Date Due    Hepatitis C Screening  Never done    Depression Screen  Never done    Colorectal Cancer Screening Combo  Never done    Shingrix Vaccine Age 50> (1 of 2) Never done    Pneumococcal 65+ years (1 - PCV) Never done    Medicare Yearly Exam  08/08/2018    Breast Cancer Screen Mammogram  11/06/2020    COVID-19 Vaccine (2 - Moderna 3-dose series) 03/31/2021    DTaP/Tdap/Td series (2 - Td or Tdap) 10/06/2021       HPI  Former patient of Jigar Cabello in to see me today to Establish Care. Pt has a long lost of concerns as she has not been seen in years. Hx White Coat HTN. BP at home: 130s/70s. Itchy back - saw derm but sx persist    Hx Glaucoma - Eye doctor would like pt's blood sugar checked    B feet have been numb - Dr. Diana Bruno doesn't believe it's radiation related. Questions if she may have DDD. Heart races first thing in the AM before using Beta blocker eye drops. Slows down after using drops. Cutting back on caffeine has helped. This never happens during exercise. Would like DEXA rechecked. ROS  Review of Systems   Constitutional: Negative for fever. Respiratory: Negative for shortness of breath. Cardiovascular: Negative for chest pain and palpitations. Skin: Positive for itching. Negative for rash. Neurological: Negative for dizziness, loss of consciousness and weakness. Psychiatric/Behavioral: Negative for depression. EXAM  Physical Exam  Vitals and nursing note reviewed. Constitutional:       General: She is not in acute distress. Appearance: She is well-developed. HENT:      Head: Normocephalic and atraumatic. Neck:      Vascular: No JVD.    Cardiovascular:      Rate and Rhythm: Normal rate and regular rhythm. Heart sounds: Normal heart sounds. Pulmonary:      Effort: Pulmonary effort is normal. No respiratory distress. Breath sounds: Normal breath sounds. Musculoskeletal:      Cervical back: Neck supple. Skin:     General: Skin is warm and dry. Comments: Mild excoriations noted upper back. No rash. Neurological:      Mental Status: She is alert and oriented to person, place, and time. Psychiatric:         Mood and Affect: Mood normal.         Behavior: Behavior normal.         Thought Content: Thought content normal.         Judgment: Judgment normal.       ASSESSMENT/PLAN  Encounter Diagnoses     ICD-10-CM ICD-9-CM   1. Chronic bilateral low back pain, unspecified whether sciatica present  M54.50 724.2    G89.29 338.29   2. Chronic bilateral thoracic back pain  M54.6 724.1    G89.29 338.29   3. Asymptomatic menopausal state  Z78.0 V49.81   4. White coat syndrome without diagnosis of hypertension  R03.0 796.2   5. Osteoarthritis of spine with radiculopathy, lumbar region  M47.26 721.3   6. Open-angle glaucoma of right eye, unspecified glaucoma stage, unspecified open-angle glaucoma type  H40.10X0 365.10     365.70   7. History of endometrial cancer  Z85.42 V10.42   8. Neuralgia  M79.2 729.2   9. Changes in vascular appearance of retina, unspecified laterality  H35.019 362.13   10. Fatigue, unspecified type  R53.83 780.79   11. Hypercholesteremia  E78.00 272.0   12.  Allergic dermatitis  L23.9 692.9     Orders Placed This Encounter    DEXA BONE DENSITY STUDY AXIAL    XR SPINE LUMB 2 OR 3 V    XR SPINE THORAC 3 V    METABOLIC PANEL, COMPREHENSIVE    TSH 3RD GENERATION    CBC WITH AUTOMATED DIFF    LIPID PANEL    REFERRAL TO PHYSICAL THERAPY    Start loratadine (Claritin) 10 mg tablet

## 2022-06-23 LAB
ALBUMIN SERPL-MCNC: 4.5 G/DL (ref 3.7–4.7)
ALBUMIN/GLOB SERPL: 2 {RATIO} (ref 1.2–2.2)
ALP SERPL-CCNC: 80 IU/L (ref 44–121)
ALT SERPL-CCNC: 16 IU/L (ref 0–32)
AST SERPL-CCNC: 17 IU/L (ref 0–40)
BASOPHILS # BLD AUTO: 0 X10E3/UL (ref 0–0.2)
BASOPHILS NFR BLD AUTO: 1 %
BILIRUB SERPL-MCNC: 0.8 MG/DL (ref 0–1.2)
BUN SERPL-MCNC: 14 MG/DL (ref 8–27)
BUN/CREAT SERPL: 17 (ref 12–28)
CALCIUM SERPL-MCNC: 10.2 MG/DL (ref 8.7–10.3)
CHLORIDE SERPL-SCNC: 99 MMOL/L (ref 96–106)
CHOLEST SERPL-MCNC: 215 MG/DL (ref 100–199)
CO2 SERPL-SCNC: 26 MMOL/L (ref 20–29)
CREAT SERPL-MCNC: 0.83 MG/DL (ref 0.57–1)
EGFR: 74 ML/MIN/1.73
EOSINOPHIL # BLD AUTO: 0.2 X10E3/UL (ref 0–0.4)
EOSINOPHIL NFR BLD AUTO: 3 %
ERYTHROCYTE [DISTWIDTH] IN BLOOD BY AUTOMATED COUNT: 13.1 % (ref 11.7–15.4)
GLOBULIN SER CALC-MCNC: 2.3 G/DL (ref 1.5–4.5)
GLUCOSE SERPL-MCNC: 94 MG/DL (ref 65–99)
HCT VFR BLD AUTO: 40.5 % (ref 34–46.6)
HDLC SERPL-MCNC: 63 MG/DL
HGB BLD-MCNC: 13.5 G/DL (ref 11.1–15.9)
IMM GRANULOCYTES # BLD AUTO: 0 X10E3/UL (ref 0–0.1)
IMM GRANULOCYTES NFR BLD AUTO: 0 %
IMP & REVIEW OF LAB RESULTS: NORMAL
LDLC SERPL CALC-MCNC: 129 MG/DL (ref 0–99)
LYMPHOCYTES # BLD AUTO: 0.4 X10E3/UL (ref 0.7–3.1)
LYMPHOCYTES NFR BLD AUTO: 10 %
MCH RBC QN AUTO: 31.2 PG (ref 26.6–33)
MCHC RBC AUTO-ENTMCNC: 33.3 G/DL (ref 31.5–35.7)
MCV RBC AUTO: 94 FL (ref 79–97)
MONOCYTES # BLD AUTO: 0.5 X10E3/UL (ref 0.1–0.9)
MONOCYTES NFR BLD AUTO: 10 %
NEUTROPHILS # BLD AUTO: 3.4 X10E3/UL (ref 1.4–7)
NEUTROPHILS NFR BLD AUTO: 76 %
PLATELET # BLD AUTO: 287 X10E3/UL (ref 150–450)
POTASSIUM SERPL-SCNC: 5 MMOL/L (ref 3.5–5.2)
PROT SERPL-MCNC: 6.8 G/DL (ref 6–8.5)
RBC # BLD AUTO: 4.33 X10E6/UL (ref 3.77–5.28)
SODIUM SERPL-SCNC: 138 MMOL/L (ref 134–144)
TRIGL SERPL-MCNC: 131 MG/DL (ref 0–149)
TSH SERPL DL<=0.005 MIU/L-ACNC: 1.62 UIU/ML (ref 0.45–4.5)
VLDLC SERPL CALC-MCNC: 23 MG/DL (ref 5–40)
WBC # BLD AUTO: 4.4 X10E3/UL (ref 3.4–10.8)

## 2022-06-30 ENCOUNTER — TELEPHONE (OUTPATIENT)
Dept: INTERNAL MEDICINE CLINIC | Age: 72
End: 2022-06-30

## 2022-06-30 NOTE — TELEPHONE ENCOUNTER
----- Message from Garlin Bence sent at 6/27/2022 11:16 AM EDT -----  Subject: Referral Request    QUESTIONS   Reason for referral request? 2 xrays of spine and bone density test   Has the physician seen you for this condition before? Yes  Select a date? 2022-06-17  Select the Provider the patient wants to be referred to, if known (PCP or   Specialist)? Alex Blackmon   Preferred Specialist (if applicable)? Do you already have an appointment scheduled? Yes  Select Scheduled Date? 2022-07-18  Select Scheduled Physician? Alex Blackmon   Additional Information for Provider? pt would like this done at [facility]   Fax number? [phone]  ---------------------------------------------------------------------------  --------------  Allean Billings INFO  What is the best way for the office to contact you? OK to leave message on   voicemail  Preferred Call Back Phone Number? [phone]  ---------------------------------------------------------------------------  --------------  SCRIPT ANSWERS  Relationship to Patient?  Self

## 2022-07-18 ENCOUNTER — OFFICE VISIT (OUTPATIENT)
Dept: INTERNAL MEDICINE CLINIC | Age: 72
End: 2022-07-18
Payer: MEDICARE

## 2022-07-18 VITALS
BODY MASS INDEX: 24.96 KG/M2 | HEIGHT: 67 IN | TEMPERATURE: 98.1 F | SYSTOLIC BLOOD PRESSURE: 140 MMHG | HEART RATE: 60 BPM | RESPIRATION RATE: 18 BRPM | WEIGHT: 159 LBS | OXYGEN SATURATION: 97 % | DIASTOLIC BLOOD PRESSURE: 70 MMHG

## 2022-07-18 DIAGNOSIS — G89.29 CHRONIC BILATERAL THORACIC BACK PAIN: ICD-10-CM

## 2022-07-18 DIAGNOSIS — M54.6 CHRONIC BILATERAL THORACIC BACK PAIN: ICD-10-CM

## 2022-07-18 DIAGNOSIS — Z00.00 ENCOUNTER FOR MEDICARE ANNUAL WELLNESS EXAM: Primary | ICD-10-CM

## 2022-07-18 DIAGNOSIS — E78.00 HYPERCHOLESTEREMIA: ICD-10-CM

## 2022-07-18 DIAGNOSIS — M54.50 CHRONIC BILATERAL LOW BACK PAIN, UNSPECIFIED WHETHER SCIATICA PRESENT: ICD-10-CM

## 2022-07-18 DIAGNOSIS — G89.29 CHRONIC BILATERAL LOW BACK PAIN, UNSPECIFIED WHETHER SCIATICA PRESENT: ICD-10-CM

## 2022-07-18 DIAGNOSIS — C54.1 ENDOMETRIAL CANCER (HCC): ICD-10-CM

## 2022-07-18 DIAGNOSIS — I10 ESSENTIAL HYPERTENSION: ICD-10-CM

## 2022-07-18 PROCEDURE — 3017F COLORECTAL CA SCREEN DOC REV: CPT | Performed by: PHYSICIAN ASSISTANT

## 2022-07-18 PROCEDURE — 1101F PT FALLS ASSESS-DOCD LE1/YR: CPT | Performed by: PHYSICIAN ASSISTANT

## 2022-07-18 PROCEDURE — G0463 HOSPITAL OUTPT CLINIC VISIT: HCPCS | Performed by: PHYSICIAN ASSISTANT

## 2022-07-18 PROCEDURE — G0439 PPPS, SUBSEQ VISIT: HCPCS | Performed by: PHYSICIAN ASSISTANT

## 2022-07-18 PROCEDURE — 99214 OFFICE O/P EST MOD 30 MIN: CPT | Performed by: PHYSICIAN ASSISTANT

## 2022-07-18 PROCEDURE — G8399 PT W/DXA RESULTS DOCUMENT: HCPCS | Performed by: PHYSICIAN ASSISTANT

## 2022-07-18 PROCEDURE — G8420 CALC BMI NORM PARAMETERS: HCPCS | Performed by: PHYSICIAN ASSISTANT

## 2022-07-18 PROCEDURE — G8536 NO DOC ELDER MAL SCRN: HCPCS | Performed by: PHYSICIAN ASSISTANT

## 2022-07-18 PROCEDURE — 1090F PRES/ABSN URINE INCON ASSESS: CPT | Performed by: PHYSICIAN ASSISTANT

## 2022-07-18 PROCEDURE — G8427 DOCREV CUR MEDS BY ELIG CLIN: HCPCS | Performed by: PHYSICIAN ASSISTANT

## 2022-07-18 PROCEDURE — G8432 DEP SCR NOT DOC, RNG: HCPCS | Performed by: PHYSICIAN ASSISTANT

## 2022-07-18 NOTE — PROGRESS NOTES
Chapincito Coleman is a 74 y.o. female    Chief Complaint   Patient presents with    Follow-up       Visit Vitals  BP (!) 157/72   Pulse 60   Temp 98.1 °F (36.7 °C) (Temporal)   Resp 18   Ht 5' 7\" (1.702 m)   Wt 159 lb (72.1 kg)   SpO2 97%   BMI 24.90 kg/m²       3 most recent PHQ Screens 7/18/2022   Little interest or pleasure in doing things Not at all   Feeling down, depressed, irritable, or hopeless Not at all   Total Score PHQ 2 0       Fall Risk Assessment, last 12 mths 7/18/2022   Able to walk? Yes   Fall in past 12 months? 0       Abuse Screening Questionnaire 10/8/2019   Do you ever feel afraid of your partner? N   Are you in a relationship with someone who physically or mentally threatens you? N   Is it safe for you to go home? Y       1. Have you been to the ER, urgent care clinic since your last visit? Hospitalized since your last visit? No     2. Have you seen or consulted any other health care providers outside of the [address] [place] [address] since your last visit? Include any pap smears or colon screening.  No

## 2022-07-18 NOTE — PROGRESS NOTES
This is the Subsequent Medicare Annual Wellness Exam, performed 12 months or more after the Initial AWV or the last Subsequent AWV  Health Maintenance Due   Topic Date Due    Colorectal Cancer Screening Combo  Never done    Shingrix Vaccine Age 50> (1 of 2) Never done    Pneumococcal 65+ years (1 - PCV) Never done    Medicare Yearly Exam  08/08/2018    Breast Cancer Screen Mammogram  11/06/2020    DTaP/Tdap/Td series (2 - Td or Tdap) 10/06/2021     Depression Risk Factor Screening:     3 most recent PHQ Screens 7/18/2022   Little interest or pleasure in doing things Not at all   Feeling down, depressed, irritable, or hopeless Not at all   Total Score PHQ 2 0       Abuse Screen  Patient is not abused    Fall Risk  Fall Risk Assessment, last 12 mths 7/18/2022   Able to walk? Yes   Fall in past 12 months? 0       Alcohol Risk Factor Screening:    reports previous alcohol use. Rarely 1 drink. Functional Ability and Level of Safety:   Hearing Loss  Hearing is good.      Activities of Daily Living  The home contains: handrails  Patient does total self care     Cognitive Screening   Evaluation of Cognitive Function:  Has your family/caregiver stated any concerns about your memory: no     Patient Care Team   Patient Care Team:  Gina Posey as PCP - General (Physician Assistant)  Jeremias Dick PA-C as PCP - REHABILITATION [facility] Empaneled Provider  Anatoly Valdivia MD (Dermatology Physician)  Valente Pepe MD (Orthopedic Surgery)  Brenda Schulz MD (Ophthalmology)  Dieudonne Almaraz NP (Surgery Physician)    Assessment/Plan   Education and counseling provided:  Are appropriate based on today's review and evaluation    Extended Emergency Contact Information  Primary Emergency Contact: Ruth Reynoso  Address: [place] [address] Phone: [phone]  Relation: Sister  Secondary Emergency Contact: Alfonso Hoff Phone: [phone]  Relation: Other Relative    ACP on file from 2019    I have reviewed the patient's medical history in detail and updated the computerized patient record. History     Past Medical History:   Diagnosis Date    BCC (basal cell carcinoma)     Cancer (HCC) 10/2019    UTERINE    Degenerative joint disease (DJD) of lumbar spine     GERD (gastroesophageal reflux disease)     Glaucoma     Glaucoma     Both eyes    Headache     Nausea & vomiting     PUD (peptic ulcer disease)     \"MANY YEARS AGO\"    Rheumatic fever     AS CHILD    Sinus bradycardia by electrocardiogram 11/21/2019      Past Surgical History:   Procedure Laterality Date    HX COLPOSCOPY      abn pap    HX HEENT      SHAMA CATARACTS    HX HIP REPLACEMENT      x2    HX HYSTERECTOMY  11/27/2019    Dr. Malathi Pantoja X2    HX OTHER SURGICAL      DOG BIT SURGERY ON RIGHT HAND    HX WISDOM TEETH EXTRACTION       Current Outpatient Medications   Medication Sig Dispense Refill    multivit-min/iron/folic/lutein (CENTRUM SILVER WOMEN PO) Take  by mouth.  omeg3/epa/dha/fish oil/flax/E (THERATEARS NUTRITION PO) Take  by mouth.  latanoprost (XALATAN) 0.005 % ophthalmic solution Administer 1 Drop to both eyes nightly.  calcium carbonate (TUMS) 200 mg calcium (500 mg) chew Take 1 Tab by mouth daily.  psyllium husk (METAMUCIL PO) Take 1 Tab by mouth nightly.  vit A/vit C/vit E/zinc/copper (PRESERVISION AREDS PO) Take 1 Tab by mouth every fourty-eight (48) hours.  naproxen sodium (ALEVE) 220 mg cap Take  by mouth daily as needed.  SF 5000 PLUS 1.1 % crea TOOTHPASTE  4    timolol (TIMOPTIC-XE) 0.25 % ophthalmic gel-forming Administer 1 Drop to both eyes daily. 0    cholecalciferol (VITAMIN D3) 1,000 unit tablet Take 1,000 Units by mouth daily.        Allergies   Allergen Reactions    Shellfish Containing Products Diarrhea    Aspirin Other (comments) heartburn    Penicillins Rash     Had PCN many years ago and had a rash. Has had it once since then with no reaction. Patient requesting to have PCN removed.      Family History   Problem Relation Age of Onset    Cancer Mother         lung    OSTEOARTHRITIS Mother     Heart Disease Father         pacer    OSTEOARTHRITIS Father     Cancer Father         SKIN - basal cell    Pacemaker Father     Heart Surgery Father         STENT    Colon Polyps Father         no firm diagnosis of any cancer    Diabetes Sister     Other Sister         \"SLIGHT MENTAL RETARDATION\" - aphasic    Colon Polyps Sister         hx of polyps, uncertain of cancer    Other Sister         \"GUILLIAN BARRE\"    Breast Cancer Sister 76    Cancer Sister 58        endometrial    No Known Problems Daughter         born imperforate anus    Anesth Problems Neg Hx      Social History     Tobacco Use    Smoking status: Former Smoker     Packs/day: 0.50     Years: 20.00     Pack years: 10.00     Quit date: 10/6/1978     Years since quittin.8    Smokeless tobacco: Never Used    Tobacco comment: SOMOKE OFF AND ON   Substance Use Topics    Alcohol use: Yes     Comment: RARELY     Patient Active Problem List   Diagnosis Code    Glaucoma H40.9    Degenerative joint disease (DJD) of lumbar spine M47.816    GERD (gastroesophageal reflux disease) K21.9    History of rheumatic fever Z86.79    PUD (peptic ulcer disease) K27.9    Vaccination refused by patient Z28.21    Colonoscopy refused Z53.20    Primary insomnia F51.01    White coat syndrome without diagnosis of hypertension R03.0    Living will, counseling/discussion Z71.89    Sinus bradycardia by electrocardiogram R00.1    History of endometrial cancer Z85.42    Primary open angle glaucoma (POAG) H40.1190    Nuclear 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    Pseudophakia of both eyes Z96.1    Chronic bilateral low back pain, unspecified whether sciatica present M54.50, G89.29    Chronic bilateral thoracic back pain M54.6, G89.29    Neuralgia M79.2    Changes in vascular appearance of retina, unspecified laterality H35.019    Fatigue, unspecified type R53.83    Hypercholesteremia E78.00

## 2022-07-18 NOTE — PROGRESS NOTES
Martha Emanuel is a 74 y.o. female  Chief Complaint   Patient presents with    Follow-up     Visit Vitals  BP (!) 157/72   Pulse 60   Temp 98.1 °F (36.7 °C) (Temporal)   Resp 18   Ht 5' 7\" (1.702 m)   Wt 159 lb (72.1 kg)   SpO2 97%   BMI 24.90 kg/m²      Health Maintenance Due   Topic Date Due    Hepatitis C Screening  Never done    Colorectal Cancer Screening Combo  Never done    Shingrix Vaccine Age 50> (1 of 2) Never done    Pneumococcal 65+ years (1 - PCV) Never done    Medicare Yearly Exam  08/08/2018    Breast Cancer Screen Mammogram  11/06/2020    DTaP/Tdap/Td series (2 - Td or Tdap) 10/06/2021     HPI  Hx Endometrial cancer - seeing GYN regularly. Hx elevated cholesterol with ASCVD score, and repeatedly elevated BPs in office. Pt declines tx for any of these. She notes understanding of risks including MI/Stroke. Lab Results   Component Value Date/Time    Cholesterol, total 215 (H) 06/22/2022 08:29 AM    HDL Cholesterol 63 06/22/2022 08:29 AM    LDL, calculated 129 (H) 06/22/2022 08:29 AM    LDL, calculated 110 (H) 08/25/2017 09:53 AM    VLDL, calculated 23 06/22/2022 08:29 AM    VLDL, calculated 24 08/25/2017 09:53 AM    Triglyceride 131 06/22/2022 08:29 AM     The 10-year ASCVD risk score (Antoinette Palmer, et al., 2013) is: 20.9%    HTN Follow up - BP remains uncontrolled. Pt declines tx:  BP Readings from Last 3 Encounters:   07/18/22 (!) 157/72   06/17/22 130/80   06/16/22 (!) 152/89     Currently taking:   Key CAD CHF Meds             Omega-3 Fatty Acids 300 mg cap Take 3 Tabs by mouth daily. Lab Results   Component Value Date/Time    Creatinine (POC) 0.8 10/03/2020 09:28 AM    Creatinine 0.83 06/22/2022 08:29 AM     Pt took my xray order to [facility]. Will request report. ROS  Review of Systems   Respiratory: Negative for shortness of breath. Cardiovascular: Negative for chest pain and palpitations. Neurological: Negative for dizziness, loss of consciousness and weakness. Psychiatric/Behavioral: Negative for depression and substance abuse. The patient is not nervous/anxious. EXAM  Physical Exam  Vitals and nursing note reviewed. Constitutional:       General: She is not in acute distress. Appearance: She is well-developed. HENT:      Head: Normocephalic and atraumatic. Neck:      Vascular: No JVD. Cardiovascular:      Rate and Rhythm: Normal rate and regular rhythm. Heart sounds: Normal heart sounds. Pulmonary:      Effort: Pulmonary effort is normal. No respiratory distress. Breath sounds: Normal breath sounds. Musculoskeletal:      Cervical back: Neck supple. Skin:     General: Skin is warm and dry. Neurological:      Mental Status: She is alert and oriented to person, place, and time. Psychiatric:         Mood and Affect: Mood normal.         Behavior: Behavior normal.         Thought Content: Thought content normal.         Judgment: Judgment normal.       ASSESSMENT/PLAN  Encounter Diagnoses     ICD-10-CM ICD-9-CM   1. Encounter for Medicare annual wellness exam  Z00.00 V70.0   2. Endometrial cancer (HCC)  C54.1 182.0   3. Hypercholesteremia  E78.00 272.0   4. Chronic bilateral low back pain, unspecified whether sciatica present  M54.50 724.2    G89.29 338.29   5. Chronic bilateral thoracic back pain  M54.6 724.1    G89.29 338.29   6. Essential hypertension  I10 401.9     Pt declines tx for HTN, Cholesterol, or elevated ASCVD score. She notes understanding of risk of MI/stroke. Follow up with Onc/GYN as planned. Stretches given for back pain. Will request xray reports.

## 2022-07-20 ENCOUNTER — TELEPHONE (OUTPATIENT)
Dept: INTERNAL MEDICINE CLINIC | Age: 72
End: 2022-07-20

## 2022-07-20 DIAGNOSIS — S32.010A COMPRESSION FRACTURE OF L1 VERTEBRA, INITIAL ENCOUNTER (HCC): Primary | ICD-10-CM

## 2022-07-20 DIAGNOSIS — M51.36 DDD (DEGENERATIVE DISC DISEASE), LUMBAR: ICD-10-CM

## 2022-07-20 PROBLEM — M51.369 DDD (DEGENERATIVE DISC DISEASE), LUMBAR: Status: ACTIVE | Noted: 2022-07-20

## 2022-07-20 NOTE — TELEPHONE ENCOUNTER
Please inform pt that she does have a compression fracture of L1 (lumbar vertebrae), and degenerative disc disease in her lower spine. I recommend that she sees Ortho spine.      Dr. Anitha Carter  or  Dr. Rad Esteves [id number]  [place], [address] [facility]  Phone: [phone]    Dr. Talavera Erm # [place], [address]  Phone: [phone]    Dr. Alirio Rivera  [address] [facility] [address] [facility] [address] [facility] [address]  [place], [address] [facility]  [phone]

## 2022-07-28 ENCOUNTER — VIRTUAL VISIT (OUTPATIENT)
Dept: GYNECOLOGY | Age: 72
End: 2022-07-28
Payer: MEDICARE

## 2022-07-28 DIAGNOSIS — C54.1 ENDOMETRIAL CANCER (HCC): Primary | ICD-10-CM

## 2022-07-28 PROCEDURE — 1090F PRES/ABSN URINE INCON ASSESS: CPT | Performed by: OBSTETRICS & GYNECOLOGY

## 2022-07-28 PROCEDURE — 99213 OFFICE O/P EST LOW 20 MIN: CPT | Performed by: OBSTETRICS & GYNECOLOGY

## 2022-07-28 PROCEDURE — G0463 HOSPITAL OUTPT CLINIC VISIT: HCPCS | Performed by: OBSTETRICS & GYNECOLOGY

## 2022-07-28 PROCEDURE — G8427 DOCREV CUR MEDS BY ELIG CLIN: HCPCS | Performed by: OBSTETRICS & GYNECOLOGY

## 2022-07-28 PROCEDURE — 3017F COLORECTAL CA SCREEN DOC REV: CPT | Performed by: OBSTETRICS & GYNECOLOGY

## 2022-07-28 PROCEDURE — G8432 DEP SCR NOT DOC, RNG: HCPCS | Performed by: OBSTETRICS & GYNECOLOGY

## 2022-07-28 PROCEDURE — 1101F PT FALLS ASSESS-DOCD LE1/YR: CPT | Performed by: OBSTETRICS & GYNECOLOGY

## 2022-07-28 PROCEDURE — 1123F ACP DISCUSS/DSCN MKR DOCD: CPT | Performed by: OBSTETRICS & GYNECOLOGY

## 2022-07-28 PROCEDURE — G8399 PT W/DXA RESULTS DOCUMENT: HCPCS | Performed by: OBSTETRICS & GYNECOLOGY

## 2022-07-28 NOTE — PROGRESS NOTES
[facility] [address], [address]  P [phone]  F [phone]    Office Note  Patient ID:  Name:  Howie Cushing  MRN:  [id number]  :  1950/72 y.o. Date:  2022      HISTORY OF PRESENT ILLNESS:  Howie Cushing is a 74 y.o.  postmenopausal female who is an established patient with a diagnosis of stage IA, grade 3 endometrial cancer. She underwent a robotic assisted TLH, BSO, SPLND in 2019. She had minimal invasion, no LVSI, and negative washings. I did not recommend any adjuvant therapy, though I did discuss her risks of recurrence. She underwent genetic testing and had a VUS of the SMAD4 gene, while negative for any other mutations. She did have a second opinion at [address] [place] [address] with Dr. Jennifer Baptiste, and ultimately underwent vaginal cuff brachytherapy there, which she completed in 2020. She presented in 2020 for follow-up. She stated that she was enrolled in a clinical trial at [address] [place] [address] on the effects of the use of vaginal dilators. She was last seen there a few months prior. She was without complaints. She denied any vaginal bleeding outside of when she uses the dilator, but that is not every time. She denied pelvic or abdominal pain. On pelvic exam I noted a small friable nodular area on the left anterolateral vagina measuring about 1.5 cm. I performed a biopsy of this suspicious lesion. The mass was almost removed completely with the biopsy. FINAL PATHOLOGIC DIAGNOSIS   Left upper vaginal wall, biopsy:   Metastatic adenocarcinoma, compatible with endometrial primary (see comment)   Comment   Immunochemical stains are performed. Tumor cells are positive for ER, MA, and p16 (patchy) while negative for CEA and Napsin A. Given the patient's history, morphologic findings, and immunohistochemical staining pattern the above diagnosis is rendered.      I ordered a CT of the chest/abdomen/pelvis to evaluate. It did not demonstrate any other disease. I recommended radiation therapy and suggested that she follow-up at [facility] with her radiation oncologist there, Dr. Nikhil Reyna. She presented subsequently to discuss treatment options, as she was not enthusiastic about pelvic radiation therapy. She also stated she had another opinion scheduled with Dr. Mark Navarro at [facility] for the following day. He ordered a PET/CT that showed no evidence of disease outside of the pelvis. He essentially told her the same thing and recommended pelvic radiation therapy. Her referred her to Dr. Kimberley Coleman with [facility] for treatment. Following those consultation she went back to see Dr. Queta Bryson at [facility], as well as Dr. Kimberly Waterman at [facility], for additional consultation. She stated that she and Dr. Ramona Kawasaki \"didn't mesh\". She presented to see me in January 2021 to ask my opinion on treatment and for my recommendations. I again explained to her that radiation therapy would be the standard of care. Surgical excision of the area might be an option, but still would not eliminate the need for radiation. I again explained that I didn't think chemotherapy, immunotherapy, or hormonal therapy would be good options, especially with radiation likely being a curative treatment. I was still not sure why she was so opposed to radiation and I again tried to alleviate her concerns regarding the treatment. I stressed that she needed to get started on therapy as soon as possible, as we had known about this recurrence since late September. I stressed that every day she delayed treatment she was decreasing her chances at salvage. She had another PET/CT in February 2021 at [facility] prior to her radiation, which was reportedly negative, though I don't have a copy of those results. She ultimately completed pelvic radiation therapy at [facility] with Dr. Queta Bryson, which she completed in April 2021. She was seen for follow-up in June 2022.   Her exam at that time was negative. Her last scan was in December 2021, which was negative. She presents today virtually to review her latest CT scan. It shows no evidence of disease. ROS:   and GI review:  Negative  Cardiopulmonary review:  Negative   Musculoskeletal:  Negative    A comprehensive review of systems was negative except for that written in the History of Present Illness. , 10 point ROS      Problem List:  Patient Active Problem List    Diagnosis Date Noted    DDD (degenerative disc disease), lumbar 07/20/2022    Compression fracture of L1 vertebra, initial encounter ([facility] 07/20/2022    Endometrial cancer ([facility] 07/18/2022    Chronic bilateral low back pain, unspecified whether sciatica present 06/17/2022    Chronic bilateral thoracic back pain 06/17/2022    Neuralgia 06/17/2022    Changes in vascular appearance of retina, unspecified laterality 06/17/2022    Fatigue, unspecified type 06/17/2022    Hypercholesteremia 06/17/2022    History of endometrial cancer 11/27/2019    Sinus bradycardia by electrocardiogram 11/21/2019    Pseudophakia of both eyes 05/29/2018    White coat syndrome without diagnosis of hypertension 08/07/2017    Living will, counseling/discussion 08/07/2017    Primary open angle glaucoma (POAG) 12/19/2016    Nuclear nonsenile cataract 12/19/2016    Vaccination refused by patient 10/06/2016    Colonoscopy refused 10/06/2016    Primary insomnia 10/06/2016    Essential hypertension     Glaucoma     Degenerative joint disease (DJD) of lumbar spine     GERD (gastroesophageal reflux disease)     History of rheumatic fever     PUD (peptic ulcer disease)      PMH:  Past Medical History:   Diagnosis Date    BCC (basal cell carcinoma)     Cancer ([facility] 10/2019    UTERINE    Degenerative joint disease (DJD) of lumbar spine     GERD (gastroesophageal reflux disease)     Glaucoma     Glaucoma     Both eyes    Headache     Nausea & vomiting     PUD (peptic ulcer disease)     \"MANY YEARS AGO\"    Rheumatic fever     AS CHILD    Sinus bradycardia by electrocardiogram 2019      PSH:  Past Surgical History:   Procedure Laterality Date    HX COLPOSCOPY      abn pap    HX HEENT      SHAMA CATARACTS    HX HIP REPLACEMENT      x2    HX HYSTERECTOMY  2019    Dr. Iris Siddiqi    HX ORTHOPAEDIC      SHAMA HIP REPLACEMENT    HX ORTHOPAEDIC      LEFT ELBOW FOR FX X2    HX OTHER SURGICAL      DOG BIT SURGERY ON RIGHT HAND    HX WISDOM TEETH EXTRACTION        Social History:  Social History     Tobacco Use    Smoking status: Former     Packs/day: 0.50     Years: 20.00     Pack years: 10.00     Types: Cigarettes     Quit date: 10/6/1978     Years since quittin.8    Smokeless tobacco: Never    Tobacco comments:     SOMOKE OFF AND ON   Substance Use Topics    Alcohol use: Not Currently     Comment: 1 drink at new years      Family History:  Family History   Problem Relation Age of Onset    Cancer Mother         lung    OSTEOARTHRITIS Mother     Heart Disease Father         pacer    OSTEOARTHRITIS Father     Cancer Father         SKIN - basal cell    Pacemaker Father     Heart Surgery Father         STENT    Colon Polyps Father         no firm diagnosis of any cancer    Diabetes Sister     Other Sister         \"SLIGHT MENTAL RETARDATION\" - aphasic    Colon Polyps Sister         hx of polyps, uncertain of cancer    Other Sister         \"GUILLIAN BARRE\"    Breast Cancer Sister 76    Cancer Sister 58        endometrial    No Known Problems Daughter         born imperforate anus    Anesth Problems Neg Hx       Medications: (reviewed)  Current Outpatient Medications   Medication Sig    multivit-min/iron/folic/lutein (CENTRUM SILVER WOMEN PO) Take  by mouth. omeg3/epa/dha/fish oil/flax/E (THERATEARS NUTRITION PO) Take  by mouth.    latanoprost (XALATAN) 0.005 % ophthalmic solution Administer 1 Drop to both eyes nightly. calcium carbonate (TUMS) 200 mg calcium (500 mg) chew Take 1 Tab by mouth daily.     psyllium husk (METAMUCIL PO) Take 1 Tab by mouth nightly. vit A/vit C/vit E/zinc/copper (PRESERVISION AREDS PO) Take 1 Tab by mouth every fourty-eight (48) hours. naproxen sodium (ALEVE) 220 mg cap Take  by mouth daily as needed. SF 5000 PLUS 1.1 % crea TOOTHPASTE    timolol (TIMOPTIC-XE) 0.25 % ophthalmic gel-forming Administer 1 Drop to both eyes daily. cholecalciferol (VITAMIN D3) 1,000 unit tablet Take 1,000 Units by mouth daily. No current facility-administered medications for this visit. Allergies: (reviewed)  Allergies   Allergen Reactions    Shellfish Containing Products Diarrhea    Aspirin Other (comments)     heartburn    Penicillins Rash     Had PCN many years ago and had a rash. Has had it once since then with no reaction. Patient requesting to have PCN removed. OBJECTIVE:    Physical Exam:  VITAL SIGNS: There were no vitals filed for this visit. There is no height or weight on file to calculate BMI. GENERAL SHAMEKA:    HEENT:    RESPIRATORY:    CARDIOVASC:    GASTROINT:    MUSCULOSKEL:    EXTREMITIES:    PELVIC:    RECTAL:    EDEL SURVEY:    NEURO:        Lab Data:    Lab Results   Component Value Date/Time    WBC 4.4 06/22/2022 08:29 AM    HGB 13.5 06/22/2022 08:29 AM    HCT 40.5 06/22/2022 08:29 AM    PLATELET 943 91/62/5355 08:29 AM    MCV 94 06/22/2022 08:29 AM     Lab Results   Component Value Date/Time    Sodium 138 06/22/2022 08:29 AM    Potassium 5.0 06/22/2022 08:29 AM    Chloride 99 06/22/2022 08:29 AM    CO2 26 06/22/2022 08:29 AM    Anion gap 5 11/28/2019 04:16 AM    Glucose 94 06/22/2022 08:29 AM    BUN 14 06/22/2022 08:29 AM    Creatinine 0.83 06/22/2022 08:29 AM    BUN/Creatinine ratio 17 06/22/2022 08:29 AM    GFR est AA >60 11/28/2019 04:16 AM    GFR est non-AA 52 (L) 11/28/2019 04:16 AM    Calcium 10.2 06/22/2022 08:29 AM         CT of chest/abdomen/pelvis (10/3/20)  Chest:  Lungs: There is a calcified granuloma within the right lower lobe.  Lungs are  otherwise clear.     Lymph nodes: There is no mediastinal, hilar or axillary lymphadenopathy. There  are several calcified mediastinal and right hilar lymph nodes. Pleura: There is no prior study for direct comparison. Heart: The heart is normal in size and there is no pericardial fluid. Bones: No lytic or sclerotic osseous lesion is visualized. Abdomen/pelvis:  Liver: There is diffuse fatty infiltration of the liver. There is no suspicious  focal hepatic lesion. Spleen: The spleen is normal.     Pancreas: The pancreas is normal.     Adrenals: The adrenals are normal.     Gallbladder: There is a gallstone in the gallbladder; there is no CT evidence of  acute cholecystitis. Kidneys: There is a 2 mm non-obstructing left renal calculus. There are 2  subcentimeter left renal hypodensities, too small to further characterize, but  statistically likely to represent cysts. Note is made of a left retroaortic  renal vein. Lymph nodes\omentum\peritoneum. There is no marisa hepatis, mesenteric,  retroperitoneal or pelvic lymphadenopathy. No intra-abdominal soft tissue nodule  or mass is seen. Bowel: No dilated or thickened loop of large or small bowel is seen. Urinary bladder: Urinary bladder is partially filled and grossly normal.     Bones: There is beam hardening artifact in the pelvis related to bilateral hip  prostheses. No lytic or sclerotic osseous lesion is visualized. Miscellaneous: There is no free intraperitoneal gas or fluid. There is no focal  fluid collection to suggest abscess. Uterus is absent. The left and right  ovaries are not visualized, however there are two adjacent cysts within the left  hemipelvis, the larger of the two measures 2.3 cm x 2.2 cm. IMPRESSION: No evidence of metastatic disease within the chest, abdomen or  pelvis. Two adjacent cysts within the left hemipelvis, arising from the left  ovary.       PET/CT (1/27/21) - [facility]  Head and Neck: Physiologic radiotracer uptake is present within imaged portion of brain, adenoids, tonsils, salivary glands, and vocal cords. No hypermetabolic cervical adenopathy. There is increased radiotracer uptake in the extraocular muscles and tongue, likely due to activation. Thyroid gland is unremarkable. Chest: Heart size is normal. No pericardial effusion. Physiologic uptake is seen in the myocardium. Multiple calcified mediastinal and right hilar lymph nodes likely from prior granulomatous insult. No axillary adenopathy. Central airways are patent. No hypermetabolic lung parenchymal lesions. No focal consolidation. No pleural effusion or pneumothorax. Abdomen and Pelvis: There is physiologic FDG distribution with uniform uptake within liver and spleen. Large peripherally calcified gallstone in the body of the gallbladder. Pancreas is atrophic. No adrenal mass. Tracer excretion can be visualized in bilateral kidneys, ureters, and urinary bladder. Extensive streak artifact from bilateral hip arthroplasties limits assessment of pelvic structures. Status post WESLY and BSO. There is a hypermetabolic focus in the vaginal cuff with a max SUV of 7.0 (image 197). Redemonstrated photopenic bilobed cystic structure adjacent to the left external iliac vessels measuring 1.5 x 2.0 cm (image 162). There is a small to moderate-sized sliding-type hiatal hernia with layering debris in the lower esophagus and increased FDG uptake, max SUV 4.3 (image 67). Tracer distribution within the stomach and bowel appears. Physiologic. No hypermetabolic abdominopelvic lymphadenopathy. Bones and Soft Tissues: No suspicious focal abnormalities are identified within visualized osseous structures. Status post bilateral total hip arthroplasties. Multilevel degenerative changes of the visualized spine with chronic appearing compression deformity of the L1 vertebral body. Impression and Comments:   1. Final report.    2.  Limited assessment of the pelvic structures secondary to extensive streak artifact from the bilateral hip arthroplasties. 3.  Status post WESLY and BSO. There is focal increased FDG uptake in the right vaginal cuff which likely corresponds with area of known recurrence. 4.  No definite evidence of distant metastatic disease. 5.  Cystic structure adjacent to the left external iliac vessels does not demonstrate increased FDG uptake. Consider further evaluation with a pelvic ultrasound to better characterize. 6.  Small to moderate-sized sliding-type hiatal hernia with increased metabolic activity noted in the lower esophagus suggestive of reflux esophagitis. 7.  Stable appearance of multiple calcified mediastinal and hilar lymph nodes suggestive of prior granulomatous insult. CT of abdomen/pelvis (8/9/21) - [facility]  Lower thorax: Limited imaging of the lung bases showed no evidence of airspace disease, nodule, or pleural effusion. The heart size was normal. See chest CT report for complete chest findings. Liver: Normal size. Peripheral 5 mm hypodensity inferior right hepatic lobe, too small to characterize. Ill-defined 18 x 16 mm area of hypodensity adjacent to falciform ligament. May represent fatty change along falciform ligament however more prominent than on prior study still favor focal fat however metastatic lesion not excluded. MR would clarify. . No other focal lesions seen. Biliary tract: Large rim calcified 2.1 cm gallstone. No evidence of acute cholecystitis or dilated ducts. Pancreas: Normal size and contour. No dilated ducts. Spleen: Normal size. No defects. Adrenal glands: Normal.     Kidneys: 2.9 mm nonobstructing calculus left kidney, 13 mm cyst left kidney as well as subcentimeter hypodensities. Right kidney normal. No hydronephrosis or mass. Vascular structures: Scattered calcifications throughout abdominal aorta. No aneurysm. Major branch vessels patent. Retroaortic left renal vein, anatomic variant.  No calcifications and left common iliac artery with punctate calcification right common iliac artery. No calcifications and remainder of the iliac arteries which are patent bilaterally. Peritoneum and retroperitoneum: No evidence of free air or free fluid. Borderline enlarged celiac axis lymph node with no periaortic, mesenteric, or pelvic lymphadenopathy. GI tract: Small sliding hiatal hernia. Stomach otherwise normal. Duodenum and small bowel have a normal appearance. Sigmoid diverticulosis without diverticulitis. Remainder of colon has a normal appearance. Appendix identified and normal.     Pelvis: Artifact over pelvis from hip replacements. Status post hysterectomy. Bladder distended and appears to be normal. Bilobular left adnexal mass measuring 4.9 x 2.2 cm, similar to prior. Bones and soft tissues: Bilateral total hip replacements. Artifact and pelvis from prostheses. Prostheses appear to be in satisfactory position. Degenerative changes of both acetabula and both sacroiliac joints. Degenerative disc disease L5-S1 and L4-L5 as well as L2-L3. Compression fracture L1, unchanged. Scoliosis. No suspicious bony lesion. CONCLUSIONS:   1. Status post hysterectomy Artifact over lower pelvis in area of vaginal cuff excludes complete evaluation of this area. No definite pelvic mass. Bilobular cystic left adnexal mass. Stable compared to prior. Follow-up with ultrasound. 2. Ill-defined 18 x 16 mm hypodensity adjacent to falciform ligament in liver. May be area of focal fat, favor, however more distinct and slightly larger on this study. Metastatic focus can't be excluded. MR would clarify. 3. Gallstone without acute cholecystectomy   4. No adenopathy. 5. Left renal cysts, stable. 6. Diverticulosis without diverticulitis. 7. Bilateral total hip replacements. Degenerative changes lumbosacral spine with altered level degenerative disc disease, compression fracture, chronic, L1 and scoliosis. CT of abdomen/pelvis (12/10/21) - [facility]  Lower thorax: Limited imaging of the lung bases showed no evidence of airspace disease, nodule, or pleural effusion. The heart size was normal. See chest CT report for complete chest findings. Liver: Normal size. Peripheral 5 mm hypodensity inferior right hepatic lobe, too small to characterize. Ill-defined 18 x 16 mm area of hypodensity adjacent to falciform ligament unchanged, most likely representing focal fatty infiltration. No other focal lesions seen. Biliary tract: Large rim calcified 1.5 cm gallstone. No evidence of acute cholecystitis or dilated ducts. Pancreas: Normal size and contour. No dilated ducts. Spleen: Normal size. No defects. Adrenal glands: Normal.     Kidneys: 2.9 mm nonobstructing calculus left kidney, 13 mm cyst left kidney as well as subcentimeter hypodensities. Right kidney normal. No hydronephrosis or mass. Vascular structures: Scattered calcifications throughout abdominal aorta. No aneurysm. Major branch vessels patent. Retroaortic left renal vein, anatomic variant. No calcifications and left common iliac artery with punctate calcification right common iliac artery. No calcifications and remainder of the iliac arteries which are patent bilaterally. Peritoneum and retroperitoneum: No evidence of free air or free fluid. No enlarged lymph nodes in the abdomen or in the pelvis. GI tract: Small sliding hiatal hernia. Stomach otherwise normal. Duodenum and small bowel have a normal appearance. Sigmoid diverticulosis without diverticulitis. Remainder of colon has a normal appearance. Appendix identified and normal.     Status post hysterectomy. Bladder is under distended and appears to be normal. Bilobular left adnexal cystic mass measuring 4.9 x 2.2 cm on image #53 of series 7, similar to prior. Bones and soft tissues: Bilateral total hip replacements. Prostheses appear to be in satisfactory position.  Degenerative changes of both acetabula and both sacroiliac joints. Degenerative disc disease L5-S1 and L4-L5 as well as L2-L3. Compression fracture L1, unchanged. Scoliosis. No suspicious bony lesion. CONCLUSIONS:   1.  No definite evidence of tumor recurrence in the pelvis or metastasis to the abdomen or to the pelvis. 2.  Status post hysterectomy. Stable cystic mass in the left adnexa. 3.  Stable ill-defined hypodensity at adjacent to the falciform ligament of the liver most likely representing focal fatty infiltration. 4. Gallstone without acute cholecystectomy   5. No abdominal or pelvic free fluid or lymphadenopathy   6. Bilateral total hip replacements. Degenerative changes lumbosacral spine with altered level degenerative disc disease, compression fracture, chronic, L1 and scoliosis. CT of abdomen/pelvis (7/19/22) - [facility]  Lung Bases: The visualized heart is within normal size. The lung bases are clear. Distal esophagus is patulous with retained oral contrast. Mild mosaic attenuation of the lung bases. Liver: The liver is normal in size and morphology. No suspicious liver lesion. Likely focal fat along the falciform ligament, similar to prior. A subcentimeter right lobe anterior segment hypodensity remains too small to characterize (series 3/137). Gallbladder and Bile Ducts:  Cholelithiasis is noted. Otherwise the gallbladder appears within normal limits. No biliary ductal dilatation. Pancreas: The pancreas is normal in appearance. No pancreatic ductal dilatation. Spleen:  Spleen is within normal size without suspicious focal abnormality. Small calcified splenic granuloma. Adrenals: No adrenal mass. Mild nodular thickening of the left adrenal gland is similar to prior. Kidneys, Collecting systems: Stable left renal cyst. Subcentimeter renal hypodensities are too small to characterize, stable. Punctate nonobstructing left renal calculus is again identified.  No hydronephrosis or perinephric stranding. No suspicious renal mass. Evaluation of pelvic structures is limited by streak artifact from bilateral total hip arthroplasties. Urinary Bladder: No definite focal bladder abnormality. Reproductive Organs: Status post hysterectomy. Visualized vaginal cuff shows no acute abnormalities. Stable bilobed cystic focus measuring up to 4.7 x 2 cm, along the left pelvic sidewall/external iliac veins. Peritoneum, Mesentery, Retroperitoneum: No free or loculated fluid in the abdomen or pelvis. No pneumoperitoneum. No retroperitoneal hematoma or soft tissue mass. Nodes: No lymphadenopathy by size criteria. Bowel: No evidence of mechanical intestinal obstruction. No definite focal bowel wall thickening. Appendix shows no acute abnormalities, located in the midline of the midabdomen (series 3/191). Vasculature: Portal, splenic, and superior mesenteric veins are patent. No abdominal aortic aneurysm. Retroaortic left renal vein is noted, anatomic variant. Moderate to possibly severe atherosclerotic narrowing in the SMA in the proximal portion with maintained patency (series 3/130). Replaced right hepatic artery arising from SMA, anatomic variant. Soft tissues: No acute abnormality. Bone windows: No suspicious osseous lesion. 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 Bilateral total hip arthroplasties are noted. 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 Multilevel spondylosis of the spine noted. L1 vertebral body deformity is again noted. IMPRESSION:   1. Status post hysterectomy. 2.  No evidence of recurrent disease or metastasis in the abdomen or pelvis. 3.   Left pelvic sidewall lobulated cystic focus, likely unchanged from prior. 4.  Chronic L1 vertebral body compression deformity. 5.  Patulous distal esophagus with retained oral contrast. Correlate for esophageal dysmotility. 6.  Cholelithiasis. 7.  Punctate nonobstructing left renal calculus.    8. 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 9.  Additional findings as described above. IMPRESSION/PLAN:  Carlos Rosario is a 74 y.o. female with a diagnosis of stage IA, grade 3, endometrial carcinoma. Following her RATLH, BSO, SPLND, she received vaginal brachytherapy at [facility]. She subsequently developed an isolated vaginal cuff recurrence in September 2020. She was recommended pelvic radiation therapy by myself and multiple other consulting physicians. She ultimately completed whole pelvic radiation therapy at [facility]. She had no evidence of disease on her most recent CT done at [facility]. She had no evidence of disease on her last exam in June. Her latest CT shows no evidence of disease. I will plan on seeing her back in 3 months. Carlos Rosario is a 74 y.o. female who was seen by synchronous (real-time) audio-video technology on 7/28/2022 for No chief complaint on file. Assessment & Plan:   Diagnoses and all orders for this visit:    1. Endometrial cancer ([facility]      I spent at least 15 minutes on this visit with this established patient. Subjective:       Prior to Admission medications    Medication Sig Start Date End Date Taking? Authorizing Provider   multivit-min/iron/folic/lutein (CENTRUM SILVER WOMEN PO) Take  by mouth. Provider, Historical   omeg3/epa/dha/fish oil/flax/E (THERATEARS NUTRITION PO) Take  by mouth. Provider, Historical   latanoprost (XALATAN) 0.005 % ophthalmic solution Administer 1 Drop to both eyes nightly. Provider, Historical   calcium carbonate (TUMS) 200 mg calcium (500 mg) chew Take 1 Tab by mouth daily. Provider, Historical   psyllium husk (METAMUCIL PO) Take 1 Tab by mouth nightly. Provider, Historical   vit A/vit C/vit E/zinc/copper (PRESERVISION AREDS PO) Take 1 Tab by mouth every fourty-eight (48) hours. Provider, Historical   naproxen sodium (ALEVE) 220 mg cap Take  by mouth daily as needed. Provider, Historical   SF 5000 PLUS 1.1 % crea TOOTHPASTE 9/18/19   Provider, Historical   timolol (TIMOPTIC-XE) 0.25 % ophthalmic gel-forming Administer 1 Drop to both eyes daily. 9/21/16   Provider, Historical   cholecalciferol (VITAMIN D3) 1,000 unit tablet Take 1,000 Units by mouth daily.     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    GERD (gastroesophageal reflux disease) K21.9    History of rheumatic fever Z86.79    PUD (peptic ulcer disease) K27.9    Vaccination refused by patient Z28.21    Colonoscopy refused Z53.20    Primary insomnia F51.01    White coat syndrome without diagnosis of hypertension R03.0    Living will, counseling/discussion Z71.89    Sinus bradycardia by electrocardiogram R00.1    History of endometrial cancer Z85.42    Primary open angle glaucoma (POAG) H40.1190    Nuclear 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    Pseudophakia of both eyes Z96.1    Chronic bilateral low back pain, unspecified whether sciatica present M54.50, G89.29    Chronic bilateral thoracic back pain M54.6, G89.29    Neuralgia M79.2    Changes in vascular appearance of retina, unspecified laterality H35.019    Fatigue, unspecified type R53.83    Hypercholesteremia E78.00    Endometrial cancer (HCC) C54.1    DDD (degenerative disc disease), lumbar M51.36    Compression fracture of L1 vertebra, [facility] S32.010A     Patient Active Problem List    Diagnosis Date Noted    DDD (degenerative disc disease), lumbar 07/20/2022    Compression fracture of L1 vertebra, initial encounter ([facility] 07/20/2022    Endometrial cancer ([facility] 07/18/2022    Chronic bilateral low back pain, unspecified whether sciatica present 06/17/2022    Chronic bilateral thoracic back pain 06/17/2022    Neuralgia 06/17/2022    Changes in vascular appearance of retina, unspecified laterality 06/17/2022    Fatigue, unspecified type 06/17/2022    Hypercholesteremia 06/17/2022    History of endometrial cancer 11/27/2019    Sinus bradycardia by electrocardiogram 11/21/2019    Pseudophakia of both eyes 05/29/2018    White coat syndrome without diagnosis of hypertension 08/07/2017    Living will, counseling/discussion 08/07/2017    Primary open angle glaucoma (POAG) 12/19/2016    Nuclear nonsenile cataract 12/19/2016    Vaccination refused by patient 10/06/2016    Colonoscopy refused 10/06/2016    Primary insomnia 10/06/2016    Essential hypertension     Glaucoma     Degenerative joint disease (DJD) of lumbar spine     GERD (gastroesophageal reflux disease)     History of rheumatic fever     PUD (peptic ulcer disease)      Current Outpatient Medications   Medication Sig Dispense Refill    multivit-min/iron/folic/lutein (CENTRUM SILVER WOMEN PO) Take  by mouth. omeg3/epa/dha/fish oil/flax/E (THERATEARS NUTRITION PO) Take  by mouth.      latanoprost (XALATAN) 0.005 % ophthalmic solution Administer 1 Drop to both eyes nightly. calcium carbonate (TUMS) 200 mg calcium (500 mg) chew Take 1 Tab by mouth daily. psyllium husk (METAMUCIL PO) Take 1 Tab by mouth nightly. vit A/vit C/vit E/zinc/copper (PRESERVISION AREDS PO) Take 1 Tab by mouth every fourty-eight (48) hours. naproxen sodium (ALEVE) 220 mg cap Take  by mouth daily as needed. SF 5000 PLUS 1.1 % crea TOOTHPASTE  4    timolol (TIMOPTIC-XE) 0.25 % ophthalmic gel-forming Administer 1 Drop to both eyes daily. 0    cholecalciferol (VITAMIN D3) 1,000 unit tablet Take 1,000 Units by mouth daily. Allergies   Allergen Reactions    Shellfish Containing Products Diarrhea    Aspirin Other (comments)     heartburn    Penicillins Rash     Had PCN many years ago and had a rash. Has had it once since then with no reaction. Patient requesting to have PCN removed.      Past Medical History:   Diagnosis Date    BCC (basal cell carcinoma)     Cancer (HCC) 10/2019    [facility]    Degenerative joint disease (DJD) of lumbar spine     GERD (gastroesophageal reflux disease)     Glaucoma     Glaucoma     Both eyes    Headache     Nausea & vomiting     PUD (peptic ulcer disease)     \"MANY YEARS AGO\"    Rheumatic fever     AS CHILD    Sinus bradycardia by electrocardiogram 2019     Past Surgical History:   Procedure Laterality Date    HX COLPOSCOPY      abn pap    HX HEENT      SHAMA CATARACTS    HX HIP REPLACEMENT      x2    HX HYSTERECTOMY  2019    Dr. Charlette Cranker    HX ORTHOPAEDIC      SHAMA HIP REPLACEMENT    HX ORTHOPAEDIC      LEFT ELBOW FOR FX X2    HX OTHER SURGICAL      DOG BIT SURGERY ON RIGHT HAND    HX WISDOM TEETH EXTRACTION       Family History   Problem Relation Age of Onset    Cancer Mother         lung    OSTEOARTHRITIS Mother     Heart Disease Father         pacer    OSTEOARTHRITIS Father     Cancer Father         SKIN - basal cell    Pacemaker Father     Heart Surgery Father         STENT    Colon Polyps Father         no firm diagnosis of any cancer    Diabetes Sister     Other Sister         \"SLIGHT MENTAL RETARDATION\" - aphasic    Colon Polyps Sister         hx of polyps, uncertain of cancer    Other Sister         \"GUILLIAN BARRE\"    Breast Cancer Sister 76    Cancer Sister 58        endometrial    No Known Problems Daughter         born imperforate anus    Anesth Problems Neg Hx      Social History     Tobacco Use    Smoking status: Former     Packs/day: 0.50     Years: 20.00     Pack years: 10.00     Types: Cigarettes     Quit date: 10/6/1978     Years since quittin.8    Smokeless tobacco: Never    Tobacco comments:     SOMOKE OFF AND ON   Substance Use Topics    Alcohol use: Not Currently     Comment: 1 drink at new years       ROS    Objective:   No flowsheet data found.    General: alert, cooperative, no distress   Mental  status: normal mood, behavior, speech, dress, motor activity, and thought processes, able to follow commands   HENT: NCAT   Neck: no visualized mass   Resp: no respiratory distress   Neuro: no gross deficits   Skin: no discoloration or lesions of concern on visible areas   Psychiatric: normal affect, consistent with stated mood, no evidence of hallucinations     Additional exam findings: We discussed the expected course, resolution and complications of the diagnosis(es) in detail. Medication risks, benefits, costs, interactions, and alternatives were discussed as indicated. I advised her to contact the office if her condition worsens, changes or fails to improve as anticipated. She expressed understanding with the diagnosis(es) and plan. Scotty sEcobedo, who was evaluated through a patient-initiated, synchronous (real-time) audio-video encounter, and/or her healthcare decision maker, is aware that it is a billable service, with coverage as determined by her insurance carrier. She provided verbal consent to proceed: Yes, and patient identification was verified. It was conducted pursuant to the emergency declaration under the [address] [place] [address], [address] [place] [address] authority and the [employer] Act. A caregiver was present when appropriate. Ability to conduct physical exam was limited. I was in the office. The patient was at home. An electronic signature was used to sign this note.     Jonn Calloway MD  07/28/22

## 2022-10-24 NOTE — PROGRESS NOTES
Three month check up for history of endometrial cancer. Pt states no abnormal spotting, bleeding or pain. 1. Have you been to the ER, urgent care clinic since your last visit? Hospitalized since your last visit?no    2. Have you seen or consulted any other health care providers outside of the [address] [place] [address] since your last visit? Include any pap smears or colon screening. Bone density scan.

## 2022-10-25 ENCOUNTER — OFFICE VISIT (OUTPATIENT)
Dept: GYNECOLOGY | Age: 72
End: 2022-10-25
Payer: MEDICARE

## 2022-10-25 VITALS
DIASTOLIC BLOOD PRESSURE: 77 MMHG | HEART RATE: 62 BPM | HEIGHT: 67 IN | SYSTOLIC BLOOD PRESSURE: 146 MMHG | BODY MASS INDEX: 25.11 KG/M2 | WEIGHT: 160 LBS

## 2022-10-25 DIAGNOSIS — C54.1 ENDOMETRIAL CANCER (HCC): Primary | ICD-10-CM

## 2022-10-25 PROCEDURE — 1123F ACP DISCUSS/DSCN MKR DOCD: CPT | Performed by: OBSTETRICS & GYNECOLOGY

## 2022-10-25 PROCEDURE — 99213 OFFICE O/P EST LOW 20 MIN: CPT | Performed by: OBSTETRICS & GYNECOLOGY

## 2022-10-25 PROCEDURE — G8419 CALC BMI OUT NRM PARAM NOF/U: HCPCS | Performed by: OBSTETRICS & GYNECOLOGY

## 2022-10-25 PROCEDURE — G8427 DOCREV CUR MEDS BY ELIG CLIN: HCPCS | Performed by: OBSTETRICS & GYNECOLOGY

## 2022-10-25 PROCEDURE — 1101F PT FALLS ASSESS-DOCD LE1/YR: CPT | Performed by: OBSTETRICS & GYNECOLOGY

## 2022-10-25 PROCEDURE — G0463 HOSPITAL OUTPT CLINIC VISIT: HCPCS | Performed by: OBSTETRICS & GYNECOLOGY

## 2022-10-25 PROCEDURE — G8536 NO DOC ELDER MAL SCRN: HCPCS | Performed by: OBSTETRICS & GYNECOLOGY

## 2022-10-25 PROCEDURE — 1090F PRES/ABSN URINE INCON ASSESS: CPT | Performed by: OBSTETRICS & GYNECOLOGY

## 2022-10-25 PROCEDURE — G8432 DEP SCR NOT DOC, RNG: HCPCS | Performed by: OBSTETRICS & GYNECOLOGY

## 2022-10-25 PROCEDURE — G8399 PT W/DXA RESULTS DOCUMENT: HCPCS | Performed by: OBSTETRICS & GYNECOLOGY

## 2022-10-25 PROCEDURE — 3017F COLORECTAL CA SCREEN DOC REV: CPT | Performed by: OBSTETRICS & GYNECOLOGY

## 2022-10-25 NOTE — PROGRESS NOTES
[facility] [address], [address]  P [phone]  F [phone]    Office Note  Patient ID:  Name:  Johanna Hopper  MRN:  [id number]  :  1950/72 y.o. Date:  10/25/2022      HISTORY OF PRESENT ILLNESS:  Johanna Hopper is a 74 y.o.  postmenopausal female who is an established patient with a diagnosis of stage IA, grade 3 endometrial cancer. She underwent a robotic assisted TLH, BSO, SPLND in 2019. She had minimal invasion, no LVSI, and negative washings. I did not recommend any adjuvant therapy, though I did discuss her risks of recurrence. She underwent genetic testing and had a VUS of the SMAD4 gene, while negative for any other mutations. She did have a second opinion at [facility] with Dr. Leobardo Borrego, and ultimately underwent vaginal cuff brachytherapy there, which she completed in 2020. She presented in 2020 for follow-up. She stated that she was enrolled in a clinical trial at [facility] on the effects of the use of vaginal dilators. She was last seen there a few months prior. She was without complaints. She denied any vaginal bleeding outside of when she uses the dilator, but that is not every time. She denied pelvic or abdominal pain. On pelvic exam I noted a small friable nodular area on the left anterolateral vagina measuring about 1.5 cm. I performed a biopsy of this suspicious lesion. The mass was almost removed completely with the biopsy. FINAL PATHOLOGIC DIAGNOSIS   Left upper vaginal wall, biopsy:   Metastatic adenocarcinoma, compatible with endometrial primary (see comment)   Comment   Immunochemical stains are performed. Tumor cells are positive for ER, LA, and p16 (patchy) while negative for CEA and Napsin A. Given the patient's history, morphologic findings, and immunohistochemical staining pattern the above diagnosis is rendered.      I ordered a CT of the chest/abdomen/pelvis to evaluate. It did not demonstrate any other disease. I recommended radiation therapy and suggested that she follow-up at [address] [place] [address] with her radiation oncologist there, Dr. Immanuel Mariscal. She presented subsequently to discuss treatment options, as she was not enthusiastic about pelvic radiation therapy. She also stated she had another opinion scheduled with Dr. Raina Spear at [facility] for the following day. He ordered a PET/CT that showed no evidence of disease outside of the pelvis. He essentially told her the same thing and recommended pelvic radiation therapy. Her referred her to Dr. Teresa Adan with [facility] for treatment. Following those consultation she went back to see Dr. Ramiro Miller at [address] [place] [address], as well as Dr. Scott Hoyos at [address] [place] [address], for additional consultation. She stated that she and Dr. David De Dios \"didn't mesh\". She presented to see me in January 2021 to ask my opinion on treatment and for my recommendations. I again explained to her that radiation therapy would be the standard of care. Surgical excision of the area might be an option, but still would not eliminate the need for radiation. I again explained that I didn't think chemotherapy, immunotherapy, or hormonal therapy would be good options, especially with radiation likely being a curative treatment. I was still not sure why she was so opposed to radiation and I again tried to alleviate her concerns regarding the treatment. I stressed that she needed to get started on therapy as soon as possible, as we had known about this recurrence since late September. I stressed that every day she delayed treatment she was decreasing her chances at salvage. She had another PET/CT in February 2021 at [address] [place] [address] prior to her radiation, which was negative. She ultimately completed pelvic radiation therapy at [facility] with Dr. Ramiro Miller, which she completed in April 2021. She presents today for follow-up. Her last CT in July 2022 at [address] [place] [address] was negative.         ROS:   and GI review: Negative  Cardiopulmonary review:  Negative   Musculoskeletal:  Negative    A comprehensive review of systems was negative except for that written in the History of Present Illness. , 10 point ROS      Problem List:  Patient Active Problem List    Diagnosis Date Noted    DDD (degenerative disc disease), lumbar 07/20/2022    Compression fracture of L1 vertebra, initial encounter ([facility] 07/20/2022    Endometrial cancer ([facility] 07/18/2022    Chronic bilateral low back pain, unspecified whether sciatica present 06/17/2022    Chronic bilateral thoracic back pain 06/17/2022    Neuralgia 06/17/2022    Changes in vascular appearance of retina, unspecified laterality 06/17/2022    Fatigue, unspecified type 06/17/2022    Hypercholesteremia 06/17/2022    History of endometrial cancer 11/27/2019    Sinus bradycardia by electrocardiogram 11/21/2019    Pseudophakia of both eyes 05/29/2018    White coat syndrome without diagnosis of hypertension 08/07/2017    Living will, counseling/discussion 08/07/2017    Primary open angle glaucoma (POAG) 12/19/2016    Nuclear nonsenile cataract 12/19/2016    Vaccination refused by patient 10/06/2016    Colonoscopy refused 10/06/2016    Primary insomnia 10/06/2016    Essential hypertension     Glaucoma     Degenerative joint disease (DJD) of lumbar spine     GERD (gastroesophageal reflux disease)     History of rheumatic fever     PUD (peptic ulcer disease)      PMH:  Past Medical History:   Diagnosis Date    BCC (basal cell carcinoma)     Cancer ([facility] 10/2019    UTERINE    Degenerative joint disease (DJD) of lumbar spine     GERD (gastroesophageal reflux disease)     Glaucoma     Glaucoma     Both eyes    Headache     Nausea & vomiting     PUD (peptic ulcer disease)     \"MANY YEARS AGO\"    Rheumatic fever     AS CHILD    Sinus bradycardia by electrocardiogram 11/21/2019      PSH:  Past Surgical History:   Procedure Laterality Date    HX COLPOSCOPY      abn pap    HX HEENT      SHAMA CATARACTS    HX HIP REPLACEMENT      x2    HX HYSTERECTOMY  2019    Dr. Pina Duffy    HX ORTHOPAEDIC      LEFT ELBOW FOR FX X2    HX OTHER SURGICAL      DOG BIT SURGERY ON RIGHT HAND    HX WISDOM TEETH EXTRACTION        Social History:  Social History     Tobacco Use    Smoking status: Former     Packs/day: 0.50     Years: 20.00     Pack years: 10.00     Types: Cigarettes     Quit date: 10/6/1978     Years since quittin.0    Smokeless tobacco: Never    Tobacco comments:     SOMOKE OFF AND ON   Substance Use Topics    Alcohol use: Not Currently     Comment: 1 drink at new years      Family History:  Family History   Problem Relation Age of Onset    Cancer Mother         lung    OSTEOARTHRITIS Mother     Heart Disease Father         pacer    OSTEOARTHRITIS Father     Cancer Father         SKIN - basal cell    Pacemaker Father     Heart Surgery Father         STENT    Colon Polyps Father         no firm diagnosis of any cancer    Diabetes Sister     Other Sister         \"SLIGHT MENTAL RETARDATION\" - aphasic    Colon Polyps Sister         hx of polyps, uncertain of cancer    Other Sister         \"GUILLIAN BARRE\"    Breast Cancer Sister 76    Cancer Sister 58        endometrial    No Known Problems Daughter         born imperforate anus    Anesth Problems Neg Hx       Medications: (reviewed)  Current Outpatient Medications   Medication Sig    multivit-min/iron/folic/lutein (CENTRUM SILVER WOMEN PO) Take  by mouth. omeg3/epa/dha/fish oil/flax/E (THERATEARS NUTRITION PO) Take  by mouth.    latanoprost (XALATAN) 0.005 % ophthalmic solution Administer 1 Drop to both eyes nightly. calcium carbonate (TUMS) 200 mg calcium (500 mg) chew Take 1 Tab by mouth daily. psyllium husk (METAMUCIL PO) Take 1 Tab by mouth nightly. vit A/vit C/vit E/zinc/copper (PRESERVISION AREDS PO) Take 1 Tab by mouth every fourty-eight (48) hours.     naproxen sodium (ALEVE) 220 mg cap Take  by mouth daily as needed. SF 5000 PLUS 1.1 % crea TOOTHPASTE    timolol (TIMOPTIC-XE) 0.25 % ophthalmic gel-forming Administer 1 Drop to both eyes daily. cholecalciferol (VITAMIN D3) 1,000 unit tablet Take 1,000 Units by mouth daily. No current facility-administered medications for this visit. Allergies: (reviewed)  Allergies   Allergen Reactions    Shellfish Containing Products Diarrhea    Aspirin Other (comments)     heartburn    Penicillins Rash     Had PCN many years ago and had a rash. Has had it once since then with no reaction. Patient requesting to have PCN removed. OBJECTIVE:    Physical Exam:  VITAL SIGNS: There were no vitals filed for this visit. There is no height or weight on file to calculate BMI. GENERAL SHAMEKA: WD, WN, WF in NAD   HEENT: WNL   RESPIRATORY: CTA   CARDIOVASC: RRR   GASTROINT: Soft, NT, ND   MUSCULOSKEL: WNL   EXTREMITIES: No edema   PELVIC: Normal external genitalia. Normal vagina, aside from radiation changes. No lesions. Uterus, cervix, and adnexa surgically absent. RECTAL: Deferred   EDEL SURVEY: Negative   NEURO: Grossly intact       Lab Data:    Lab Results   Component Value Date/Time    WBC 4.4 06/22/2022 08:29 AM    HGB 13.5 06/22/2022 08:29 AM    HCT 40.5 06/22/2022 08:29 AM    PLATELET 039 61/79/2762 08:29 AM    MCV 94 06/22/2022 08:29 AM     Lab Results   Component Value Date/Time    Sodium 138 06/22/2022 08:29 AM    Potassium 5.0 06/22/2022 08:29 AM    Chloride 99 06/22/2022 08:29 AM    CO2 26 06/22/2022 08:29 AM    Anion gap 5 11/28/2019 04:16 AM    Glucose 94 06/22/2022 08:29 AM    BUN 14 06/22/2022 08:29 AM    Creatinine 0.83 06/22/2022 08:29 AM    BUN/Creatinine ratio 17 06/22/2022 08:29 AM    GFR est AA >60 11/28/2019 04:16 AM    GFR est non-AA 52 (L) 11/28/2019 04:16 AM    Calcium 10.2 06/22/2022 08:29 AM         CT of chest/abdomen/pelvis (10/3/20)  Chest:  Lungs: There is a calcified granuloma within the right lower lobe.  Lungs are  otherwise clear. Lymph nodes: There is no mediastinal, hilar or axillary lymphadenopathy. There  are several calcified mediastinal and right hilar lymph nodes. Pleura: There is no prior study for direct comparison. Heart: The heart is normal in size and there is no pericardial fluid. Bones: No lytic or sclerotic osseous lesion is visualized. Abdomen/pelvis:  Liver: There is diffuse fatty infiltration of the liver. There is no suspicious  focal hepatic lesion. Spleen: The spleen is normal.     Pancreas: The pancreas is normal.     Adrenals: The adrenals are normal.     Gallbladder: There is a gallstone in the gallbladder; there is no CT evidence of  acute cholecystitis. Kidneys: There is a 2 mm non-obstructing left renal calculus. There are 2  subcentimeter left renal hypodensities, too small to further characterize, but  statistically likely to represent cysts. Note is made of a left retroaortic  renal vein. Lymph nodes\omentum\peritoneum. There is no marisa hepatis, mesenteric,  retroperitoneal or pelvic lymphadenopathy. No intra-abdominal soft tissue nodule  or mass is seen. Bowel: No dilated or thickened loop of large or small bowel is seen. Urinary bladder: Urinary bladder is partially filled and grossly normal.     Bones: There is beam hardening artifact in the pelvis related to bilateral hip  prostheses. No lytic or sclerotic osseous lesion is visualized. Miscellaneous: There is no free intraperitoneal gas or fluid. There is no focal  fluid collection to suggest abscess. Uterus is absent. The left and right  ovaries are not visualized, however there are two adjacent cysts within the left  hemipelvis, the larger of the two measures 2.3 cm x 2.2 cm. IMPRESSION: No evidence of metastatic disease within the chest, abdomen or  pelvis. Two adjacent cysts within the left hemipelvis, arising from the left  ovary.       PET/CT (1/27/21) - [facility]  Head and Neck: Physiologic radiotracer uptake is present within imaged portion of brain, adenoids, tonsils, salivary glands, and vocal cords. No hypermetabolic cervical adenopathy. There is increased radiotracer uptake in the extraocular muscles and tongue, likely due to activation. Thyroid gland is unremarkable. Chest: Heart size is normal. No pericardial effusion. Physiologic uptake is seen in the myocardium. Multiple calcified mediastinal and right hilar lymph nodes likely from prior granulomatous insult. No axillary adenopathy. Central airways are patent. No hypermetabolic lung parenchymal lesions. No focal consolidation. No pleural effusion or pneumothorax. Abdomen and Pelvis: There is physiologic FDG distribution with uniform uptake within liver and spleen. Large peripherally calcified gallstone in the body of the gallbladder. Pancreas is atrophic. No adrenal mass. Tracer excretion can be visualized in bilateral kidneys, ureters, and urinary bladder. Extensive streak artifact from bilateral hip arthroplasties limits assessment of pelvic structures. Status post WESLY and BSO. There is a hypermetabolic focus in the vaginal cuff with a max SUV of 7.0 (image 197). Redemonstrated photopenic bilobed cystic structure adjacent to the left external iliac vessels measuring 1.5 x 2.0 cm (image 162). There is a small to moderate-sized sliding-type hiatal hernia with layering debris in the lower esophagus and increased FDG uptake, max SUV 4.3 (image 67). Tracer distribution within the stomach and bowel appears. Physiologic. No hypermetabolic abdominopelvic lymphadenopathy. Bones and Soft Tissues: No suspicious focal abnormalities are identified within visualized osseous structures. Status post bilateral total hip arthroplasties. Multilevel degenerative changes of the visualized spine with chronic appearing compression deformity of the L1 vertebral body. Impression and Comments:   1. Final report.    2.  Limited assessment of the pelvic structures secondary to extensive streak artifact from the bilateral hip arthroplasties. 3.  Status post WESLY and BSO. There is focal increased FDG uptake in the right vaginal cuff which likely corresponds with area of known recurrence. 4.  No definite evidence of distant metastatic disease. 5.  Cystic structure adjacent to the left external iliac vessels does not demonstrate increased FDG uptake. Consider further evaluation with a pelvic ultrasound to better characterize. 6.  Small to moderate-sized sliding-type hiatal hernia with increased metabolic activity noted in the lower esophagus suggestive of reflux esophagitis. 7.  Stable appearance of multiple calcified mediastinal and hilar lymph nodes suggestive of prior granulomatous insult. CT of abdomen/pelvis (8/9/21) - [facility]  Lower thorax: Limited imaging of the lung bases showed no evidence of airspace disease, nodule, or pleural effusion. The heart size was normal. See chest CT report for complete chest findings. Liver: Normal size. Peripheral 5 mm hypodensity inferior right hepatic lobe, too small to characterize. Ill-defined 18 x 16 mm area of hypodensity adjacent to falciform ligament. May represent fatty change along falciform ligament however more prominent than on prior study still favor focal fat however metastatic lesion not excluded. MR would clarify. . No other focal lesions seen. Biliary tract: Large rim calcified 2.1 cm gallstone. No evidence of acute cholecystitis or dilated ducts. Pancreas: Normal size and contour. No dilated ducts. Spleen: Normal size. No defects. Adrenal glands: Normal.     Kidneys: 2.9 mm nonobstructing calculus left kidney, 13 mm cyst left kidney as well as subcentimeter hypodensities. Right kidney normal. No hydronephrosis or mass. Vascular structures: Scattered calcifications throughout abdominal aorta. No aneurysm. Major branch vessels patent.  Retroaortic left renal vein, anatomic variant. No calcifications and left common iliac artery with punctate calcification right common iliac artery. No calcifications and remainder of the iliac arteries which are patent bilaterally. Peritoneum and retroperitoneum: No evidence of free air or free fluid. Borderline enlarged celiac axis lymph node with no periaortic, mesenteric, or pelvic lymphadenopathy. GI tract: Small sliding hiatal hernia. Stomach otherwise normal. Duodenum and small bowel have a normal appearance. Sigmoid diverticulosis without diverticulitis. Remainder of colon has a normal appearance. Appendix identified and normal.     Pelvis: Artifact over pelvis from hip replacements. Status post hysterectomy. Bladder distended and appears to be normal. Bilobular left adnexal mass measuring 4.9 x 2.2 cm, similar to prior. Bones and soft tissues: Bilateral total hip replacements. Artifact and pelvis from prostheses. Prostheses appear to be in satisfactory position. Degenerative changes of both acetabula and both sacroiliac joints. Degenerative disc disease L5-S1 and L4-L5 as well as L2-L3. Compression fracture L1, unchanged. Scoliosis. No suspicious bony lesion. CONCLUSIONS:   1. Status post hysterectomy Artifact over lower pelvis in area of vaginal cuff excludes complete evaluation of this area. No definite pelvic mass. Bilobular cystic left adnexal mass. Stable compared to prior. Follow-up with ultrasound. 2. Ill-defined 18 x 16 mm hypodensity adjacent to falciform ligament in liver. May be area of focal fat, favor, however more distinct and slightly larger on this study. Metastatic focus can't be excluded. MR would clarify. 3. Gallstone without acute cholecystectomy   4. No adenopathy. 5. Left renal cysts, stable. 6. Diverticulosis without diverticulitis. 7. Bilateral total hip replacements.  Degenerative changes lumbosacral spine with altered level degenerative disc disease, compression fracture, chronic, L1 and scoliosis. CT of abdomen/pelvis (12/10/21) - [facility]  Lower thorax: Limited imaging of the lung bases showed no evidence of airspace disease, nodule, or pleural effusion. The heart size was normal. See chest CT report for complete chest findings. Liver: Normal size. Peripheral 5 mm hypodensity inferior right hepatic lobe, too small to characterize. Ill-defined 18 x 16 mm area of hypodensity adjacent to falciform ligament unchanged, most likely representing focal fatty infiltration. No other focal lesions seen. Biliary tract: Large rim calcified 1.5 cm gallstone. No evidence of acute cholecystitis or dilated ducts. Pancreas: Normal size and contour. No dilated ducts. Spleen: Normal size. No defects. Adrenal glands: Normal.     Kidneys: 2.9 mm nonobstructing calculus left kidney, 13 mm cyst left kidney as well as subcentimeter hypodensities. Right kidney normal. No hydronephrosis or mass. Vascular structures: Scattered calcifications throughout abdominal aorta. No aneurysm. Major branch vessels patent. Retroaortic left renal vein, anatomic variant. No calcifications and left common iliac artery with punctate calcification right common iliac artery. No calcifications and remainder of the iliac arteries which are patent bilaterally. Peritoneum and retroperitoneum: No evidence of free air or free fluid. No enlarged lymph nodes in the abdomen or in the pelvis. GI tract: Small sliding hiatal hernia. Stomach otherwise normal. Duodenum and small bowel have a normal appearance. Sigmoid diverticulosis without diverticulitis. Remainder of colon has a normal appearance. Appendix identified and normal.     Status post hysterectomy. Bladder is under distended and appears to be normal. Bilobular left adnexal cystic mass measuring 4.9 x 2.2 cm on image #53 of series 7, similar to prior. Bones and soft tissues: Bilateral total hip replacements.  Prostheses appear to be in satisfactory position. Degenerative changes of both acetabula and both sacroiliac joints. Degenerative disc disease L5-S1 and L4-L5 as well as L2-L3. Compression fracture L1, unchanged. Scoliosis. No suspicious bony lesion. CONCLUSIONS:   1.  No definite evidence of tumor recurrence in the pelvis or metastasis to the abdomen or to the pelvis. 2.  Status post hysterectomy. Stable cystic mass in the left adnexa. 3.  Stable ill-defined hypodensity at adjacent to the falciform ligament of the liver most likely representing focal fatty infiltration. 4. Gallstone without acute cholecystectomy   5. No abdominal or pelvic free fluid or lymphadenopathy   6. Bilateral total hip replacements. Degenerative changes lumbosacral spine with altered level degenerative disc disease, compression fracture, chronic, L1 and scoliosis. CT of abdomen/pelvis (7/19/22) - [facility]  Lung Bases: The visualized heart is within normal size. The lung bases are clear. Distal esophagus is patulous with retained oral contrast. Mild mosaic attenuation of the lung bases. Liver: The liver is normal in size and morphology. No suspicious liver lesion. Likely focal fat along the falciform ligament, similar to prior. A subcentimeter right lobe anterior segment hypodensity remains too small to characterize (series 3/137). Gallbladder and Bile Ducts:  Cholelithiasis is noted. Otherwise the gallbladder appears within normal limits. No biliary ductal dilatation. Pancreas: The pancreas is normal in appearance. No pancreatic ductal dilatation. Spleen:  Spleen is within normal size without suspicious focal abnormality. Small calcified splenic granuloma. Adrenals: No adrenal mass. Mild nodular thickening of the left adrenal gland is similar to prior. Kidneys, Collecting systems: Stable left renal cyst. Subcentimeter renal hypodensities are too small to characterize, stable. Punctate nonobstructing left renal calculus is again identified.  No hydronephrosis or perinephric stranding. No suspicious renal mass. Evaluation of pelvic structures is limited by streak artifact from bilateral total hip arthroplasties. Urinary Bladder: No definite focal bladder abnormality. Reproductive Organs: Status post hysterectomy. Visualized vaginal cuff shows no acute abnormalities. Stable bilobed cystic focus measuring up to 4.7 x 2 cm, along the left pelvic sidewall/external iliac veins. Peritoneum, Mesentery, Retroperitoneum: No free or loculated fluid in the abdomen or pelvis. No pneumoperitoneum. No retroperitoneal hematoma or soft tissue mass. Nodes: No lymphadenopathy by size criteria. Bowel: No evidence of mechanical intestinal obstruction. No definite focal bowel wall thickening. Appendix shows no acute abnormalities, located in the midline of the midabdomen (series 3/191). Vasculature: Portal, splenic, and superior mesenteric veins are patent. No abdominal aortic aneurysm. Retroaortic left renal vein is noted, anatomic variant. Moderate to possibly severe atherosclerotic narrowing in the SMA in the proximal portion with maintained patency (series 3/130). Replaced right hepatic artery arising from SMA, anatomic variant. Soft tissues: No acute abnormality. Bone windows: No suspicious osseous lesion. 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 Bilateral total hip arthroplasties are noted. 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 Multilevel spondylosis of the spine noted. L1 vertebral body deformity is again noted. IMPRESSION:   1. Status post hysterectomy. 2.  No evidence of recurrent disease or metastasis in the abdomen or pelvis. 3.   Left pelvic sidewall lobulated cystic focus, likely unchanged from prior. 4.  Chronic L1 vertebral body compression deformity. 5.  Patulous distal esophagus with retained oral contrast. Correlate for esophageal dysmotility. 6.  Cholelithiasis.    7.  Punctate nonobstructing left renal calculus. 8.  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 9.  Additional findings as described above. IMPRESSION/PLAN:  Demetrio Mcleod is a 74 y.o. female with a diagnosis of stage IA, grade 3, endometrial carcinoma. Following her RATLH, BSO, SPLND, she received vaginal brachytherapy at [facility]. She subsequently developed an isolated vaginal cuff recurrence in September 2020. She was recommended pelvic radiation therapy by myself and multiple other consulting physicians. She ultimately completed whole pelvic radiation therapy at [facility]. Her most recent CT in July 2022 showed no evidence of disease. She has no evidence of disease on today's exam.  I will plan on seeing her back in 3 months. We will discuss repeat imaging at that time. An electronic signature was used to sign this note.     Kavitha Riley MD  10/25/22

## 2022-12-13 DIAGNOSIS — Z78.0 ASYMPTOMATIC MENOPAUSAL STATE: ICD-10-CM

## 2023-01-17 ENCOUNTER — OFFICE VISIT (OUTPATIENT)
Dept: GYNECOLOGY | Age: 73
End: 2023-01-17
Payer: MEDICARE

## 2023-01-17 VITALS
BODY MASS INDEX: 25.05 KG/M2 | HEART RATE: 64 BPM | HEIGHT: 67 IN | DIASTOLIC BLOOD PRESSURE: 81 MMHG | SYSTOLIC BLOOD PRESSURE: 163 MMHG

## 2023-01-17 DIAGNOSIS — C54.1 ENDOMETRIAL CANCER (HCC): Primary | ICD-10-CM

## 2023-01-17 PROCEDURE — 1090F PRES/ABSN URINE INCON ASSESS: CPT | Performed by: OBSTETRICS & GYNECOLOGY

## 2023-01-17 PROCEDURE — G8427 DOCREV CUR MEDS BY ELIG CLIN: HCPCS | Performed by: OBSTETRICS & GYNECOLOGY

## 2023-01-17 PROCEDURE — G0463 HOSPITAL OUTPT CLINIC VISIT: HCPCS | Performed by: OBSTETRICS & GYNECOLOGY

## 2023-01-17 PROCEDURE — 3077F SYST BP >= 140 MM HG: CPT | Performed by: OBSTETRICS & GYNECOLOGY

## 2023-01-17 PROCEDURE — G8417 CALC BMI ABV UP PARAM F/U: HCPCS | Performed by: OBSTETRICS & GYNECOLOGY

## 2023-01-17 PROCEDURE — 99213 OFFICE O/P EST LOW 20 MIN: CPT | Performed by: OBSTETRICS & GYNECOLOGY

## 2023-01-17 PROCEDURE — G8399 PT W/DXA RESULTS DOCUMENT: HCPCS | Performed by: OBSTETRICS & GYNECOLOGY

## 2023-01-17 PROCEDURE — 1101F PT FALLS ASSESS-DOCD LE1/YR: CPT | Performed by: OBSTETRICS & GYNECOLOGY

## 2023-01-17 PROCEDURE — 3079F DIAST BP 80-89 MM HG: CPT | Performed by: OBSTETRICS & GYNECOLOGY

## 2023-01-17 PROCEDURE — 1123F ACP DISCUSS/DSCN MKR DOCD: CPT | Performed by: OBSTETRICS & GYNECOLOGY

## 2023-01-17 PROCEDURE — 3017F COLORECTAL CA SCREEN DOC REV: CPT | Performed by: OBSTETRICS & GYNECOLOGY

## 2023-01-17 PROCEDURE — G8536 NO DOC ELDER MAL SCRN: HCPCS | Performed by: OBSTETRICS & GYNECOLOGY

## 2023-01-17 PROCEDURE — G8432 DEP SCR NOT DOC, RNG: HCPCS | Performed by: OBSTETRICS & GYNECOLOGY

## 2023-01-17 NOTE — PROGRESS NOTES
[facility] [address], [address]  P [phone]  F [phone]    Office Note  Patient ID:  Name:  Melody Garcia  MRN:  [id number]  :  1950/72 y.o. Date:  2023      HISTORY OF PRESENT ILLNESS:  Melody Garcia is a 74 y.o.  postmenopausal female who is an established patient with a diagnosis of stage IA, grade 3 endometrial cancer. She underwent a robotic assisted TLH, BSO, SPLND in 2019. She had minimal invasion, no LVSI, and negative washings. I did not recommend any adjuvant therapy, though I did discuss her risks of recurrence. She underwent genetic testing and had a VUS of the SMAD4 gene, while negative for any other mutations. She did have a second opinion at [address] [place] [address] with Dr. Grace Flores, and ultimately underwent vaginal cuff brachytherapy there, which she completed in 2020. She presented in 2020 for follow-up. She stated that she was enrolled in a clinical trial at [address] [place] [address] on the effects of the use of vaginal dilators. She was last seen there a few months prior. She was without complaints. She denied any vaginal bleeding outside of when she uses the dilator, but that is not every time. She denied pelvic or abdominal pain. On pelvic exam I noted a small friable nodular area on the left anterolateral vagina measuring about 1.5 cm. I performed a biopsy of this suspicious lesion. The mass was almost removed completely with the biopsy. FINAL PATHOLOGIC DIAGNOSIS   Left upper vaginal wall, biopsy:   Metastatic adenocarcinoma, compatible with endometrial primary (see comment)   Comment   Immunochemical stains are performed. Tumor cells are positive for ER, KY, and p16 (patchy) while negative for CEA and Napsin A. Given the patient's history, morphologic findings, and immunohistochemical staining pattern the above diagnosis is rendered.      I ordered a CT of the chest/abdomen/pelvis to evaluate. It did not demonstrate any other disease. I recommended radiation therapy and suggested that she follow-up at [facility] with her radiation oncologist there, Dr. Alfredo Meyers. She presented subsequently to discuss treatment options, as she was not enthusiastic about pelvic radiation therapy. She also stated she had another opinion scheduled with Dr. Jonas Blanchard at [facility] for the following day. He ordered a PET/CT that showed no evidence of disease outside of the pelvis. He essentially told her the same thing and recommended pelvic radiation therapy. Her referred her to Dr. Araceli Thomas with [facility] for treatment. Following those consultation she went back to see Dr. Wagner Zarate at [facility], as well as Dr. Seth Deal at [facility], for additional consultation. She stated that she and Dr. Сергей Marte \"didn't mesh\". She presented to see me in January 2021 to ask my opinion on treatment and for my recommendations. I again explained to her that radiation therapy would be the standard of care. Surgical excision of the area might be an option, but still would not eliminate the need for radiation. I again explained that I didn't think chemotherapy, immunotherapy, or hormonal therapy would be good options, especially with radiation likely being a curative treatment. I was still not sure why she was so opposed to radiation and I again tried to alleviate her concerns regarding the treatment. I stressed that she needed to get started on therapy as soon as possible, as we had known about this recurrence since late September. I stressed that every day she delayed treatment she was decreasing her chances at salvage. She had another PET/CT in February 2021 at [facility] prior to her radiation, which was negative. She ultimately completed pelvic radiation therapy at [facility] with Dr. Wagner Zarate, which she completed in April 2021. She presents today for follow-up. Her last CT in July 2022 at [facility] was negative.         ROS:   and GI review: Negative  Cardiopulmonary review:  Negative   Musculoskeletal:  Negative    A comprehensive review of systems was negative except for that written in the History of Present Illness. , 10 point ROS      Problem List:  Patient Active Problem List    Diagnosis Date Noted    DDD (degenerative disc disease), lumbar 07/20/2022    Compression fracture of L1 vertebra, initial encounter ([facility] [address] 07/20/2022    Endometrial cancer ([facility] [address] 07/18/2022    Chronic bilateral low back pain, unspecified whether sciatica present 06/17/2022    Chronic bilateral thoracic back pain 06/17/2022    Neuralgia 06/17/2022    Changes in vascular appearance of retina, unspecified laterality 06/17/2022    Fatigue, unspecified type 06/17/2022    Hypercholesteremia 06/17/2022    History of endometrial cancer 11/27/2019    Sinus bradycardia by electrocardiogram 11/21/2019    Pseudophakia of both eyes 05/29/2018    White coat syndrome without diagnosis of hypertension 08/07/2017    Living will, counseling/discussion 08/07/2017    Primary open angle glaucoma (POAG) 12/19/2016    Nuclear nonsenile cataract 12/19/2016    Vaccination refused by patient 10/06/2016    Colonoscopy refused 10/06/2016    Primary insomnia 10/06/2016    Essential hypertension     Glaucoma     Degenerative joint disease (DJD) of lumbar spine     GERD (gastroesophageal reflux disease)     History of rheumatic fever     PUD (peptic ulcer disease)      PMH:  Past Medical History:   Diagnosis Date    BCC (basal cell carcinoma)     Cancer ([facility] [address] 10/2019    UTERINE    Degenerative joint disease (DJD) of lumbar spine     GERD (gastroesophageal reflux disease)     Glaucoma     Glaucoma     Both eyes    Headache     Nausea & vomiting     PUD (peptic ulcer disease)     \"MANY YEARS AGO\"    Rheumatic fever     AS CHILD    Sinus bradycardia by electrocardiogram 11/21/2019      PSH:  Past Surgical History:   Procedure Laterality Date    HX COLPOSCOPY      abn pap    HX HEENT      SHAMA CATARACTS    HX HIP REPLACEMENT      x2    HX HYSTERECTOMY  2019    Dr. Lorie Medrano    HX ORTHOPAEDIC      LEFT ELBOW FOR FX X2    HX OTHER SURGICAL      DOG BIT SURGERY ON RIGHT HAND    HX WISDOM TEETH EXTRACTION        Social History:  Social History     Tobacco Use    Smoking status: Former     Packs/day: 0.50     Years: 20.00     Pack years: 10.00     Types: Cigarettes     Quit date: 10/6/1978     Years since quittin.3    Smokeless tobacco: Never    Tobacco comments:     SOMOKE OFF AND ON   Substance Use Topics    Alcohol use: Not Currently     Comment: 1 drink at new years      Family History:  Family History   Problem Relation Age of Onset    Cancer Mother         lung    OSTEOARTHRITIS Mother     Heart Disease Father         pacer    OSTEOARTHRITIS Father     Cancer Father         SKIN - basal cell    Pacemaker Father     Heart Surgery Father         STENT    Colon Polyps Father         no firm diagnosis of any cancer    Diabetes Sister     Other Sister         \"SLIGHT MENTAL RETARDATION\" - aphasic    Colon Polyps Sister         hx of polyps, uncertain of cancer    Other Sister         \"GUILLIAN BARRE\"    Breast Cancer Sister 76    Cancer Sister 58        endometrial    No Known Problems Daughter         born imperforate anus    Anesth Problems Neg Hx       Medications: (reviewed)  Current Outpatient Medications   Medication Sig    multivit-min/iron/folic/lutein (CENTRUM SILVER WOMEN PO) Take  by mouth. omeg3/epa/dha/fish oil/flax/E (THERATEARS NUTRITION PO) Take  by mouth.    latanoprost (XALATAN) 0.005 % ophthalmic solution Administer 1 Drop to both eyes nightly. calcium carbonate (TUMS) 200 mg calcium (500 mg) chew Take 1 Tab by mouth daily. psyllium husk (METAMUCIL PO) Take 1 Tab by mouth nightly. vit A/vit C/vit E/zinc/copper (PRESERVISION AREDS PO) Take 1 Tab by mouth every fourty-eight (48) hours.     naproxen sodium 220 mg cap Take  by mouth daily as needed. SF 5000 PLUS 1.1 % crea TOOTHPASTE    timolol (TIMOPTIC-XE) 0.25 % ophthalmic gel-forming Administer 1 Drop to both eyes daily. cholecalciferol (VITAMIN D3) (1000 Units /25 mcg) tablet Take 1,000 Units by mouth daily. No current facility-administered medications for this visit. Allergies: (reviewed)  Allergies   Allergen Reactions    Shellfish Containing Products Diarrhea    Aspirin Other (comments)     heartburn    Penicillins Rash     Had PCN many years ago and had a rash. Has had it once since then with no reaction. Patient requesting to have PCN removed. OBJECTIVE:    Physical Exam:  VITAL SIGNS: Vitals:    01/17/23 1422   BP: (!) 163/81   Pulse: 64   Height: 5' 7.01\" (1.702 m)       Body mass index is 25.05 kg/m². GENERAL SHAMEKA: WD, WN, WF in NAD   HEENT: WNL   RESPIRATORY: CTA   CARDIOVASC: RRR   GASTROINT: Soft, NT, ND   MUSCULOSKEL: WNL   EXTREMITIES: No edema   PELVIC: Normal external genitalia. Normal vagina, aside from radiation changes. No lesions. Uterus, cervix, and adnexa surgically absent. RECTAL: Deferred   EDEL SURVEY: Negative   NEURO: Grossly intact       Lab Data:    Lab Results   Component Value Date/Time    WBC 4.4 06/22/2022 08:29 AM    HGB 13.5 06/22/2022 08:29 AM    HCT 40.5 06/22/2022 08:29 AM    PLATELET 882 99/87/5667 08:29 AM    MCV 94 06/22/2022 08:29 AM     Lab Results   Component Value Date/Time    Sodium 138 06/22/2022 08:29 AM    Potassium 5.0 06/22/2022 08:29 AM    Chloride 99 06/22/2022 08:29 AM    CO2 26 06/22/2022 08:29 AM    Anion gap 5 11/28/2019 04:16 AM    Glucose 94 06/22/2022 08:29 AM    BUN 14 06/22/2022 08:29 AM    Creatinine 0.83 06/22/2022 08:29 AM    BUN/Creatinine ratio 17 06/22/2022 08:29 AM    GFR est AA >60 11/28/2019 04:16 AM    GFR est non-AA 52 (L) 11/28/2019 04:16 AM    Calcium 10.2 06/22/2022 08:29 AM         CT of chest/abdomen/pelvis (10/3/20)  Chest:  Lungs:  There is a calcified granuloma within the right lower lobe. Lungs are  otherwise clear. Lymph nodes: There is no mediastinal, hilar or axillary lymphadenopathy. There  are several calcified mediastinal and right hilar lymph nodes. Pleura: There is no prior study for direct comparison. Heart: The heart is normal in size and there is no pericardial fluid. Bones: No lytic or sclerotic osseous lesion is visualized. Abdomen/pelvis:  Liver: There is diffuse fatty infiltration of the liver. There is no suspicious  focal hepatic lesion. Spleen: The spleen is normal.     Pancreas: The pancreas is normal.     Adrenals: The adrenals are normal.     Gallbladder: There is a gallstone in the gallbladder; there is no CT evidence of  acute cholecystitis. Kidneys: There is a 2 mm non-obstructing left renal calculus. There are 2  subcentimeter left renal hypodensities, too small to further characterize, but  statistically likely to represent cysts. Note is made of a left retroaortic  renal vein. Lymph nodes\omentum\peritoneum. There is no marisa hepatis, mesenteric,  retroperitoneal or pelvic lymphadenopathy. No intra-abdominal soft tissue nodule  or mass is seen. Bowel: No dilated or thickened loop of large or small bowel is seen. Urinary bladder: Urinary bladder is partially filled and grossly normal.     Bones: There is beam hardening artifact in the pelvis related to bilateral hip  prostheses. No lytic or sclerotic osseous lesion is visualized. Miscellaneous: There is no free intraperitoneal gas or fluid. There is no focal  fluid collection to suggest abscess. Uterus is absent. The left and right  ovaries are not visualized, however there are two adjacent cysts within the left  hemipelvis, the larger of the two measures 2.3 cm x 2.2 cm. IMPRESSION: No evidence of metastatic disease within the chest, abdomen or  pelvis. Two adjacent cysts within the left hemipelvis, arising from the left  ovary.       PET/CT (1/27/21) - [facility]  Head and Neck: Physiologic radiotracer uptake is present within imaged portion of brain, adenoids, tonsils, salivary glands, and vocal cords. No hypermetabolic cervical adenopathy. There is increased radiotracer uptake in the extraocular muscles and tongue, likely due to activation. Thyroid gland is unremarkable. Chest: Heart size is normal. No pericardial effusion. Physiologic uptake is seen in the myocardium. Multiple calcified mediastinal and right hilar lymph nodes likely from prior granulomatous insult. No axillary adenopathy. Central airways are patent. No hypermetabolic lung parenchymal lesions. No focal consolidation. No pleural effusion or pneumothorax. Abdomen and Pelvis: There is physiologic FDG distribution with uniform uptake within liver and spleen. Large peripherally calcified gallstone in the body of the gallbladder. Pancreas is atrophic. No adrenal mass. Tracer excretion can be visualized in bilateral kidneys, ureters, and urinary bladder. Extensive streak artifact from bilateral hip arthroplasties limits assessment of pelvic structures. Status post WESLY and BSO. There is a hypermetabolic focus in the vaginal cuff with a max SUV of 7.0 (image 197). Redemonstrated photopenic bilobed cystic structure adjacent to the left external iliac vessels measuring 1.5 x 2.0 cm (image 162). There is a small to moderate-sized sliding-type hiatal hernia with layering debris in the lower esophagus and increased FDG uptake, max SUV 4.3 (image 67). Tracer distribution within the stomach and bowel appears. Physiologic. No hypermetabolic abdominopelvic lymphadenopathy. Bones and Soft Tissues: No suspicious focal abnormalities are identified within visualized osseous structures. Status post bilateral total hip arthroplasties. Multilevel degenerative changes of the visualized spine with chronic appearing compression deformity of the L1 vertebral body. Impression and Comments:   1. Final report.    2.  Limited assessment of the pelvic structures secondary to extensive streak artifact from the bilateral hip arthroplasties. 3.  Status post WESLY and BSO. There is focal increased FDG uptake in the right vaginal cuff which likely corresponds with area of known recurrence. 4.  No definite evidence of distant metastatic disease. 5.  Cystic structure adjacent to the left external iliac vessels does not demonstrate increased FDG uptake. Consider further evaluation with a pelvic ultrasound to better characterize. 6.  Small to moderate-sized sliding-type hiatal hernia with increased metabolic activity noted in the lower esophagus suggestive of reflux esophagitis. 7.  Stable appearance of multiple calcified mediastinal and hilar lymph nodes suggestive of prior granulomatous insult. CT of abdomen/pelvis (8/9/21) - [facility]  Lower thorax: Limited imaging of the lung bases showed no evidence of airspace disease, nodule, or pleural effusion. The heart size was normal. See chest CT report for complete chest findings. Liver: Normal size. Peripheral 5 mm hypodensity inferior right hepatic lobe, too small to characterize. Ill-defined 18 x 16 mm area of hypodensity adjacent to falciform ligament. May represent fatty change along falciform ligament however more prominent than on prior study still favor focal fat however metastatic lesion not excluded. MR would clarify. . No other focal lesions seen. Biliary tract: Large rim calcified 2.1 cm gallstone. No evidence of acute cholecystitis or dilated ducts. Pancreas: Normal size and contour. No dilated ducts. Spleen: Normal size. No defects. Adrenal glands: Normal.     Kidneys: 2.9 mm nonobstructing calculus left kidney, 13 mm cyst left kidney as well as subcentimeter hypodensities. Right kidney normal. No hydronephrosis or mass. Vascular structures: Scattered calcifications throughout abdominal aorta. No aneurysm. Major branch vessels patent.  Retroaortic left renal vein, anatomic variant. No calcifications and left common iliac artery with punctate calcification right common iliac artery. No calcifications and remainder of the iliac arteries which are patent bilaterally. Peritoneum and retroperitoneum: No evidence of free air or free fluid. Borderline enlarged celiac axis lymph node with no periaortic, mesenteric, or pelvic lymphadenopathy. GI tract: Small sliding hiatal hernia. Stomach otherwise normal. Duodenum and small bowel have a normal appearance. Sigmoid diverticulosis without diverticulitis. Remainder of colon has a normal appearance. Appendix identified and normal.     Pelvis: Artifact over pelvis from hip replacements. Status post hysterectomy. Bladder distended and appears to be normal. Bilobular left adnexal mass measuring 4.9 x 2.2 cm, similar to prior. Bones and soft tissues: Bilateral total hip replacements. Artifact and pelvis from prostheses. Prostheses appear to be in satisfactory position. Degenerative changes of both acetabula and both sacroiliac joints. Degenerative disc disease L5-S1 and L4-L5 as well as L2-L3. Compression fracture L1, unchanged. Scoliosis. No suspicious bony lesion. CONCLUSIONS:   1. Status post hysterectomy Artifact over lower pelvis in area of vaginal cuff excludes complete evaluation of this area. No definite pelvic mass. Bilobular cystic left adnexal mass. Stable compared to prior. Follow-up with ultrasound. 2. Ill-defined 18 x 16 mm hypodensity adjacent to falciform ligament in liver. May be area of focal fat, favor, however more distinct and slightly larger on this study. Metastatic focus can't be excluded. MR would clarify. 3. Gallstone without acute cholecystectomy   4. No adenopathy. 5. Left renal cysts, stable. 6. Diverticulosis without diverticulitis. 7. Bilateral total hip replacements.  Degenerative changes lumbosacral spine with altered level degenerative disc disease, compression fracture, chronic, L1 and scoliosis. CT of abdomen/pelvis (12/10/21) - [facility]  Lower thorax: Limited imaging of the lung bases showed no evidence of airspace disease, nodule, or pleural effusion. The heart size was normal. See chest CT report for complete chest findings. Liver: Normal size. Peripheral 5 mm hypodensity inferior right hepatic lobe, too small to characterize. Ill-defined 18 x 16 mm area of hypodensity adjacent to falciform ligament unchanged, most likely representing focal fatty infiltration. No other focal lesions seen. Biliary tract: Large rim calcified 1.5 cm gallstone. No evidence of acute cholecystitis or dilated ducts. Pancreas: Normal size and contour. No dilated ducts. Spleen: Normal size. No defects. Adrenal glands: Normal.     Kidneys: 2.9 mm nonobstructing calculus left kidney, 13 mm cyst left kidney as well as subcentimeter hypodensities. Right kidney normal. No hydronephrosis or mass. Vascular structures: Scattered calcifications throughout abdominal aorta. No aneurysm. Major branch vessels patent. Retroaortic left renal vein, anatomic variant. No calcifications and left common iliac artery with punctate calcification right common iliac artery. No calcifications and remainder of the iliac arteries which are patent bilaterally. Peritoneum and retroperitoneum: No evidence of free air or free fluid. No enlarged lymph nodes in the abdomen or in the pelvis. GI tract: Small sliding hiatal hernia. Stomach otherwise normal. Duodenum and small bowel have a normal appearance. Sigmoid diverticulosis without diverticulitis. Remainder of colon has a normal appearance. Appendix identified and normal.     Status post hysterectomy. Bladder is under distended and appears to be normal. Bilobular left adnexal cystic mass measuring 4.9 x 2.2 cm on image #53 of series 7, similar to prior. Bones and soft tissues: Bilateral total hip replacements.  Prostheses appear to be in satisfactory position. Degenerative changes of both acetabula and both sacroiliac joints. Degenerative disc disease L5-S1 and L4-L5 as well as L2-L3. Compression fracture L1, unchanged. Scoliosis. No suspicious bony lesion. CONCLUSIONS:   1.  No definite evidence of tumor recurrence in the pelvis or metastasis to the abdomen or to the pelvis. 2.  Status post hysterectomy. Stable cystic mass in the left adnexa. 3.  Stable ill-defined hypodensity at adjacent to the falciform ligament of the liver most likely representing focal fatty infiltration. 4. Gallstone without acute cholecystectomy   5. No abdominal or pelvic free fluid or lymphadenopathy   6. Bilateral total hip replacements. Degenerative changes lumbosacral spine with altered level degenerative disc disease, compression fracture, chronic, L1 and scoliosis. CT of abdomen/pelvis (7/19/22) - [facility]  Lung Bases: The visualized heart is within normal size. The lung bases are clear. Distal esophagus is patulous with retained oral contrast. Mild mosaic attenuation of the lung bases. Liver: The liver is normal in size and morphology. No suspicious liver lesion. Likely focal fat along the falciform ligament, similar to prior. A subcentimeter right lobe anterior segment hypodensity remains too small to characterize (series 3/137). Gallbladder and Bile Ducts:  Cholelithiasis is noted. Otherwise the gallbladder appears within normal limits. No biliary ductal dilatation. Pancreas: The pancreas is normal in appearance. No pancreatic ductal dilatation. Spleen:  Spleen is within normal size without suspicious focal abnormality. Small calcified splenic granuloma. Adrenals: No adrenal mass. Mild nodular thickening of the left adrenal gland is similar to prior. Kidneys, Collecting systems: Stable left renal cyst. Subcentimeter renal hypodensities are too small to characterize, stable.  Punctate nonobstructing left renal calculus is again identified. No hydronephrosis or perinephric stranding. No suspicious renal mass. Evaluation of pelvic structures is limited by streak artifact from bilateral total hip arthroplasties. Urinary Bladder: No definite focal bladder abnormality. Reproductive Organs: Status post hysterectomy. Visualized vaginal cuff shows no acute abnormalities. Stable bilobed cystic focus measuring up to 4.7 x 2 cm, along the left pelvic sidewall/external iliac veins. Peritoneum, Mesentery, Retroperitoneum: No free or loculated fluid in the abdomen or pelvis. No pneumoperitoneum. No retroperitoneal hematoma or soft tissue mass. Nodes: No lymphadenopathy by size criteria. Bowel: No evidence of mechanical intestinal obstruction. No definite focal bowel wall thickening. Appendix shows no acute abnormalities, located in the midline of the midabdomen (series 3/191). Vasculature: Portal, splenic, and superior mesenteric veins are patent. No abdominal aortic aneurysm. Retroaortic left renal vein is noted, anatomic variant. Moderate to possibly severe atherosclerotic narrowing in the SMA in the proximal portion with maintained patency (series 3/130). Replaced right hepatic artery arising from SMA, anatomic variant. Soft tissues: No acute abnormality. Bone windows: No suspicious osseous lesion. 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 Bilateral total hip arthroplasties are noted. 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 Multilevel spondylosis of the spine noted. L1 vertebral body deformity is again noted. IMPRESSION:   1. Status post hysterectomy. 2.  No evidence of recurrent disease or metastasis in the abdomen or pelvis. 3.   Left pelvic sidewall lobulated cystic focus, likely unchanged from prior. 4.  Chronic L1 vertebral body compression deformity. 5.  Patulous distal esophagus with retained oral contrast. Correlate for esophageal dysmotility. 6.  Cholelithiasis.    7.  Punctate nonobstructing left renal calculus. 8.  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 9.  Additional findings as described above. IMPRESSION/PLAN:  Sukhjinder Baig is a 74 y.o. female with a diagnosis of stage IA, grade 3, endometrial carcinoma. Following her RATLH, BSO, SPLND, she received vaginal brachytherapy at [facility]. She subsequently developed an isolated vaginal cuff recurrence in September 2020. She was recommended pelvic radiation therapy by myself and multiple other consulting physicians. She ultimately completed whole pelvic radiation therapy at [facility]. Her most recent CT in July 2022 showed no evidence of disease. She has no evidence of disease on today's exam.  We will repeat a CT at this time. If negative, I will see her back in 3 months for continued surveillance. An electronic signature was used to sign this note.     Dianelys Mayfield MD  01/17/23

## 2023-01-17 NOTE — PROGRESS NOTES
Three month check up. Pt states no abnormal spotting, bleeding or pain. 1. Have you been to the ER, urgent care clinic since your last visit? Hospitalized since your last visit?no    2. Have you seen or consulted any other health care providers outside of the [address] [place] [address] since your last visit? Include any pap smears or colon screening.  no

## 2023-02-09 ENCOUNTER — VIRTUAL VISIT (OUTPATIENT)
Dept: GYNECOLOGY | Age: 73
End: 2023-02-09
Payer: MEDICARE

## 2023-02-09 DIAGNOSIS — C54.1 ENDOMETRIAL CANCER (HCC): Primary | ICD-10-CM

## 2023-02-09 PROCEDURE — G0463 HOSPITAL OUTPT CLINIC VISIT: HCPCS | Performed by: OBSTETRICS & GYNECOLOGY

## 2023-02-09 PROCEDURE — 3017F COLORECTAL CA SCREEN DOC REV: CPT | Performed by: OBSTETRICS & GYNECOLOGY

## 2023-02-09 PROCEDURE — G8427 DOCREV CUR MEDS BY ELIG CLIN: HCPCS | Performed by: OBSTETRICS & GYNECOLOGY

## 2023-02-09 PROCEDURE — 1123F ACP DISCUSS/DSCN MKR DOCD: CPT | Performed by: OBSTETRICS & GYNECOLOGY

## 2023-02-09 PROCEDURE — 99213 OFFICE O/P EST LOW 20 MIN: CPT | Performed by: OBSTETRICS & GYNECOLOGY

## 2023-02-09 PROCEDURE — 1090F PRES/ABSN URINE INCON ASSESS: CPT | Performed by: OBSTETRICS & GYNECOLOGY

## 2023-02-09 PROCEDURE — G8432 DEP SCR NOT DOC, RNG: HCPCS | Performed by: OBSTETRICS & GYNECOLOGY

## 2023-02-09 PROCEDURE — G8399 PT W/DXA RESULTS DOCUMENT: HCPCS | Performed by: OBSTETRICS & GYNECOLOGY

## 2023-02-09 PROCEDURE — 1101F PT FALLS ASSESS-DOCD LE1/YR: CPT | Performed by: OBSTETRICS & GYNECOLOGY

## 2023-02-09 NOTE — PROGRESS NOTES
[facility] [address], [address]  P [phone]  F [phone]    Office Note  Patient ID:  Name:  Ramos Zhou  MRN:  [id number]  :  1950/72 y.o. Date:  2023      HISTORY OF PRESENT ILLNESS:  Ramos Zhou is a 74 y.o.  postmenopausal female who is an established patient with a diagnosis of stage IA, grade 3 endometrial cancer. She underwent a robotic assisted TLH, BSO, SPLND in 2019. She had minimal invasion, no LVSI, and negative washings. I did not recommend any adjuvant therapy, though I did discuss her risks of recurrence. She underwent genetic testing and had a VUS of the SMAD4 gene, while negative for any other mutations. She did have a second opinion at [facility] with Dr. Ochoa García, and ultimately underwent vaginal cuff brachytherapy there, which she completed in 2020. She presented in 2020 for follow-up. She stated that she was enrolled in a clinical trial at [facility] on the effects of the use of vaginal dilators. She was last seen there a few months prior. She was without complaints. She denied any vaginal bleeding outside of when she uses the dilator, but that is not every time. She denied pelvic or abdominal pain. On pelvic exam I noted a small friable nodular area on the left anterolateral vagina measuring about 1.5 cm. I performed a biopsy of this suspicious lesion. The mass was almost removed completely with the biopsy. FINAL PATHOLOGIC DIAGNOSIS   Left upper vaginal wall, biopsy:   Metastatic adenocarcinoma, compatible with endometrial primary (see comment)   Comment   Immunochemical stains are performed. Tumor cells are positive for ER, WV, and p16 (patchy) while negative for CEA and Napsin A. Given the patient's history, morphologic findings, and immunohistochemical staining pattern the above diagnosis is rendered.      I ordered a CT of the chest/abdomen/pelvis to Please see below encounter   evaluate. It did not demonstrate any other disease. I recommended radiation therapy and suggested that she follow-up at [facility] with her radiation oncologist there, Dr. Paty Dunbar. She presented subsequently to discuss treatment options, as she was not enthusiastic about pelvic radiation therapy. She also stated she had another opinion scheduled with Dr. Ankur Lopez at [facility] for the following day. He ordered a PET/CT that showed no evidence of disease outside of the pelvis. He essentially told her the same thing and recommended pelvic radiation therapy. Her referred her to Dr. Mellissa Mujica with [facility] for treatment. Following those consultation she went back to see Dr. Austen Daniels at [facility], as well as Dr. Navjot Hui at [facility], for additional consultation. She stated that she and Dr. Yvetta Buerger \"didn't mesh\". She presented to see me in January 2021 to ask my opinion on treatment and for my recommendations. I again explained to her that radiation therapy would be the standard of care. Surgical excision of the area might be an option, but still would not eliminate the need for radiation. I again explained that I didn't think chemotherapy, immunotherapy, or hormonal therapy would be good options, especially with radiation likely being a curative treatment. I was still not sure why she was so opposed to radiation and I again tried to alleviate her concerns regarding the treatment. I stressed that she needed to get started on therapy as soon as possible, as we had known about this recurrence since late September. I stressed that every day she delayed treatment she was decreasing her chances at salvage. She had another PET/CT in February 2021 at [facility] prior to her radiation, which was negative. She ultimately completed pelvic radiation therapy at [facility] with Dr. Austen Daniels, which she completed in April 2021. She presented recently for follow-up. Her last prior CT in July 2022 at [facility] was negative.   I opted to send her for another CT.  She presents today for those results. ROS:   and GI review:  Negative  Cardiopulmonary review:  Negative   Musculoskeletal:  Negative    A comprehensive review of systems was negative except for that written in the History of Present Illness. , 10 point ROS      Problem List:  Patient Active Problem List    Diagnosis Date Noted    DDD (degenerative disc disease), lumbar 07/20/2022    Compression fracture of L1 vertebra, initial encounter ([facility] [address] 07/20/2022    Endometrial cancer ([facility] [address] 07/18/2022    Chronic bilateral low back pain, unspecified whether sciatica present 06/17/2022    Chronic bilateral thoracic back pain 06/17/2022    Neuralgia 06/17/2022    Changes in vascular appearance of retina, unspecified laterality 06/17/2022    Fatigue, unspecified type 06/17/2022    Hypercholesteremia 06/17/2022    History of endometrial cancer 11/27/2019    Sinus bradycardia by electrocardiogram 11/21/2019    Pseudophakia of both eyes 05/29/2018    White coat syndrome without diagnosis of hypertension 08/07/2017    Living will, counseling/discussion 08/07/2017    Primary open angle glaucoma (POAG) 12/19/2016    Nuclear nonsenile cataract 12/19/2016    Vaccination refused by patient 10/06/2016    Colonoscopy refused 10/06/2016    Primary insomnia 10/06/2016    Essential hypertension     Glaucoma     Degenerative joint disease (DJD) of lumbar spine     GERD (gastroesophageal reflux disease)     History of rheumatic fever     PUD (peptic ulcer disease)      PMH:  Past Medical History:   Diagnosis Date    BCC (basal cell carcinoma)     Cancer ([facility] [address] 10/2019    UTERINE    Degenerative joint disease (DJD) of lumbar spine     GERD (gastroesophageal reflux disease)     Glaucoma     Glaucoma     Both eyes    Headache     Nausea & vomiting     PUD (peptic ulcer disease)     \"MANY YEARS AGO\"    Rheumatic fever     AS CHILD    Sinus bradycardia by electrocardiogram 11/21/2019      PSH:  Past Surgical History:   Procedure Laterality Date    HX COLPOSCOPY      abn pap    HX HEENT      SHAMA CATARACTS    HX HIP REPLACEMENT      x2    HX HYSTERECTOMY  2019    Dr. Matthew Joyce X2    HX OTHER SURGICAL      DOG BIT SURGERY ON RIGHT HAND    HX WISDOM TEETH EXTRACTION        Social History:  Social History     Tobacco Use    Smoking status: Former     Packs/day: 0.50     Years: 20.00     Pack years: 10.00     Types: Cigarettes     Quit date: 10/6/1978     Years since quittin.3    Smokeless tobacco: Never    Tobacco comments:     SOMOKE OFF AND ON   Substance Use Topics    Alcohol use: Not Currently     Comment: 1 drink at new years      Family History:  Family History   Problem Relation Age of Onset    Cancer Mother         lung    OSTEOARTHRITIS Mother     Heart Disease Father         pacer    OSTEOARTHRITIS Father     Cancer Father         SKIN - basal cell    Pacemaker Father     Heart Surgery Father         STENT    Colon Polyps Father         no firm diagnosis of any cancer    Diabetes Sister     Other Sister         \"SLIGHT MENTAL RETARDATION\" - aphasic    Colon Polyps Sister         hx of polyps, uncertain of cancer    Other Sister         \"GUILLIAN BARRE\"    Breast Cancer Sister 76    Cancer Sister 58        endometrial    No Known Problems Daughter         born imperforate anus    Anesth Problems Neg Hx       Medications: (reviewed)  Current Outpatient Medications   Medication Sig    multivit-min/iron/folic/lutein (CENTRUM SILVER WOMEN PO) Take  by mouth. omeg3/epa/dha/fish oil/flax/E (THERATEARS NUTRITION PO) Take  by mouth.    latanoprost (XALATAN) 0.005 % ophthalmic solution Administer 1 Drop to both eyes nightly. calcium carbonate (TUMS) 200 mg calcium (500 mg) chew Take 1 Tab by mouth daily. psyllium husk (METAMUCIL PO) Take 1 Tab by mouth nightly.     vit A/vit C/vit E/zinc/copper (PRESERVISION AREDS PO) Take 1 Tab by mouth every fourty-eight (48) hours. naproxen sodium 220 mg cap Take  by mouth daily as needed. SF 5000 PLUS 1.1 % crea TOOTHPASTE    timolol (TIMOPTIC-XE) 0.25 % ophthalmic gel-forming Administer 1 Drop to both eyes daily. cholecalciferol (VITAMIN D3) (1000 Units /25 mcg) tablet Take 1,000 Units by mouth daily. No current facility-administered medications for this visit. Allergies: (reviewed)  Allergies   Allergen Reactions    Shellfish Containing Products Diarrhea    Aspirin Other (comments)     heartburn    Penicillins Rash     Had PCN many years ago and had a rash. Has had it once since then with no reaction. Patient requesting to have PCN removed. OBJECTIVE:    Physical Exam:  VITAL SIGNS: There were no vitals filed for this visit. There is no height or weight on file to calculate BMI. GENERAL SHAMEKA:    HEENT:    RESPIRATORY:    CARDIOVASC:    GASTROINT:    MUSCULOSKEL:    EXTREMITIES:    PELVIC:    RECTAL:    EDEL SURVEY:    NEURO:        Lab Data:    Lab Results   Component Value Date/Time    WBC 4.4 06/22/2022 08:29 AM    HGB 13.5 06/22/2022 08:29 AM    HCT 40.5 06/22/2022 08:29 AM    PLATELET 879 93/76/1753 08:29 AM    MCV 94 06/22/2022 08:29 AM     Lab Results   Component Value Date/Time    Sodium 138 06/22/2022 08:29 AM    Potassium 5.0 06/22/2022 08:29 AM    Chloride 99 06/22/2022 08:29 AM    CO2 26 06/22/2022 08:29 AM    Anion gap 5 11/28/2019 04:16 AM    Glucose 94 06/22/2022 08:29 AM    BUN 14 06/22/2022 08:29 AM    Creatinine 0.83 06/22/2022 08:29 AM    BUN/Creatinine ratio 17 06/22/2022 08:29 AM    GFR est AA >60 11/28/2019 04:16 AM    GFR est non-AA 52 (L) 11/28/2019 04:16 AM    Calcium 10.2 06/22/2022 08:29 AM         CT of chest/abdomen/pelvis (10/3/20)  Chest:  Lungs: There is a calcified granuloma within the right lower lobe. Lungs are  otherwise clear. Lymph nodes: There is no mediastinal, hilar or axillary lymphadenopathy.  There  are several calcified mediastinal and right hilar lymph nodes. Pleura: There is no prior study for direct comparison. Heart: The heart is normal in size and there is no pericardial fluid. Bones: No lytic or sclerotic osseous lesion is visualized. Abdomen/pelvis:  Liver: There is diffuse fatty infiltration of the liver. There is no suspicious  focal hepatic lesion. Spleen: The spleen is normal.     Pancreas: The pancreas is normal.     Adrenals: The adrenals are normal.     Gallbladder: There is a gallstone in the gallbladder; there is no CT evidence of  acute cholecystitis. Kidneys: There is a 2 mm non-obstructing left renal calculus. There are 2  subcentimeter left renal hypodensities, too small to further characterize, but  statistically likely to represent cysts. Note is made of a left retroaortic  renal vein. Lymph nodes\omentum\peritoneum. There is no marisa hepatis, mesenteric,  retroperitoneal or pelvic lymphadenopathy. No intra-abdominal soft tissue nodule  or mass is seen. Bowel: No dilated or thickened loop of large or small bowel is seen. Urinary bladder: Urinary bladder is partially filled and grossly normal.     Bones: There is beam hardening artifact in the pelvis related to bilateral hip  prostheses. No lytic or sclerotic osseous lesion is visualized. Miscellaneous: There is no free intraperitoneal gas or fluid. There is no focal  fluid collection to suggest abscess. Uterus is absent. The left and right  ovaries are not visualized, however there are two adjacent cysts within the left  hemipelvis, the larger of the two measures 2.3 cm x 2.2 cm. IMPRESSION: No evidence of metastatic disease within the chest, abdomen or  pelvis. Two adjacent cysts within the left hemipelvis, arising from the left  ovary. PET/CT (1/27/21) - [facility]  Head and Neck: Physiologic radiotracer uptake is present within imaged portion of brain, adenoids, tonsils, salivary glands, and vocal cords.  No hypermetabolic cervical adenopathy. There is increased radiotracer uptake in the extraocular muscles and tongue, likely due to activation. Thyroid gland is unremarkable. Chest: Heart size is normal. No pericardial effusion. Physiologic uptake is seen in the myocardium. Multiple calcified mediastinal and right hilar lymph nodes likely from prior granulomatous insult. No axillary adenopathy. Central airways are patent. No hypermetabolic lung parenchymal lesions. No focal consolidation. No pleural effusion or pneumothorax. Abdomen and Pelvis: There is physiologic FDG distribution with uniform uptake within liver and spleen. Large peripherally calcified gallstone in the body of the gallbladder. Pancreas is atrophic. No adrenal mass. Tracer excretion can be visualized in bilateral kidneys, ureters, and urinary bladder. Extensive streak artifact from bilateral hip arthroplasties limits assessment of pelvic structures. Status post WESLY and BSO. There is a hypermetabolic focus in the vaginal cuff with a max SUV of 7.0 (image 197). Redemonstrated photopenic bilobed cystic structure adjacent to the left external iliac vessels measuring 1.5 x 2.0 cm (image 162). There is a small to moderate-sized sliding-type hiatal hernia with layering debris in the lower esophagus and increased FDG uptake, max SUV 4.3 (image 67). Tracer distribution within the stomach and bowel appears. Physiologic. No hypermetabolic abdominopelvic lymphadenopathy. Bones and Soft Tissues: No suspicious focal abnormalities are identified within visualized osseous structures. Status post bilateral total hip arthroplasties. Multilevel degenerative changes of the visualized spine with chronic appearing compression deformity of the L1 vertebral body. Impression and Comments:   1. Final report. 2.  Limited assessment of the pelvic structures secondary to extensive streak artifact from the bilateral hip arthroplasties.    3.  Status post WESLY and BSO. There is focal increased FDG uptake in the right vaginal cuff which likely corresponds with area of known recurrence. 4.  No definite evidence of distant metastatic disease. 5.  Cystic structure adjacent to the left external iliac vessels does not demonstrate increased FDG uptake. Consider further evaluation with a pelvic ultrasound to better characterize. 6.  Small to moderate-sized sliding-type hiatal hernia with increased metabolic activity noted in the lower esophagus suggestive of reflux esophagitis. 7.  Stable appearance of multiple calcified mediastinal and hilar lymph nodes suggestive of prior granulomatous insult. CT of abdomen/pelvis (8/9/21) - [facility]  Lower thorax: Limited imaging of the lung bases showed no evidence of airspace disease, nodule, or pleural effusion. The heart size was normal. See chest CT report for complete chest findings. Liver: Normal size. Peripheral 5 mm hypodensity inferior right hepatic lobe, too small to characterize. Ill-defined 18 x 16 mm area of hypodensity adjacent to falciform ligament. May represent fatty change along falciform ligament however more prominent than on prior study still favor focal fat however metastatic lesion not excluded. MR would clarify. . No other focal lesions seen. Biliary tract: Large rim calcified 2.1 cm gallstone. No evidence of acute cholecystitis or dilated ducts. Pancreas: Normal size and contour. No dilated ducts. Spleen: Normal size. No defects. Adrenal glands: Normal.     Kidneys: 2.9 mm nonobstructing calculus left kidney, 13 mm cyst left kidney as well as subcentimeter hypodensities. Right kidney normal. No hydronephrosis or mass. Vascular structures: Scattered calcifications throughout abdominal aorta. No aneurysm. Major branch vessels patent. Retroaortic left renal vein, anatomic variant. No calcifications and left common iliac artery with punctate calcification right common iliac artery.  No calcifications and remainder of the iliac arteries which are patent bilaterally. Peritoneum and retroperitoneum: No evidence of free air or free fluid. Borderline enlarged celiac axis lymph node with no periaortic, mesenteric, or pelvic lymphadenopathy. GI tract: Small sliding hiatal hernia. Stomach otherwise normal. Duodenum and small bowel have a normal appearance. Sigmoid diverticulosis without diverticulitis. Remainder of colon has a normal appearance. Appendix identified and normal.     Pelvis: Artifact over pelvis from hip replacements. Status post hysterectomy. Bladder distended and appears to be normal. Bilobular left adnexal mass measuring 4.9 x 2.2 cm, similar to prior. Bones and soft tissues: Bilateral total hip replacements. Artifact and pelvis from prostheses. Prostheses appear to be in satisfactory position. Degenerative changes of both acetabula and both sacroiliac joints. Degenerative disc disease L5-S1 and L4-L5 as well as L2-L3. Compression fracture L1, unchanged. Scoliosis. No suspicious bony lesion. CONCLUSIONS:   1. Status post hysterectomy Artifact over lower pelvis in area of vaginal cuff excludes complete evaluation of this area. No definite pelvic mass. Bilobular cystic left adnexal mass. Stable compared to prior. Follow-up with ultrasound. 2. Ill-defined 18 x 16 mm hypodensity adjacent to falciform ligament in liver. May be area of focal fat, favor, however more distinct and slightly larger on this study. Metastatic focus can't be excluded. MR would clarify. 3. Gallstone without acute cholecystectomy   4. No adenopathy. 5. Left renal cysts, stable. 6. Diverticulosis without diverticulitis. 7. Bilateral total hip replacements. Degenerative changes lumbosacral spine with altered level degenerative disc disease, compression fracture, chronic, L1 and scoliosis.        CT of abdomen/pelvis (12/10/21) - [facility]  Lower thorax: Limited imaging of the lung bases showed no evidence of airspace disease, nodule, or pleural effusion. The heart size was normal. See chest CT report for complete chest findings. Liver: Normal size. Peripheral 5 mm hypodensity inferior right hepatic lobe, too small to characterize. Ill-defined 18 x 16 mm area of hypodensity adjacent to falciform ligament unchanged, most likely representing focal fatty infiltration. No other focal lesions seen. Biliary tract: Large rim calcified 1.5 cm gallstone. No evidence of acute cholecystitis or dilated ducts. Pancreas: Normal size and contour. No dilated ducts. Spleen: Normal size. No defects. Adrenal glands: Normal.     Kidneys: 2.9 mm nonobstructing calculus left kidney, 13 mm cyst left kidney as well as subcentimeter hypodensities. Right kidney normal. No hydronephrosis or mass. Vascular structures: Scattered calcifications throughout abdominal aorta. No aneurysm. Major branch vessels patent. Retroaortic left renal vein, anatomic variant. No calcifications and left common iliac artery with punctate calcification right common iliac artery. No calcifications and remainder of the iliac arteries which are patent bilaterally. Peritoneum and retroperitoneum: No evidence of free air or free fluid. No enlarged lymph nodes in the abdomen or in the pelvis. GI tract: Small sliding hiatal hernia. Stomach otherwise normal. Duodenum and small bowel have a normal appearance. Sigmoid diverticulosis without diverticulitis. Remainder of colon has a normal appearance. Appendix identified and normal.     Status post hysterectomy. Bladder is under distended and appears to be normal. Bilobular left adnexal cystic mass measuring 4.9 x 2.2 cm on image #53 of series 7, similar to prior. Bones and soft tissues: Bilateral total hip replacements. Prostheses appear to be in satisfactory position. Degenerative changes of both acetabula and both sacroiliac joints.  Degenerative disc disease L5-S1 and L4-L5 as well as L2-L3. Compression fracture L1, unchanged. Scoliosis. No suspicious bony lesion. CONCLUSIONS:   1.  No definite evidence of tumor recurrence in the pelvis or metastasis to the abdomen or to the pelvis. 2.  Status post hysterectomy. Stable cystic mass in the left adnexa. 3.  Stable ill-defined hypodensity at adjacent to the falciform ligament of the liver most likely representing focal fatty infiltration. 4. Gallstone without acute cholecystectomy   5. No abdominal or pelvic free fluid or lymphadenopathy   6. Bilateral total hip replacements. Degenerative changes lumbosacral spine with altered level degenerative disc disease, compression fracture, chronic, L1 and scoliosis. CT of abdomen/pelvis (7/19/22) - [facility]  Lung Bases: The visualized heart is within normal size. The lung bases are clear. Distal esophagus is patulous with retained oral contrast. Mild mosaic attenuation of the lung bases. Liver: The liver is normal in size and morphology. No suspicious liver lesion. Likely focal fat along the falciform ligament, similar to prior. A subcentimeter right lobe anterior segment hypodensity remains too small to characterize (series 3/137). Gallbladder and Bile Ducts:  Cholelithiasis is noted. Otherwise the gallbladder appears within normal limits. No biliary ductal dilatation. Pancreas: The pancreas is normal in appearance. No pancreatic ductal dilatation. Spleen:  Spleen is within normal size without suspicious focal abnormality. Small calcified splenic granuloma. Adrenals: No adrenal mass. Mild nodular thickening of the left adrenal gland is similar to prior. Kidneys, Collecting systems: Stable left renal cyst. Subcentimeter renal hypodensities are too small to characterize, stable. Punctate nonobstructing left renal calculus is again identified. No hydronephrosis or perinephric stranding. No suspicious renal mass.        Evaluation of pelvic structures is limited by streak artifact from bilateral total hip arthroplasties. Urinary Bladder: No definite focal bladder abnormality. Reproductive Organs: Status post hysterectomy. Visualized vaginal cuff shows no acute abnormalities. Stable bilobed cystic focus measuring up to 4.7 x 2 cm, along the left pelvic sidewall/external iliac veins. Peritoneum, Mesentery, Retroperitoneum: No free or loculated fluid in the abdomen or pelvis. No pneumoperitoneum. No retroperitoneal hematoma or soft tissue mass. Nodes: No lymphadenopathy by size criteria. Bowel: No evidence of mechanical intestinal obstruction. No definite focal bowel wall thickening. Appendix shows no acute abnormalities, located in the midline of the midabdomen (series 3/191). Vasculature: Portal, splenic, and superior mesenteric veins are patent. No abdominal aortic aneurysm. Retroaortic left renal vein is noted, anatomic variant. Moderate to possibly severe atherosclerotic narrowing in the SMA in the proximal portion with maintained patency (series 3/130). Replaced right hepatic artery arising from SMA, anatomic variant. Soft tissues: No acute abnormality. Bone windows: No suspicious osseous lesion. 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 Bilateral total hip arthroplasties are noted. 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 Multilevel spondylosis of the spine noted. L1 vertebral body deformity is again noted. IMPRESSION:   1. Status post hysterectomy. 2.  No evidence of recurrent disease or metastasis in the abdomen or pelvis. 3.   Left pelvic sidewall lobulated cystic focus, likely unchanged from prior. 4.  Chronic L1 vertebral body compression deformity. 5.  Patulous distal esophagus with retained oral contrast. Correlate for esophageal dysmotility. 6.  Cholelithiasis. 7.  Punctate nonobstructing left renal calculus. 8.  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    9.  Additional findings as described above. CT of abdomen/pelvis (1/25/23)  Limitations: Streak artifact from bilateral total hip arthroplasties limits evaluation of pelvic structures. Lung Bases: A dedicated chest CT has been performed and will be interpreted separately. Liver: The liver is normal in size and morphology. Similar appearance of focal fat along the falciform ligament. Stable punctate hypodensity in the anterior segment right hepatic lobe which remains too small to characterize (series 4 image 147). No suspicious liver lesion. Gallbladder and Bile Ducts:  Cholelithiasis. No associated inflammatory changes. No biliary ductal dilatation. Pancreas: The pancreas is normal in appearance. No pancreatic ductal dilatation. Spleen:  Spleen is within normal size. Tiny subcentimeter hypodensity in the inferior spleen is too small to characterize. Punctate calcific granuloma. Adrenals: No adrenal mass. Kidneys, Collecting systems: No hydronephrosis or perinephric stranding. Stable subcentimeter cortical hypodensities remain too small to characterize. No suspicious renal mass. Urinary Bladder: Limited evaluation. No definite focal bladder abnormality. Reproductive Organs: Status post hysterectomy. The visualized vaginal cuff is grossly unremarkable, though evaluation is limited by streak artifact from hemarthrosis. Stable appearance of a bilobed cystic focus along the left external iliac vessels which measures up to 2.0 x 1.7 x 4.4 cm, similar to prior. Peritoneum, Mesentery, Retroperitoneum: No free or loculated fluid in the abdomen or pelvis. No pneumoperitoneum. No retroperitoneal hematoma or soft tissue mass. Nodes: No lymphadenopathy by size criteria. Bowel: Small sliding hiatal hernia. No evidence of mechanical intestinal obstruction. No definite focal bowel wall thickening. The appendix is unremarkable and remains to the left of midline (series 4 image 163).      Vasculature: Portal, splenic, and superior mesenteric veins are patent. No abdominal aortic aneurysm. Atherosclerotic calcifications of the abdominal aorta and branch vessels. Retroaortic left renal vein. There again appears to be moderate to severe narrowing of the proximal SMA. Replaced right hepatic artery arising from the SMA. Soft tissues: Unremarkable. Bone windows: No suspicious osseous lesion. Multilevel spondylosis of the visualized spine. Bilateral total hip arthroplasties. 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 Compression deformity of L1 vertebral body appears similar to prior. IMPRESSION:   1. Status post hysterectomy. No definite evidence of recurrent disease or metastases within the abdomen or pelvis. 2.  No abdominopelvic lymphadenopathy. 3.  Stable bilobed cystic structure along the left external iliac vessels. 4.  Stable focal fat along the falciform ligament and a subcentimeter hypodensity in the right hepatic lobe which is too small to characterize. 5.  Cholelithiasis without CT evidence of acute cholecystitis. 6.  Remainder stable. 7.  Please see separate CT chest report. CT of chest (1/25/23)  Life-support devices and or cardiac hardware: None. Lower Neck: The thyroid is mildly atrophic but otherwise grossly unremarkable. Lymph Nodes: No enlarged axillary, hilar or mediastinal nodes by CT size criteria. Redemonstrated calcified mediastinal and right hilar lymph nodes. .     Pulmonary Parenchyma and Airways: The central airways are grossly patent. Mild dependent bibasilar hypoventilatory changes. No focal airspace disease. Several benign calcified granulomas are again seen. No suspicious pulmonary nodules are evident. Pleura: No pleural effusion or pneumothorax. Mediastinum: Normal heart size. No pericardial effusion. Ectatic ascending thoracic aorta measuring up to 3.5 cm, similar to prior.  The main pulmonary artery is mildly dilated but stable, measuring 3.2 cm in caliber, which can be seen with pulmonary hypertension. Esophagus: Esophagus is again patulous with retained oral contrast within the lumen, suggesting esophageal dysmotility and/or GE reflux. Small sliding-type hiatal hernia. Upper Abdomen: Please see separate abdominal and pelvic CT report. Chest Wall / Osseous Structures: No suspicious chest wall mass. Degenerative changes of the thoracic spine. No acute or aggressive osseous lesion. IMPRESSION:   1. No evidence of metastatic disease to the chest.   2.  No suspicious pulmonary nodules or thoracic lymphadenopathy. 3.  Patulous thoracic esophagus with retained oral contrast material which can be seen with dysmotility and/or gastroesophageal reflux. 4.  Please see separate CT abdomen/pelvis report. IMPRESSION/PLAN:  Tobin Graf is a 74 y.o. female with a diagnosis of stage IA, grade 3, endometrial carcinoma. Following her RATLH, BSO, SPLND, she received vaginal brachytherapy at [facility]. She subsequently developed an isolated vaginal cuff recurrence in September 2020. She was recommended pelvic radiation therapy by myself and multiple other consulting physicians. She ultimately completed whole pelvic radiation therapy at [facility]. Her latest CT shows no evidence of disease. I will see her back in 3 months for continued surveillance. Tobin Graf is a 74 y.o. female who was seen by synchronous (real-time) audio-video technology on 2/9/2023 for Results (Virtual visit. Follow up to review ct scan results. )        Assessment & Plan:   Diagnoses and all orders for this visit:    1. Endometrial cancer ([facility] [address]      I spent at least 15 minutes on this visit with this established patient. Subjective:       Prior to Admission medications    Medication Sig Start Date End Date Taking? Authorizing Provider   multivit-min/iron/folic/lutein (CENTRUM SILVER WOMEN PO) Take  by mouth.    Yes Provider, Historical omeg3/epa/dha/fish oil/flax/E (THERATEARS NUTRITION PO) Take  by mouth. Yes Provider, Historical   latanoprost (XALATAN) 0.005 % ophthalmic solution Administer 1 Drop to both eyes nightly. Yes Provider, Historical   calcium carbonate (TUMS) 200 mg calcium (500 mg) chew Take 1 Tab by mouth daily. Yes Provider, Historical   psyllium husk (METAMUCIL PO) Take 1 Tab by mouth nightly. Yes Provider, Historical   vit A/vit C/vit E/zinc/copper (PRESERVISION AREDS PO) Take 1 Tab by mouth every fourty-eight (48) hours. Yes Provider, Historical   naproxen sodium 220 mg cap Take  by mouth daily as needed. Yes Provider, Historical   SF 5000 PLUS 1.1 % crea TOOTHPASTE 9/18/19  Yes Provider, Historical   timolol (TIMOPTIC-XE) 0.25 % ophthalmic gel-forming Administer 1 Drop to both eyes daily. 9/21/16  Yes Provider, Historical   cholecalciferol (VITAMIN D3) (1000 Units /25 mcg) tablet Take 1,000 Units by mouth daily.    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    GERD (gastroesophageal reflux disease) K21.9    History of rheumatic fever Z86.79    PUD (peptic ulcer disease) K27.9    Vaccination refused by patient Z28.21    Colonoscopy refused Z53.20    Primary insomnia F51.01    White coat syndrome without diagnosis of hypertension R03.0    Living will, counseling/discussion Z71.89    Sinus bradycardia by electrocardiogram R00.1    History of endometrial cancer Z85.42    Primary open angle glaucoma (POAG) H40.1190    Nuclear 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    Pseudophakia of both eyes Z96.1    Chronic bilateral low back pain, unspecified whether sciatica present M54.50, G89.29    Chronic bilateral thoracic back pain M54.6, G89.29    Neuralgia M79.2    Changes in vascular appearance of retina, unspecified laterality H35.019    Fatigue, unspecified type R53.83    Hypercholesteremia E78.00 Endometrial cancer ([facility] [address] C54.1    DDD (degenerative disc disease), lumbar M51.36    Compression fracture of L1 vertebra, initial encounter ([facility] [address] S32.010A     Patient Active Problem List    Diagnosis Date Noted    DDD (degenerative disc disease), lumbar 07/20/2022    Compression fracture of L1 vertebra, initial encounter ([facility] [address] 07/20/2022    [facility] 07/18/2022    Chronic bilateral low back pain, unspecified whether sciatica present 06/17/2022    Chronic bilateral thoracic back pain 06/17/2022    Neuralgia 06/17/2022    Changes in vascular appearance of retina, unspecified laterality 06/17/2022    Fatigue, unspecified type 06/17/2022    Hypercholesteremia 06/17/2022    History of endometrial cancer 11/27/2019    Sinus bradycardia by electrocardiogram 11/21/2019    Pseudophakia of both eyes 05/29/2018    White coat syndrome without diagnosis of hypertension 08/07/2017    Living will, counseling/discussion 08/07/2017    Primary open angle glaucoma (POAG) 12/19/2016    Nuclear nonsenile cataract 12/19/2016    Vaccination refused by patient 10/06/2016    Colonoscopy refused 10/06/2016    Primary insomnia 10/06/2016    Essential hypertension     Glaucoma     Degenerative joint disease (DJD) of lumbar spine     GERD (gastroesophageal reflux disease)     History of rheumatic fever     PUD (peptic ulcer disease)      Current Outpatient Medications   Medication Sig Dispense Refill    multivit-min/iron/folic/lutein (CENTRUM SILVER WOMEN PO) Take  by mouth. omeg3/epa/dha/fish oil/flax/E (THERATEARS NUTRITION PO) Take  by mouth.      latanoprost (XALATAN) 0.005 % ophthalmic solution Administer 1 Drop to both eyes nightly. calcium carbonate (TUMS) 200 mg calcium (500 mg) chew Take 1 Tab by mouth daily. psyllium husk (METAMUCIL PO) Take 1 Tab by mouth nightly. vit A/vit C/vit E/zinc/copper (PRESERVISION AREDS PO) Take 1 Tab by mouth every fourty-eight (48) hours.       naproxen sodium 220 mg cap Take  by mouth daily as needed. SF 5000 PLUS 1.1 % crea TOOTHPASTE  4    timolol (TIMOPTIC-XE) 0.25 % ophthalmic gel-forming Administer 1 Drop to both eyes daily. 0    cholecalciferol (VITAMIN D3) (1000 Units /25 mcg) tablet Take 1,000 Units by mouth daily. Allergies   Allergen Reactions    Shellfish Containing Products Diarrhea    Aspirin Other (comments)     heartburn    Penicillins Rash     Had PCN many years ago and had a rash. Has had it once since then with no reaction. Patient requesting to have PCN removed.      Past Medical History:   Diagnosis Date    BCC (basal cell carcinoma)     Cancer ([facility] [address] 10/2019    UTERINE    Degenerative joint disease (DJD) of lumbar spine     GERD (gastroesophageal reflux disease)     Glaucoma     Glaucoma     Both eyes    Headache     Nausea & vomiting     PUD (peptic ulcer disease)     \"MANY YEARS AGO\"    Rheumatic fever     AS CHILD    Sinus bradycardia by electrocardiogram 11/21/2019     Past Surgical History:   Procedure Laterality Date    HX COLPOSCOPY      abn pap    HX HEENT      SHAMA CATARACTS    HX HIP REPLACEMENT      x2    HX HYSTERECTOMY  11/27/2019    Dr. Tobias Sutherland    HX ORTHOPAEDIC      SHAMA HIP REPLACEMENT    HX ORTHOPAEDIC      LEFT ELBOW FOR FX X2    HX OTHER SURGICAL      DOG BIT SURGERY ON RIGHT HAND    HX WISDOM TEETH EXTRACTION       Family History   Problem Relation Age of Onset    Cancer Mother         lung    OSTEOARTHRITIS Mother     Heart Disease Father         pacer    OSTEOARTHRITIS Father     Cancer Father         SKIN - basal cell    Pacemaker Father     Heart Surgery Father         STENT    Colon Polyps Father         no firm diagnosis of any cancer    Diabetes Sister     Other Sister         \"SLIGHT MENTAL RETARDATION\" - aphasic    Colon Polyps Sister         hx of polyps, uncertain of cancer    Other Sister         \"GUILLIAN BARRE\"    Breast Cancer Sister 76    Cancer Sister 58        endometrial    No Known Problems Daughter born imperforate anus    Anesth Problems Neg Hx      Social History     Tobacco Use    Smoking status: Former     Packs/day: 0.50     Years: 20.00     Pack years: 10.00     Types: Cigarettes     Quit date: 10/6/1978     Years since quittin.3    Smokeless tobacco: Never    Tobacco comments:     SOMOKE OFF AND ON   Substance Use Topics    Alcohol use: Not Currently     Comment: 1 drink at new years       ROS    Objective:   No flowsheet data found. General: alert, cooperative, no distress   Mental  status: normal mood, behavior, speech, dress, motor activity, and thought processes, able to follow commands   HENT: NCAT   Neck: no visualized mass   Resp: no respiratory distress   Neuro: no gross deficits   Skin: no discoloration or lesions of concern on visible areas   Psychiatric: normal affect, consistent with stated mood, no evidence of hallucinations     Additional exam findings: We discussed the expected course, resolution and complications of the diagnosis(es) in detail. Medication risks, benefits, costs, interactions, and alternatives were discussed as indicated. I advised her to contact the office if her condition worsens, changes or fails to improve as anticipated. She expressed understanding with the diagnosis(es) and plan. Mateusz Aponte, who was evaluated through a patient-initiated, synchronous (real-time) audio-video encounter, and/or her healthcare decision maker, is aware that it is a billable service, with coverage as determined by her insurance carrier. She provided verbal consent to proceed: Yes, and patient identification was verified. It was conducted pursuant to the emergency declaration under the [facility], [address] [place] [address] authority and the [employer] Act. A caregiver was present when appropriate. Ability to conduct physical exam was limited. I was in the office.  The patient was at home.          An electronic signature was used to sign this note.     Liz Kessler MD  02/09/23

## 2023-05-05 ENCOUNTER — TELEPHONE (OUTPATIENT)
Dept: GYNECOLOGY | Age: 73
End: 2023-05-05

## 2023-05-09 ENCOUNTER — OFFICE VISIT (OUTPATIENT)
Age: 73
End: 2023-05-09
Payer: MEDICARE

## 2023-05-09 VITALS
SYSTOLIC BLOOD PRESSURE: 148 MMHG | HEART RATE: 55 BPM | DIASTOLIC BLOOD PRESSURE: 78 MMHG | HEIGHT: 67 IN | BODY MASS INDEX: 24.89 KG/M2 | WEIGHT: 158.6 LBS

## 2023-05-09 DIAGNOSIS — C54.1 ENDOMETRIAL CANCER (HCC): Primary | ICD-10-CM

## 2023-05-09 PROCEDURE — 3017F COLORECTAL CA SCREEN DOC REV: CPT | Performed by: OBSTETRICS & GYNECOLOGY

## 2023-05-09 PROCEDURE — 1090F PRES/ABSN URINE INCON ASSESS: CPT | Performed by: OBSTETRICS & GYNECOLOGY

## 2023-05-09 PROCEDURE — 99213 OFFICE O/P EST LOW 20 MIN: CPT | Performed by: OBSTETRICS & GYNECOLOGY

## 2023-05-09 PROCEDURE — 3077F SYST BP >= 140 MM HG: CPT | Performed by: OBSTETRICS & GYNECOLOGY

## 2023-05-09 PROCEDURE — 1036F TOBACCO NON-USER: CPT | Performed by: OBSTETRICS & GYNECOLOGY

## 2023-05-09 PROCEDURE — G8420 CALC BMI NORM PARAMETERS: HCPCS | Performed by: OBSTETRICS & GYNECOLOGY

## 2023-05-09 PROCEDURE — 1123F ACP DISCUSS/DSCN MKR DOCD: CPT | Performed by: OBSTETRICS & GYNECOLOGY

## 2023-05-09 PROCEDURE — G8427 DOCREV CUR MEDS BY ELIG CLIN: HCPCS | Performed by: OBSTETRICS & GYNECOLOGY

## 2023-05-09 PROCEDURE — G8399 PT W/DXA RESULTS DOCUMENT: HCPCS | Performed by: OBSTETRICS & GYNECOLOGY

## 2023-05-09 PROCEDURE — 3078F DIAST BP <80 MM HG: CPT | Performed by: OBSTETRICS & GYNECOLOGY

## 2023-05-09 RX ORDER — LATANOPROST 50 UG/ML
SOLUTION/ DROPS OPHTHALMIC
COMMUNITY
Start: 2023-03-01

## 2023-05-09 RX ORDER — TIMOLOL MALEATE 5 MG/ML
SOLUTION OPHTHALMIC
COMMUNITY
Start: 2023-04-27

## 2023-05-09 RX ORDER — NYSTATIN 100000 U/G
OINTMENT TOPICAL
COMMUNITY
Start: 2023-04-25

## 2023-05-09 NOTE — PROGRESS NOTES
Three month check up for history of endometrial cancer. Pt states no abnormal spotting, bleeding or pain. 1. Have you been to the ER, urgent care clinic since your last visit? Hospitalized since your last visit?no    2. Have you seen or consulted any other health care providers outside of the [address] [place] [address] since your last visit? Include any pap smears or colon screening.  no
pap    HEENT      ANDREW CATARACTS    HYSTERECTOMY (CERVIX STATUS UNKNOWN)  2019    Dr. Natalia Michaels BIT SURGERY ON RIGHT HAND    TOTAL HIP ARTHROPLASTY      x2    WISDOM TOOTH EXTRACTION        Social History:  Social History     Tobacco Use    Smoking status: Former     Packs/day: 0.50     Types: Cigarettes     Quit date: 10/6/1978     Years since quittin.6    Smokeless tobacco: Never   Substance Use Topics    Alcohol use: Not Currently      Family History:  Family History   Problem Relation Age of Onset    No Known Problems Daughter         born imperforate anus    Anesth Problems Neg Hx     Cancer Sister 58        endometrial    Breast Cancer Sister 76    Other Sister         \"GUILLIAN BARRE\"    Colon Polyps Sister         hx of polyps, uncertain of cancer    Other Sister         \"SLIGHT MENTAL RETARDATION\" - aphasic    Diabetes Sister     Colon Polyps Father         no firm diagnosis of any cancer    Heart Surgery Father         STENT    Pacemaker Father     Cancer Father         SKIN - basal cell    Osteoarthritis Father     Heart Disease Father         pacer    Osteoarthritis Mother     Cancer Mother         lung      Medications: (reviewed)  Current Outpatient Medications   Medication Sig    nystatin (MYCOSTATIN) 180859 UNIT/GM ointment APPLY TO IRRITATED AREA OF BUTTOCKS TWICE DAILY    timolol (TIMOPTIC-XE) 0.5 % ophthalmic gel-forming INSTILL 1 DROP IN EACH EYE EVERY MORNING    latanoprost (XALATAN) 0.005 % ophthalmic solution INSTILL 1 DROP IN BOTH EYES EVERY EVENING    Multiple Vitamin (MULTIVITAMIN ADULT PO) Multivitamin 50 Plus    vitamin D (CHOLECALCIFEROL) 25 MCG (1000 UT) TABS tablet Take 1 tablet by mouth daily    Omega-3 Fatty Acids (OMEGA 3 PO) Omega 3    Multiple Vitamins-Minerals (PRESERVISION AREDS PO) Take by mouth    Sodium Fluoride (DENTA 5000 PLUS DT)

## 2023-07-18 ENCOUNTER — OFFICE VISIT (OUTPATIENT)
Age: 73
End: 2023-07-18
Payer: MEDICARE

## 2023-07-18 VITALS
OXYGEN SATURATION: 98 % | SYSTOLIC BLOOD PRESSURE: 150 MMHG | TEMPERATURE: 97.5 F | RESPIRATION RATE: 12 BRPM | BODY MASS INDEX: 24.96 KG/M2 | DIASTOLIC BLOOD PRESSURE: 70 MMHG | HEART RATE: 52 BPM | HEIGHT: 67 IN | WEIGHT: 159 LBS

## 2023-07-18 DIAGNOSIS — N39.3 STRESS INCONTINENCE OF URINE: ICD-10-CM

## 2023-07-18 DIAGNOSIS — I10 ESSENTIAL HYPERTENSION: Primary | ICD-10-CM

## 2023-07-18 DIAGNOSIS — L30.9 DERMATITIS: ICD-10-CM

## 2023-07-18 DIAGNOSIS — Z53.20 COLONOSCOPY REFUSED: ICD-10-CM

## 2023-07-18 PROCEDURE — 99214 OFFICE O/P EST MOD 30 MIN: CPT | Performed by: PHYSICIAN ASSISTANT

## 2023-07-18 PROCEDURE — 3077F SYST BP >= 140 MM HG: CPT | Performed by: PHYSICIAN ASSISTANT

## 2023-07-18 PROCEDURE — G8428 CUR MEDS NOT DOCUMENT: HCPCS | Performed by: PHYSICIAN ASSISTANT

## 2023-07-18 PROCEDURE — G8420 CALC BMI NORM PARAMETERS: HCPCS | Performed by: PHYSICIAN ASSISTANT

## 2023-07-18 PROCEDURE — G8399 PT W/DXA RESULTS DOCUMENT: HCPCS | Performed by: PHYSICIAN ASSISTANT

## 2023-07-18 PROCEDURE — 3078F DIAST BP <80 MM HG: CPT | Performed by: PHYSICIAN ASSISTANT

## 2023-07-18 PROCEDURE — 3017F COLORECTAL CA SCREEN DOC REV: CPT | Performed by: PHYSICIAN ASSISTANT

## 2023-07-18 PROCEDURE — 1036F TOBACCO NON-USER: CPT | Performed by: PHYSICIAN ASSISTANT

## 2023-07-18 PROCEDURE — 1123F ACP DISCUSS/DSCN MKR DOCD: CPT | Performed by: PHYSICIAN ASSISTANT

## 2023-07-18 PROCEDURE — 1090F PRES/ABSN URINE INCON ASSESS: CPT | Performed by: PHYSICIAN ASSISTANT

## 2023-07-18 SDOH — ECONOMIC STABILITY: FOOD INSECURITY: WITHIN THE PAST 12 MONTHS, YOU WORRIED THAT YOUR FOOD WOULD RUN OUT BEFORE YOU GOT MONEY TO BUY MORE.: NEVER TRUE

## 2023-07-18 SDOH — ECONOMIC STABILITY: FOOD INSECURITY: WITHIN THE PAST 12 MONTHS, THE FOOD YOU BOUGHT JUST DIDN'T LAST AND YOU DIDN'T HAVE MONEY TO GET MORE.: NEVER TRUE

## 2023-07-18 SDOH — ECONOMIC STABILITY: INCOME INSECURITY: HOW HARD IS IT FOR YOU TO PAY FOR THE VERY BASICS LIKE FOOD, HOUSING, MEDICAL CARE, AND HEATING?: NOT VERY HARD

## 2023-07-18 SDOH — ECONOMIC STABILITY: HOUSING INSECURITY
IN THE LAST 12 MONTHS, WAS THERE A TIME WHEN YOU DID NOT HAVE A STEADY PLACE TO SLEEP OR SLEPT IN A SHELTER (INCLUDING NOW)?: NO

## 2023-07-18 ASSESSMENT — ENCOUNTER SYMPTOMS
SHORTNESS OF BREATH: 0
COUGH: 0

## 2023-07-18 ASSESSMENT — PATIENT HEALTH QUESTIONNAIRE - PHQ9
SUM OF ALL RESPONSES TO PHQ QUESTIONS 1-9: 0
SUM OF ALL RESPONSES TO PHQ QUESTIONS 1-9: 0
2. FEELING DOWN, DEPRESSED OR HOPELESS: 0
SUM OF ALL RESPONSES TO PHQ QUESTIONS 1-9: 0
1. LITTLE INTEREST OR PLEASURE IN DOING THINGS: 0
SUM OF ALL RESPONSES TO PHQ9 QUESTIONS 1 & 2: 0
SUM OF ALL RESPONSES TO PHQ QUESTIONS 1-9: 0

## 2023-08-08 ENCOUNTER — OFFICE VISIT (OUTPATIENT)
Age: 73
End: 2023-08-08
Payer: MEDICARE

## 2023-08-08 VITALS
HEART RATE: 56 BPM | SYSTOLIC BLOOD PRESSURE: 159 MMHG | BODY MASS INDEX: 24.9 KG/M2 | DIASTOLIC BLOOD PRESSURE: 74 MMHG | HEIGHT: 67 IN

## 2023-08-08 DIAGNOSIS — C54.1 ENDOMETRIAL CANCER (HCC): Primary | ICD-10-CM

## 2023-08-08 PROCEDURE — G8420 CALC BMI NORM PARAMETERS: HCPCS | Performed by: OBSTETRICS & GYNECOLOGY

## 2023-08-08 PROCEDURE — G8427 DOCREV CUR MEDS BY ELIG CLIN: HCPCS | Performed by: OBSTETRICS & GYNECOLOGY

## 2023-08-08 PROCEDURE — 3017F COLORECTAL CA SCREEN DOC REV: CPT | Performed by: OBSTETRICS & GYNECOLOGY

## 2023-08-08 PROCEDURE — 3078F DIAST BP <80 MM HG: CPT | Performed by: OBSTETRICS & GYNECOLOGY

## 2023-08-08 PROCEDURE — 99212 OFFICE O/P EST SF 10 MIN: CPT | Performed by: OBSTETRICS & GYNECOLOGY

## 2023-08-08 PROCEDURE — G8399 PT W/DXA RESULTS DOCUMENT: HCPCS | Performed by: OBSTETRICS & GYNECOLOGY

## 2023-08-08 PROCEDURE — 1036F TOBACCO NON-USER: CPT | Performed by: OBSTETRICS & GYNECOLOGY

## 2023-08-08 PROCEDURE — 3077F SYST BP >= 140 MM HG: CPT | Performed by: OBSTETRICS & GYNECOLOGY

## 2023-08-08 PROCEDURE — 1090F PRES/ABSN URINE INCON ASSESS: CPT | Performed by: OBSTETRICS & GYNECOLOGY

## 2023-08-08 PROCEDURE — 1123F ACP DISCUSS/DSCN MKR DOCD: CPT | Performed by: OBSTETRICS & GYNECOLOGY

## 2024-02-13 ENCOUNTER — OFFICE VISIT (OUTPATIENT)
Age: 74
End: 2024-02-13
Payer: MEDICARE

## 2024-02-13 VITALS
DIASTOLIC BLOOD PRESSURE: 82 MMHG | HEIGHT: 67 IN | SYSTOLIC BLOOD PRESSURE: 146 MMHG | BODY MASS INDEX: 24.48 KG/M2 | HEART RATE: 59 BPM | WEIGHT: 156 LBS

## 2024-02-13 DIAGNOSIS — R32 URINARY INCONTINENCE, UNSPECIFIED TYPE: ICD-10-CM

## 2024-02-13 DIAGNOSIS — R10.11 RIGHT UPPER QUADRANT ABDOMINAL PAIN: ICD-10-CM

## 2024-02-13 DIAGNOSIS — C54.1 ENDOMETRIAL CANCER (HCC): Primary | ICD-10-CM

## 2024-02-13 PROCEDURE — G8399 PT W/DXA RESULTS DOCUMENT: HCPCS | Performed by: OBSTETRICS & GYNECOLOGY

## 2024-02-13 PROCEDURE — 99212 OFFICE O/P EST SF 10 MIN: CPT | Performed by: OBSTETRICS & GYNECOLOGY

## 2024-02-13 PROCEDURE — 0509F URINE INCON PLAN DOCD: CPT | Performed by: OBSTETRICS & GYNECOLOGY

## 2024-02-13 PROCEDURE — 3017F COLORECTAL CA SCREEN DOC REV: CPT | Performed by: OBSTETRICS & GYNECOLOGY

## 2024-02-13 PROCEDURE — 1123F ACP DISCUSS/DSCN MKR DOCD: CPT | Performed by: OBSTETRICS & GYNECOLOGY

## 2024-02-13 PROCEDURE — 1036F TOBACCO NON-USER: CPT | Performed by: OBSTETRICS & GYNECOLOGY

## 2024-02-13 PROCEDURE — G8420 CALC BMI NORM PARAMETERS: HCPCS | Performed by: OBSTETRICS & GYNECOLOGY

## 2024-02-13 PROCEDURE — 1090F PRES/ABSN URINE INCON ASSESS: CPT | Performed by: OBSTETRICS & GYNECOLOGY

## 2024-02-13 PROCEDURE — 3078F DIAST BP <80 MM HG: CPT | Performed by: OBSTETRICS & GYNECOLOGY

## 2024-02-13 PROCEDURE — 3077F SYST BP >= 140 MM HG: CPT | Performed by: OBSTETRICS & GYNECOLOGY

## 2024-02-13 PROCEDURE — G8427 DOCREV CUR MEDS BY ELIG CLIN: HCPCS | Performed by: OBSTETRICS & GYNECOLOGY

## 2024-02-13 PROCEDURE — G8484 FLU IMMUNIZE NO ADMIN: HCPCS | Performed by: OBSTETRICS & GYNECOLOGY

## 2024-02-13 RX ORDER — DORZOLAMIDE HYDROCHLORIDE AND TIMOLOL MALEATE 20; 5 MG/ML; MG/ML
SOLUTION/ DROPS OPHTHALMIC
COMMUNITY
Start: 2023-11-17

## 2024-02-13 NOTE — PROGRESS NOTES
[facility] GYNECOLOGIC ONCOLOGY  [address] [place] [address], [address]  [place] [address]  P [phone]  F [phone]    Office Note    Patient ID:  Name:  Simona Preciado  MRN:  [id number]  :  1950/73 y.o.  Date:  2024      HISTORY OF PRESENT ILLNESS:  Simona Preciado is a 73 y.o.  postmenopausal female who is an established patient with a diagnosis of stage IA, grade 3 endometrial cancer.  She underwent a robotic assisted TLH, BSO, SPLND in 2019.  She had minimal invasion, no LVSI, and negative washings.  I did not recommend any adjuvant therapy, though I did discuss her risks of recurrence.  She underwent genetic testing and had a VUS of the SMAD4 gene, while negative for any other mutations.  She did have a second opinion at [facility] with Dr. Marisela Pollack, and ultimately underwent vaginal cuff brachytherapy there, which she completed in 2020.    She presented in 2020 for follow-up.  She stated that she was enrolled in a clinical trial at [facility] on the effects of the use of vaginal dilators.  She was last seen there a few months prior.  She was without complaints.  She denied any vaginal bleeding outside of when she uses the dilator, but that is not every time.  She denied pelvic or abdominal pain.  On pelvic exam I noted a small friable nodular area on the left anterolateral vagina measuring about 1.5 cm.  I performed a biopsy of this suspicious lesion.  The mass was almost removed completely with the biopsy.     FINAL PATHOLOGIC DIAGNOSIS   Left upper vaginal wall, biopsy:   Metastatic adenocarcinoma, compatible with endometrial primary (see comment)   Comment   Immunochemical stains are performed. Tumor cells are positive for ER, CO, and p16 (patchy) while negative for CEA and Napsin A. Given the patient's history, morphologic findings, and immunohistochemical staining pattern the above diagnosis is rendered.     I ordered a CT of the chest/abdomen/pelvis to

## 2024-02-13 NOTE — PROGRESS NOTES
Six month check up. Pt states no abnormal spotting, bleeding or pain.   1. Have you been to the ER, urgent care clinic since your last visit?  Hospitalized since your last visit?no  2. Have you seen or consulted any other health care providers outside of the [facility] since your last visit?  Include any pap smears or colon screening. no

## 2024-03-19 ENCOUNTER — TELEMEDICINE (OUTPATIENT)
Age: 74
End: 2024-03-19
Payer: MEDICARE

## 2024-03-19 DIAGNOSIS — C54.1 ENDOMETRIAL CANCER (HCC): Primary | ICD-10-CM

## 2024-03-19 PROCEDURE — G8484 FLU IMMUNIZE NO ADMIN: HCPCS | Performed by: OBSTETRICS & GYNECOLOGY

## 2024-03-19 PROCEDURE — G8427 DOCREV CUR MEDS BY ELIG CLIN: HCPCS | Performed by: OBSTETRICS & GYNECOLOGY

## 2024-03-19 PROCEDURE — 1123F ACP DISCUSS/DSCN MKR DOCD: CPT | Performed by: OBSTETRICS & GYNECOLOGY

## 2024-03-19 PROCEDURE — 1090F PRES/ABSN URINE INCON ASSESS: CPT | Performed by: OBSTETRICS & GYNECOLOGY

## 2024-03-19 PROCEDURE — 1036F TOBACCO NON-USER: CPT | Performed by: OBSTETRICS & GYNECOLOGY

## 2024-03-19 PROCEDURE — G8399 PT W/DXA RESULTS DOCUMENT: HCPCS | Performed by: OBSTETRICS & GYNECOLOGY

## 2024-03-19 PROCEDURE — 99213 OFFICE O/P EST LOW 20 MIN: CPT | Performed by: OBSTETRICS & GYNECOLOGY

## 2024-03-19 PROCEDURE — G8420 CALC BMI NORM PARAMETERS: HCPCS | Performed by: OBSTETRICS & GYNECOLOGY

## 2024-03-19 NOTE — PROGRESS NOTES
Virtual visit. Review ct scan results.   
measuring 4.9 x 2.2 cm on image #53 of series 7, similar to prior.     Bones and soft tissues: Bilateral total hip replacements. Prostheses appear to be in satisfactory position. Degenerative changes of both acetabula and both sacroiliac joints. Degenerative disc disease L5-S1 and L4-L5 as well as L2-L3. Compression fracture L1, unchanged. Scoliosis. No suspicious bony lesion.     CONCLUSIONS:   1.  No definite evidence of tumor recurrence in the pelvis or metastasis to the abdomen or to the pelvis.   2.  Status post hysterectomy. Stable cystic mass in the left adnexa.   3.  Stable ill-defined hypodensity at adjacent to the falciform ligament of the liver most likely representing focal fatty infiltration.   4. Gallstone without acute cholecystectomy   5. No abdominal or pelvic free fluid or lymphadenopathy   6. Bilateral total hip replacements. Degenerative changes lumbosacral spine with altered level degenerative disc disease, compression fracture, chronic, L1 and scoliosis.       CT of abdomen/pelvis (7/19/22) - [facility]  Lung Bases: The visualized heart is within normal size. The lung bases are clear. Distal esophagus is patulous with retained oral contrast. Mild mosaic attenuation of the lung bases.     Liver: The liver is normal in size and morphology. No suspicious liver lesion. Likely focal fat along the falciform ligament, similar to prior. A subcentimeter right lobe anterior segment hypodensity remains too small to characterize (series 3/137).     Gallbladder and Bile Ducts:  Cholelithiasis is noted. Otherwise the gallbladder appears within normal limits.  No biliary ductal dilatation.     Pancreas: The pancreas is normal in appearance. No pancreatic ductal dilatation.     Spleen:  Spleen is within normal size without suspicious focal abnormality. Small calcified splenic granuloma.     Adrenals: No adrenal mass. Mild nodular thickening of the left adrenal gland is similar to prior.     Kidneys, Collecting

## 2024-09-17 ENCOUNTER — OFFICE VISIT (OUTPATIENT)
Age: 74
End: 2024-09-17
Payer: MEDICARE

## 2024-09-17 VITALS — BODY MASS INDEX: 24.43 KG/M2 | HEIGHT: 67 IN

## 2024-09-17 DIAGNOSIS — C54.1 ENDOMETRIAL CANCER (HCC): Primary | ICD-10-CM

## 2024-09-17 PROCEDURE — 1036F TOBACCO NON-USER: CPT | Performed by: OBSTETRICS & GYNECOLOGY

## 2024-09-17 PROCEDURE — 99212 OFFICE O/P EST SF 10 MIN: CPT | Performed by: OBSTETRICS & GYNECOLOGY

## 2024-09-17 PROCEDURE — G8427 DOCREV CUR MEDS BY ELIG CLIN: HCPCS | Performed by: OBSTETRICS & GYNECOLOGY

## 2024-09-17 PROCEDURE — 1123F ACP DISCUSS/DSCN MKR DOCD: CPT | Performed by: OBSTETRICS & GYNECOLOGY

## 2024-09-17 PROCEDURE — G8420 CALC BMI NORM PARAMETERS: HCPCS | Performed by: OBSTETRICS & GYNECOLOGY

## 2024-09-17 PROCEDURE — G8399 PT W/DXA RESULTS DOCUMENT: HCPCS | Performed by: OBSTETRICS & GYNECOLOGY

## 2024-09-17 PROCEDURE — 1090F PRES/ABSN URINE INCON ASSESS: CPT | Performed by: OBSTETRICS & GYNECOLOGY

## 2024-09-18 NOTE — ADDENDUM NOTE
Addended byDina Cushing on: 3/15/2022 10:41 AM     Modules accepted: Orders Patients wife called stating her  tested positive for covid today. Per the wife she was seen Monday with covid in the office.     Please advise

## 2025-03-17 NOTE — PROGRESS NOTES
[facility] GYNECOLOGIC ONCOLOGY  [address] [place] [address], [address]  [place] [address]  P [phone]  F [phone]    Office Note    Patient ID:  Name:  Simona Preciado  MRN:  [id number]  :  1950/75 y.o.  Date:  3/18/2025      HISTORY OF PRESENT ILLNESS:  Simnoa Preciado is a 75 y.o.  postmenopausal female who is an established patient with a diagnosis of stage IA, grade 3 endometrial cancer.  She underwent a robotic assisted TLH, BSO, SPLND in 2019.  She had minimal invasion, no LVSI, and negative washings.  I did not recommend any adjuvant therapy, though I did discuss her risks of recurrence.  She underwent genetic testing and had a VUS of the SMAD4 gene, while negative for any other mutations.  She did have a second opinion at [facility] with Dr. Marisela Pollack, and ultimately underwent vaginal cuff brachytherapy there, which she completed in 2020.    She presented in 2020 for follow-up.  She stated that she was enrolled in a clinical trial at [facility] on the effects of the use of vaginal dilators.  She was last seen there a few months prior.  She was without complaints.  She denied any vaginal bleeding outside of when she uses the dilator, but that is not every time.  She denied pelvic or abdominal pain.  On pelvic exam I noted a small friable nodular area on the left anterolateral vagina measuring about 1.5 cm.  I performed a biopsy of this suspicious lesion.  The mass was almost removed completely with the biopsy.     FINAL PATHOLOGIC DIAGNOSIS   Left upper vaginal wall, biopsy:   Metastatic adenocarcinoma, compatible with endometrial primary (see comment)   Comment   Immunochemical stains are performed. Tumor cells are positive for ER, ND, and p16 (patchy) while negative for CEA and Napsin A. Given the patient's history, morphologic findings, and immunohistochemical staining pattern the above diagnosis is rendered.     I ordered a CT of the chest/abdomen/pelvis to

## 2025-03-18 ENCOUNTER — CLINICAL DOCUMENTATION (OUTPATIENT)
Age: 75
End: 2025-03-18

## 2025-03-18 ENCOUNTER — OFFICE VISIT (OUTPATIENT)
Age: 75
End: 2025-03-18
Payer: MEDICARE

## 2025-03-18 VITALS
BODY MASS INDEX: 23.23 KG/M2 | SYSTOLIC BLOOD PRESSURE: 155 MMHG | DIASTOLIC BLOOD PRESSURE: 89 MMHG | HEART RATE: 67 BPM | HEIGHT: 67 IN | WEIGHT: 148 LBS

## 2025-03-18 DIAGNOSIS — C54.1 ENDOMETRIAL CANCER (HCC): Primary | ICD-10-CM

## 2025-03-18 PROCEDURE — 1159F MED LIST DOCD IN RCRD: CPT | Performed by: OBSTETRICS & GYNECOLOGY

## 2025-03-18 PROCEDURE — G8399 PT W/DXA RESULTS DOCUMENT: HCPCS | Performed by: OBSTETRICS & GYNECOLOGY

## 2025-03-18 PROCEDURE — G8420 CALC BMI NORM PARAMETERS: HCPCS | Performed by: OBSTETRICS & GYNECOLOGY

## 2025-03-18 PROCEDURE — 99212 OFFICE O/P EST SF 10 MIN: CPT | Performed by: OBSTETRICS & GYNECOLOGY

## 2025-03-18 PROCEDURE — 1036F TOBACCO NON-USER: CPT | Performed by: OBSTETRICS & GYNECOLOGY

## 2025-03-18 PROCEDURE — G8427 DOCREV CUR MEDS BY ELIG CLIN: HCPCS | Performed by: OBSTETRICS & GYNECOLOGY

## 2025-03-18 PROCEDURE — 1125F AMNT PAIN NOTED PAIN PRSNT: CPT | Performed by: OBSTETRICS & GYNECOLOGY

## 2025-03-18 PROCEDURE — 1160F RVW MEDS BY RX/DR IN RCRD: CPT | Performed by: OBSTETRICS & GYNECOLOGY

## 2025-03-18 PROCEDURE — 1090F PRES/ABSN URINE INCON ASSESS: CPT | Performed by: OBSTETRICS & GYNECOLOGY

## 2025-03-18 PROCEDURE — 3077F SYST BP >= 140 MM HG: CPT | Performed by: OBSTETRICS & GYNECOLOGY

## 2025-03-18 PROCEDURE — 3079F DIAST BP 80-89 MM HG: CPT | Performed by: OBSTETRICS & GYNECOLOGY

## 2025-03-18 PROCEDURE — 1123F ACP DISCUSS/DSCN MKR DOCD: CPT | Performed by: OBSTETRICS & GYNECOLOGY

## 2025-03-18 ASSESSMENT — PATIENT HEALTH QUESTIONNAIRE - PHQ9
SUM OF ALL RESPONSES TO PHQ QUESTIONS 1-9: 0
1. LITTLE INTEREST OR PLEASURE IN DOING THINGS: NOT AT ALL
SUM OF ALL RESPONSES TO PHQ QUESTIONS 1-9: 0
2. FEELING DOWN, DEPRESSED OR HOPELESS: NOT AT ALL

## 2025-03-18 NOTE — PROGRESS NOTES
Six month check up. Pt states no abnormal spotting, bleeding or pain. Pt states the bloating has improved after taking fiber, bladder control is better, but still has some leaking and c/o pain under her right breast intermittently.   1. Have you been to the ER, urgent care clinic since your last visit?  Hospitalized since your last visit?no  2. Have you seen or consulted any other health care providers outside of the [facility] since your last visit?  Include any pap smears or colon screening. no

## 2025-03-18 NOTE — PROGRESS NOTES
[facility] Distress Thermometer    Replaced by [facility]    Date Screening Completed: 3/18/25    Screening Declined:  [] Yes    Number that best describes how much distress you've experienced in the past week, including today?  0 [] - No distress 1 []      2 []      3 [x]      4 []       5 []       6 []      7 []      8 []      9 []       10 [] - Extreme distress    PROBLEM LIST  Have you had concerns about any of the items below in the past week, including today?      Physical Concerns Practical Concerns   [x] Pain [] Taking care of myself    [x] Sleep [] Taking care of others    [x] Fatigue [] Safety   [] Tobacco use  [] Work   [] Substance use  [] School   [] Memory or concentration [] Housing/Utilities   [] Sexual health [] Finances   [] Changes in eating  [] Insurance   [] Loss or change of physical abilities  [] Transportation    []    Emotional Concerns [] Having enough food   [x] Worry or anxiety [] Access to medicine   [] Sadness or depression [] Treatment decisions   [] Loss of interest or enjoyment     [x] Grief or loss  Spiritual or Temple Concerns   [] Fear [] Sense of meaning or purpose   [] Loneliness  [] Changes in malika or beliefs   [] Anger [x] Death, dying, or afterlife   [] Changes in appearance [] Conflict between beliefs and cancer treatments    [] Feelings of worthlessness or being a burden [] Relationship with the sacred    [] Ritual or dietary needs    Social Concerns     [] Relationship with spouse or partner     [] Relationship with children    [] Relationship with family members     [x] Relationship with friends or coworkers     [] Communication with health care team     [] Ability to have children     [] Prejudice or discrimination        Other Concerns: n/a

## 2025-03-19 ENCOUNTER — TELEPHONE (OUTPATIENT)
Age: 75
End: 2025-03-19

## 2025-03-19 NOTE — TELEPHONE ENCOUNTER
[facility]  Oncology Social Work Encounter    [facility]  Patient: Simona Preciado (1950)    Encounter Type: Distress/PHQ Screening      Concerns/Barriers to Care: Grief and loss    Narrative: LMSW called to follow up on Distress Thermometer and spoke with patient. LMSW introduced SW role and inquired about needs. Patient stated that she feels well supported in her grief at this time, and uses ritual to cope. Patient described a lack of motivation at times, but stated that she has strong support system that she leans on. Patient shared that she lives alone, and used to have animals but they have passed now.  LMSW offered grief counseling referral, but declined at this time. Patient discussed concerns she has related to survivorship. LMSW provided validation and reviewed availability of support groups. Patient declined at this time. No additional needs noted.         Information/Education/Referrals Provided:    Behavioral Health  Support Groups/Peer Support     Plan:   Provide ongoing psychosocial support as desired by the patient.   LMSW remains available for further support and assistance.

## 2025-07-10 ENCOUNTER — OFFICE VISIT (OUTPATIENT)
Age: 75
End: 2025-07-10
Payer: MEDICARE

## 2025-07-10 VITALS
BODY MASS INDEX: 23.07 KG/M2 | SYSTOLIC BLOOD PRESSURE: 147 MMHG | HEIGHT: 67 IN | WEIGHT: 147 LBS | DIASTOLIC BLOOD PRESSURE: 81 MMHG | HEART RATE: 99 BPM

## 2025-07-10 DIAGNOSIS — C54.1 ENDOMETRIAL CANCER (HCC): Primary | ICD-10-CM

## 2025-07-10 PROCEDURE — 1123F ACP DISCUSS/DSCN MKR DOCD: CPT | Performed by: OBSTETRICS & GYNECOLOGY

## 2025-07-10 PROCEDURE — 1090F PRES/ABSN URINE INCON ASSESS: CPT | Performed by: OBSTETRICS & GYNECOLOGY

## 2025-07-10 PROCEDURE — 3079F DIAST BP 80-89 MM HG: CPT | Performed by: OBSTETRICS & GYNECOLOGY

## 2025-07-10 PROCEDURE — 1036F TOBACCO NON-USER: CPT | Performed by: OBSTETRICS & GYNECOLOGY

## 2025-07-10 PROCEDURE — 99213 OFFICE O/P EST LOW 20 MIN: CPT | Performed by: OBSTETRICS & GYNECOLOGY

## 2025-07-10 PROCEDURE — 1126F AMNT PAIN NOTED NONE PRSNT: CPT | Performed by: OBSTETRICS & GYNECOLOGY

## 2025-07-10 PROCEDURE — G8427 DOCREV CUR MEDS BY ELIG CLIN: HCPCS | Performed by: OBSTETRICS & GYNECOLOGY

## 2025-07-10 PROCEDURE — 3077F SYST BP >= 140 MM HG: CPT | Performed by: OBSTETRICS & GYNECOLOGY

## 2025-07-10 PROCEDURE — 1159F MED LIST DOCD IN RCRD: CPT | Performed by: OBSTETRICS & GYNECOLOGY

## 2025-07-10 PROCEDURE — G8420 CALC BMI NORM PARAMETERS: HCPCS | Performed by: OBSTETRICS & GYNECOLOGY

## 2025-07-10 PROCEDURE — G8399 PT W/DXA RESULTS DOCUMENT: HCPCS | Performed by: OBSTETRICS & GYNECOLOGY

## 2025-07-10 PROCEDURE — 1160F RVW MEDS BY RX/DR IN RCRD: CPT | Performed by: OBSTETRICS & GYNECOLOGY

## 2025-07-10 NOTE — PROGRESS NOTES
[facility] GYNECOLOGIC ONCOLOGY  [address] [place] [address], [address]  [place] [address]  P [phone]  F [phone]    Office Note    Patient ID:  Name:  Simona Preciado  MRN:  [id number]  :  1950/75 y.o.  Date:  7/10/2025      HISTORY OF PRESENT ILLNESS:  Simona Preciado is a 75 y.o.  postmenopausal female who is an established patient with a diagnosis of stage IA, grade 3 endometrial cancer.  She underwent a robotic assisted TLH, BSO, SPLND in 2019.  She had minimal invasion, no LVSI, and negative washings.  I did not recommend any adjuvant therapy, though I did discuss her risks of recurrence.  She underwent genetic testing and had a VUS of the SMAD4 gene, while negative for any other mutations.  She did have a second opinion at [facility] with Dr. Marisela Pollack, and ultimately underwent vaginal cuff brachytherapy there, which she completed in 2020.    She presented in 2020 for follow-up.  She stated that she was enrolled in a clinical trial at [facility] on the effects of the use of vaginal dilators.  She was last seen there a few months prior.  She was without complaints.  She denied any vaginal bleeding outside of when she uses the dilator, but that is not every time.  She denied pelvic or abdominal pain.  On pelvic exam I noted a small friable nodular area on the left anterolateral vagina measuring about 1.5 cm.  I performed a biopsy of this suspicious lesion.  The mass was almost removed completely with the biopsy.     FINAL PATHOLOGIC DIAGNOSIS   Left upper vaginal wall, biopsy:   Metastatic adenocarcinoma, compatible with endometrial primary (see comment)   Comment   Immunochemical stains are performed. Tumor cells are positive for ER, TN, and p16 (patchy) while negative for CEA and Napsin A. Given the patient's history, morphologic findings, and immunohistochemical staining pattern the above diagnosis is rendered.     I ordered a CT of the chest/abdomen/pelvis to

## 2025-07-10 NOTE — PROGRESS NOTES
Pt has a history of endometrial cancer. Patient c/o seeing blood when she urinates. Pt is unsure of where it is coming from and it has tapered off today.

## 2025-07-12 LAB
APPEARANCE UR: CLEAR
BACTERIA #/AREA URNS HPF: NORMAL /[HPF]
BILIRUB UR QL STRIP: NEGATIVE
CASTS URNS QL MICRO: NORMAL /LPF
COLOR UR: YELLOW
EPI CELLS #/AREA URNS HPF: NORMAL /HPF (ref 0–10)
GLUCOSE UR QL STRIP: NEGATIVE
HGB UR QL STRIP: NEGATIVE
KETONES UR QL STRIP: NEGATIVE
LEUKOCYTE ESTERASE UR QL STRIP: NEGATIVE
MICRO URNS: ABNORMAL
MICRO URNS: ABNORMAL
NITRITE UR QL STRIP: NEGATIVE
PH UR STRIP: 6.5 [PH] (ref 5–7.5)
PROT UR QL STRIP: NEGATIVE
RBC #/AREA URNS HPF: NORMAL /HPF (ref 0–2)
SP GR UR STRIP: <=1.005 (ref 1–1.03)
UROBILINOGEN UR STRIP-MCNC: 0.2 MG/DL (ref 0.2–1)
WBC #/AREA URNS HPF: NORMAL /HPF (ref 0–5)

## 2025-07-16 ENCOUNTER — TELEPHONE (OUTPATIENT)
Age: 75
End: 2025-07-16

## 2025-07-16 NOTE — TELEPHONE ENCOUNTER
Patient called for her urinalysis results. She said she did not give a good sample.  She continues to see blood in the toilet with urination. She also c/o of swollen ankles. I advised her to see her pcp for the swelling and I will consult with  regarding the blood.

## 2025-07-17 ENCOUNTER — OFFICE VISIT (OUTPATIENT)
Age: 75
End: 2025-07-17
Payer: MEDICARE

## 2025-07-17 VITALS
SYSTOLIC BLOOD PRESSURE: 135 MMHG | DIASTOLIC BLOOD PRESSURE: 72 MMHG | OXYGEN SATURATION: 98 % | WEIGHT: 149 LBS | HEIGHT: 67 IN | TEMPERATURE: 97.1 F | RESPIRATION RATE: 18 BRPM | BODY MASS INDEX: 23.39 KG/M2 | HEART RATE: 108 BPM

## 2025-07-17 DIAGNOSIS — R53.83 FATIGUE, UNSPECIFIED TYPE: ICD-10-CM

## 2025-07-17 DIAGNOSIS — R06.02 SOB (SHORTNESS OF BREATH): ICD-10-CM

## 2025-07-17 DIAGNOSIS — C54.1 ENDOMETRIAL CANCER (HCC): ICD-10-CM

## 2025-07-17 DIAGNOSIS — R60.9 DEPENDENT EDEMA: Primary | ICD-10-CM

## 2025-07-17 DIAGNOSIS — R31.0 GROSS HEMATURIA: ICD-10-CM

## 2025-07-17 PROCEDURE — 1090F PRES/ABSN URINE INCON ASSESS: CPT | Performed by: PHYSICIAN ASSISTANT

## 2025-07-17 PROCEDURE — G8399 PT W/DXA RESULTS DOCUMENT: HCPCS | Performed by: PHYSICIAN ASSISTANT

## 2025-07-17 PROCEDURE — 1123F ACP DISCUSS/DSCN MKR DOCD: CPT | Performed by: PHYSICIAN ASSISTANT

## 2025-07-17 PROCEDURE — 1159F MED LIST DOCD IN RCRD: CPT | Performed by: PHYSICIAN ASSISTANT

## 2025-07-17 PROCEDURE — G8427 DOCREV CUR MEDS BY ELIG CLIN: HCPCS | Performed by: PHYSICIAN ASSISTANT

## 2025-07-17 PROCEDURE — 99214 OFFICE O/P EST MOD 30 MIN: CPT | Performed by: PHYSICIAN ASSISTANT

## 2025-07-17 PROCEDURE — G2211 COMPLEX E/M VISIT ADD ON: HCPCS | Performed by: PHYSICIAN ASSISTANT

## 2025-07-17 PROCEDURE — 1126F AMNT PAIN NOTED NONE PRSNT: CPT | Performed by: PHYSICIAN ASSISTANT

## 2025-07-17 PROCEDURE — 1160F RVW MEDS BY RX/DR IN RCRD: CPT | Performed by: PHYSICIAN ASSISTANT

## 2025-07-17 PROCEDURE — G8420 CALC BMI NORM PARAMETERS: HCPCS | Performed by: PHYSICIAN ASSISTANT

## 2025-07-17 PROCEDURE — 1036F TOBACCO NON-USER: CPT | Performed by: PHYSICIAN ASSISTANT

## 2025-07-17 PROCEDURE — 3075F SYST BP GE 130 - 139MM HG: CPT | Performed by: PHYSICIAN ASSISTANT

## 2025-07-17 PROCEDURE — 3078F DIAST BP <80 MM HG: CPT | Performed by: PHYSICIAN ASSISTANT

## 2025-07-17 RX ORDER — FUROSEMIDE 20 MG/1
20 TABLET ORAL DAILY PRN
Qty: 30 TABLET | Refills: 2 | Status: SHIPPED | OUTPATIENT
Start: 2025-07-17

## 2025-07-17 RX ORDER — ACETAMINOPHEN 325 MG/1
325 TABLET ORAL EVERY 6 HOURS PRN
COMMUNITY

## 2025-07-17 SDOH — ECONOMIC STABILITY: FOOD INSECURITY: WITHIN THE PAST 12 MONTHS, YOU WORRIED THAT YOUR FOOD WOULD RUN OUT BEFORE YOU GOT MONEY TO BUY MORE.: NEVER TRUE

## 2025-07-17 SDOH — ECONOMIC STABILITY: FOOD INSECURITY: WITHIN THE PAST 12 MONTHS, THE FOOD YOU BOUGHT JUST DIDN'T LAST AND YOU DIDN'T HAVE MONEY TO GET MORE.: NEVER TRUE

## 2025-07-17 ASSESSMENT — ENCOUNTER SYMPTOMS
SHORTNESS OF BREATH: 0
COUGH: 0

## 2025-07-17 NOTE — PROGRESS NOTES
I have reviewed all needed documentation in preparation for visit. Verified patient by name and date of birth  Chief Complaint   Patient presents with    Joint Swelling     Hips,legs,ankles are swelling- stared few days ago-        Vitals:    07/17/25 1523   BP: 135/72   BP Site: Left Upper Arm   Patient Position: Sitting   BP Cuff Size: Medium Adult   Pulse: (!) 108   Resp: 18   Temp: 97.1 °F (36.2 °C)   TempSrc: Temporal   SpO2: 98%   Weight: 67.6 kg (149 lb)   Height: 1.702 m (5' 7\")       Health Maintenance Due   Topic Date Due    Pneumococcal 50+ years Vaccine (1 of 1 - PCV) Never done    Shingles vaccine (2 of 3) 12/01/2014    DTaP/Tdap/Td vaccine (2 - Td or Tdap) 10/06/2021    Respiratory Syncytial Virus (RSV) Pregnant or age 60 yrs+ (1 - 1-dose 75+ series) Never done    Annual Wellness Visit (Medicare)  Never done    COVID-19 Vaccine (8 - 2024-25 season) 09/01/2024     \"Have you been to the ER, urgent care clinic since your last visit?  Hospitalized since your last visit?\"    NO    “Have you seen or consulted any other health care providers outside of [facility] since your last visit?”    NO            Click Here for Release of Records Request         Dorina leslie CMA

## 2025-07-17 NOTE — PROGRESS NOTES
Simona Preciado is a 75 y.o. female  Chief Complaint   Patient presents with    Joint Swelling     Hips,legs,ankles are swelling- stared few days ago-      Vitals:    07/17/25 1523   BP: 135/72   BP Site: Left Upper Arm   Patient Position: Sitting   BP Cuff Size: Medium Adult   Pulse: (!) 108   Resp: 18   Temp: 97.1 °F (36.2 °C)   TempSrc: Temporal   SpO2: 98%   Weight: 67.6 kg (149 lb)   Height: 1.702 m (5' 7\")      Wt Readings from Last 3 Encounters:   07/10/25 66.7 kg (147 lb)   03/18/25 67.1 kg (148 lb)   02/13/24 70.8 kg (156 lb)     BMI Readings from Last 3 Encounters:   07/10/25 23.02 kg/m²   03/18/25 23.17 kg/m²   09/17/24 24.43 kg/m²     Health Maintenance Due   Topic Date Due    Pneumococcal 50+ years Vaccine (1 of 1 - PCV) Never done    Shingles vaccine (2 of 3) 12/01/2014    DTaP/Tdap/Td vaccine (2 - Td or Tdap) 10/06/2021    Respiratory Syncytial Virus (RSV) Pregnant or age 60 yrs+ (1 - 1-dose 75+ series) Never done    Annual Wellness Visit (Medicare)  Never done    COVID-19 Vaccine (8 - 2024-25 season) 09/01/2024     HPI  Nathanael Hematuria. Consulted with Gyn Onc recently and recommendation to repeat UA was given. Gyn/Onc suspects this is related to vaginal scar tissue. Pt hasn't seen Urology about this and is unsure if she wants to do so. Pt did see nathanael blood in urine again today.     Ankle swelling x several days. No change in diet. Avoiding salt regularly. No hx cardiac problems. Mild fatigue & questions possible mild SOB but no unusual BHATT/chest pain.     CV follow up  BP controlled today. Pt reports that BPs at home have been normal consistently.:  BP Readings from Last 3 Encounters:   07/17/25 135/72   07/10/25 (!) 147/81   03/18/25 (!) 155/89     Currently taking no HTN meds.       Creatinine (mg/dL)   Date Value   06/22/2022 0.83   Creatinine on outside records ([facility]) normal at last check 3/2024 0.9     ROS  Review of Systems   Constitutional:  Negative for fever.   Respiratory:  Negative

## 2025-07-18 LAB
ANION GAP SERPL CALC-SCNC: 6 MMOL/L (ref 2–12)
APPEARANCE UR: CLEAR
BACTERIA URNS QL MICRO: ABNORMAL /HPF
BASOPHILS # BLD: 0.05 K/UL (ref 0–0.1)
BASOPHILS NFR BLD: 0.4 % (ref 0–1)
BILIRUB UR QL CFM: POSITIVE
BUN SERPL-MCNC: 20 MG/DL (ref 6–20)
BUN/CREAT SERPL: 19 (ref 12–20)
CALCIUM SERPL-MCNC: 10.3 MG/DL (ref 8.5–10.1)
CHLORIDE SERPL-SCNC: 107 MMOL/L (ref 97–108)
CO2 SERPL-SCNC: 25 MMOL/L (ref 21–32)
COLOR UR: ABNORMAL
CREAT SERPL-MCNC: 1.06 MG/DL (ref 0.55–1.02)
DIFFERENTIAL METHOD BLD: ABNORMAL
EOSINOPHIL # BLD: 0.12 K/UL (ref 0–0.4)
EOSINOPHIL NFR BLD: 1 % (ref 0–7)
EPITH CASTS URNS QL MICRO: ABNORMAL /LPF
ERYTHROCYTE [DISTWIDTH] IN BLOOD BY AUTOMATED COUNT: 22.5 % (ref 11.5–14.5)
GLUCOSE SERPL-MCNC: 171 MG/DL (ref 65–100)
GLUCOSE UR STRIP.AUTO-MCNC: NEGATIVE MG/DL
HCT VFR BLD AUTO: 32.5 % (ref 35–47)
HGB BLD-MCNC: 10.7 G/DL (ref 11.5–16)
HGB UR QL STRIP: NEGATIVE
IMM GRANULOCYTES # BLD AUTO: 0.05 K/UL (ref 0–0.04)
IMM GRANULOCYTES NFR BLD AUTO: 0.4 % (ref 0–0.5)
KETONES UR QL STRIP.AUTO: ABNORMAL MG/DL
LEUKOCYTE ESTERASE UR QL STRIP.AUTO: ABNORMAL
LYMPHOCYTES # BLD: 0.46 K/UL (ref 0.8–3.5)
LYMPHOCYTES NFR BLD: 3.7 % (ref 12–49)
MCH RBC QN AUTO: 30.9 PG (ref 26–34)
MCHC RBC AUTO-ENTMCNC: 32.9 G/DL (ref 30–36.5)
MCV RBC AUTO: 93.9 FL (ref 80–99)
MONOCYTES # BLD: 0.85 K/UL (ref 0–1)
MONOCYTES NFR BLD: 6.9 % (ref 5–13)
NEUTS SEG # BLD: 10.77 K/UL (ref 1.8–8)
NEUTS SEG NFR BLD: 87.6 % (ref 32–75)
NITRITE UR QL STRIP.AUTO: POSITIVE
NRBC # BLD: 0 K/UL (ref 0–0.01)
NRBC BLD-RTO: 0 PER 100 WBC
NT PRO BNP: 516 PG/ML
PH UR STRIP: 6 (ref 5–8)
PLATELET # BLD AUTO: 446 K/UL (ref 150–400)
PMV BLD AUTO: 10.8 FL (ref 8.9–12.9)
POTASSIUM SERPL-SCNC: 3.4 MMOL/L (ref 3.5–5.1)
PROT UR STRIP-MCNC: 30 MG/DL
RBC # BLD AUTO: 3.46 M/UL (ref 3.8–5.2)
RBC #/AREA URNS HPF: ABNORMAL /HPF (ref 0–5)
RBC MORPH BLD: ABNORMAL
SODIUM SERPL-SCNC: 138 MMOL/L (ref 136–145)
SP GR UR REFRACTOMETRY: 1.03 (ref 1–1.03)
URINE CULTURE IF INDICATED: ABNORMAL
UROBILINOGEN UR QL STRIP.AUTO: 1 EU/DL (ref 0.2–1)
WBC # BLD AUTO: 12.3 K/UL (ref 3.6–11)
WBC MORPH BLD: ABNORMAL
WBC URNS QL MICRO: ABNORMAL /HPF (ref 0–4)

## 2025-07-21 ENCOUNTER — RESULTS FOLLOW-UP (OUTPATIENT)
Age: 75
End: 2025-07-21

## 2025-07-21 RX ORDER — SULFAMETHOXAZOLE AND TRIMETHOPRIM 800; 160 MG/1; MG/1
1 TABLET ORAL 2 TIMES DAILY
Qty: 14 TABLET | Refills: 0 | Status: SHIPPED | OUTPATIENT
Start: 2025-07-21 | End: 2025-07-28

## 2025-07-24 ENCOUNTER — OFFICE VISIT (OUTPATIENT)
Age: 75
End: 2025-07-24
Payer: MEDICARE

## 2025-07-24 VITALS
TEMPERATURE: 97.7 F | WEIGHT: 153.2 LBS | OXYGEN SATURATION: 97 % | RESPIRATION RATE: 18 BRPM | DIASTOLIC BLOOD PRESSURE: 65 MMHG | HEART RATE: 77 BPM | HEIGHT: 67 IN | BODY MASS INDEX: 24.04 KG/M2 | SYSTOLIC BLOOD PRESSURE: 121 MMHG

## 2025-07-24 DIAGNOSIS — R79.89 ELEVATED BRAIN NATRIURETIC PEPTIDE (BNP) LEVEL: ICD-10-CM

## 2025-07-24 DIAGNOSIS — R79.89 ABNORMAL PLATELET COUNT: ICD-10-CM

## 2025-07-24 DIAGNOSIS — D64.9 NORMOCYTIC ANEMIA: ICD-10-CM

## 2025-07-24 DIAGNOSIS — E83.52 SERUM CALCIUM ELEVATED: ICD-10-CM

## 2025-07-24 DIAGNOSIS — R06.02 SOB (SHORTNESS OF BREATH): Primary | ICD-10-CM

## 2025-07-24 DIAGNOSIS — R60.9 DEPENDENT EDEMA: ICD-10-CM

## 2025-07-24 PROCEDURE — 99214 OFFICE O/P EST MOD 30 MIN: CPT | Performed by: PHYSICIAN ASSISTANT

## 2025-07-24 PROCEDURE — 1160F RVW MEDS BY RX/DR IN RCRD: CPT | Performed by: PHYSICIAN ASSISTANT

## 2025-07-24 PROCEDURE — G8399 PT W/DXA RESULTS DOCUMENT: HCPCS | Performed by: PHYSICIAN ASSISTANT

## 2025-07-24 PROCEDURE — G2211 COMPLEX E/M VISIT ADD ON: HCPCS | Performed by: PHYSICIAN ASSISTANT

## 2025-07-24 PROCEDURE — 1036F TOBACCO NON-USER: CPT | Performed by: PHYSICIAN ASSISTANT

## 2025-07-24 PROCEDURE — 1090F PRES/ABSN URINE INCON ASSESS: CPT | Performed by: PHYSICIAN ASSISTANT

## 2025-07-24 PROCEDURE — 3078F DIAST BP <80 MM HG: CPT | Performed by: PHYSICIAN ASSISTANT

## 2025-07-24 PROCEDURE — 1159F MED LIST DOCD IN RCRD: CPT | Performed by: PHYSICIAN ASSISTANT

## 2025-07-24 PROCEDURE — 3074F SYST BP LT 130 MM HG: CPT | Performed by: PHYSICIAN ASSISTANT

## 2025-07-24 PROCEDURE — G8420 CALC BMI NORM PARAMETERS: HCPCS | Performed by: PHYSICIAN ASSISTANT

## 2025-07-24 PROCEDURE — 1123F ACP DISCUSS/DSCN MKR DOCD: CPT | Performed by: PHYSICIAN ASSISTANT

## 2025-07-24 PROCEDURE — G8427 DOCREV CUR MEDS BY ELIG CLIN: HCPCS | Performed by: PHYSICIAN ASSISTANT

## 2025-07-24 RX ORDER — FUROSEMIDE 20 MG/1
20 TABLET ORAL 2 TIMES DAILY
Qty: 60 TABLET | Refills: 2 | Status: SHIPPED | OUTPATIENT
Start: 2025-07-24

## 2025-07-24 ASSESSMENT — ENCOUNTER SYMPTOMS
SHORTNESS OF BREATH: 0
COUGH: 0

## 2025-07-24 NOTE — PROGRESS NOTES
I have reviewed all needed documentation in preparation for visit. Verified patient by name and date of birth  Chief Complaint   Patient presents with    Follow-up    Leg Swelling    Discuss Labs       Vitals:    07/24/25 1111   BP: 121/65   BP Site: Left Upper Arm   Patient Position: Sitting   BP Cuff Size: Medium Adult   Pulse: 77   Resp: 18   Temp: 97.7 °F (36.5 °C)   TempSrc: Temporal   SpO2: 97%   Weight: 69.5 kg (153 lb 3.2 oz)   Height: 1.702 m (5' 7\")       Health Maintenance Due   Topic Date Due    Pneumococcal 50+ years Vaccine (1 of 1 - PCV) Never done    Shingles vaccine (2 of 3) 12/01/2014    DTaP/Tdap/Td vaccine (2 - Td or Tdap) 10/06/2021    Respiratory Syncytial Virus (RSV) Pregnant or age 60 yrs+ (1 - 1-dose 75+ series) Never done    Annual Wellness Visit (Medicare)  Never done    COVID-19 Vaccine (8 - 2024-25 season) 09/01/2024     \"Have you been to the ER, urgent care clinic since your last visit?  Hospitalized since your last visit?\"    NO    “Have you seen or consulted any other health care providers outside of [facility] since your last visit?”    NO            Click Here for Release of Records Request         YANIV Larson

## 2025-07-24 NOTE — PROGRESS NOTES
Simona Preciado is a 75 y.o. female  Chief Complaint   Patient presents with    Follow-up    Leg Swelling    Discuss Labs     Vitals:    07/24/25 1111   BP: 121/65   BP Site: Left Upper Arm   Patient Position: Sitting   BP Cuff Size: Medium Adult   Pulse: 77   Resp: 18   Temp: 97.7 °F (36.5 °C)   TempSrc: Temporal   SpO2: 97%   Weight: 69.5 kg (153 lb 3.2 oz)   Height: 1.702 m (5' 7\")      Wt Readings from Last 3 Encounters:   07/24/25 69.5 kg (153 lb 3.2 oz)   07/17/25 67.6 kg (149 lb)   07/10/25 66.7 kg (147 lb)     BMI Readings from Last 3 Encounters:   07/24/25 23.99 kg/m²   07/17/25 23.34 kg/m²   07/10/25 23.02 kg/m²     Health Maintenance Due   Topic Date Due    Pneumococcal 50+ years Vaccine (1 of 1 - PCV) Never done    Shingles vaccine (2 of 3) 12/01/2014    DTaP/Tdap/Td vaccine (2 - Td or Tdap) 10/06/2021    Respiratory Syncytial Virus (RSV) Pregnant or age 60 yrs+ (1 - 1-dose 75+ series) Never done    Annual Wellness Visit (Medicare)  Never done    COVID-19 Vaccine (8 - 2024-25 season) 09/01/2024     HPI  Lab follow up:     UTI (taking antibiotic now) dx last week. Feeling better now.      Slightly Elevated serum calcium, mild anemia, elevated platelets - possibly reactive from UTI vs Endometrial Cancer.    Mild SOB and significant ankle swelling, fatigue only slightly better with Lasix 20 mg daily. BNP high. Has never seen Cardiology.      ROS  Review of Systems   Constitutional:  Positive for fatigue. Negative for fever.   Respiratory:  Negative for cough and shortness of breath.    Cardiovascular:  Negative for chest pain and palpitations.   Neurological:  Negative for headaches.   Psychiatric/Behavioral:  Negative for dysphoric mood.      EXAM  Physical Exam  Vitals and nursing note reviewed.   Constitutional:       Appearance: Normal appearance.   HENT:      Head: Normocephalic and atraumatic.   Neck:      Vascular: No carotid bruit.   Cardiovascular:      Rate and Rhythm: Normal rate and regular

## 2025-07-31 ENCOUNTER — HOSPITAL ENCOUNTER (INPATIENT)
Facility: HOSPITAL | Age: 75
LOS: 7 days | DRG: 435 | End: 2025-08-07
Attending: EMERGENCY MEDICINE | Admitting: FAMILY MEDICINE
Payer: MEDICARE

## 2025-07-31 ENCOUNTER — APPOINTMENT (OUTPATIENT)
Facility: HOSPITAL | Age: 75
DRG: 435 | End: 2025-07-31
Payer: MEDICARE

## 2025-07-31 DIAGNOSIS — R17 JAUNDICE: ICD-10-CM

## 2025-07-31 DIAGNOSIS — N17.9 ACUTE KIDNEY INJURY: ICD-10-CM

## 2025-07-31 DIAGNOSIS — C78.7 METASTASES TO THE LIVER (HCC): Primary | ICD-10-CM

## 2025-07-31 DIAGNOSIS — R60.9 DEPENDENT EDEMA: ICD-10-CM

## 2025-07-31 DIAGNOSIS — K72.00 ACUTE LIVER FAILURE WITHOUT HEPATIC COMA: ICD-10-CM

## 2025-07-31 DIAGNOSIS — R53.83 FATIGUE, UNSPECIFIED TYPE: ICD-10-CM

## 2025-07-31 PROBLEM — R74.01 TRANSAMINITIS: Status: ACTIVE | Noted: 2025-07-31

## 2025-07-31 LAB
ALBUMIN SERPL-MCNC: 2.1 G/DL (ref 3.5–5.2)
ALBUMIN/GLOB SERPL: 0.6 (ref 1.1–2.2)
ALP SERPL-CCNC: 1904 U/L (ref 35–104)
ALT SERPL-CCNC: 191 U/L (ref 10–35)
ANION GAP SERPL CALC-SCNC: 15 MMOL/L (ref 2–14)
AST SERPL-CCNC: 324 U/L (ref 10–35)
BASOPHILS # BLD: 0.02 K/UL (ref 0–0.1)
BASOPHILS NFR BLD: 0.1 % (ref 0–1)
BILIRUB SERPL-MCNC: 23.1 MG/DL (ref 0–1.2)
BUN SERPL-MCNC: 33 MG/DL (ref 8–23)
BUN/CREAT SERPL: 17 (ref 12–20)
CALCIUM SERPL-MCNC: 10.9 MG/DL (ref 8.8–10.2)
CANCER AG125 SERPL-ACNC: 533 U/ML (ref 0–38)
CEA SERPL-MCNC: 9.3 NG/ML (ref 0–5.5)
CHLORIDE SERPL-SCNC: 84 MMOL/L (ref 98–107)
CO2 SERPL-SCNC: 22 MMOL/L (ref 20–29)
COMMENT:: NORMAL
COMMENT:: NORMAL
CREAT SERPL-MCNC: 1.94 MG/DL (ref 0.6–1)
DIFFERENTIAL METHOD BLD: ABNORMAL
EKG ATRIAL RATE: 80 BPM
EKG DIAGNOSIS: NORMAL
EKG P AXIS: 39 DEGREES
EKG P-R INTERVAL: 172 MS
EKG Q-T INTERVAL: 364 MS
EKG QRS DURATION: 92 MS
EKG QTC CALCULATION (BAZETT): 419 MS
EKG R AXIS: -6 DEGREES
EKG T AXIS: 42 DEGREES
EKG VENTRICULAR RATE: 80 BPM
EOSINOPHIL # BLD: 0.08 K/UL (ref 0–0.4)
EOSINOPHIL NFR BLD: 0.4 % (ref 0–7)
ERYTHROCYTE [DISTWIDTH] IN BLOOD BY AUTOMATED COUNT: 22.1 % (ref 11.5–14.5)
GLOBULIN SER CALC-MCNC: 3.4 G/DL (ref 2–4)
GLUCOSE SERPL-MCNC: 91 MG/DL (ref 65–100)
HCT VFR BLD AUTO: 30.7 % (ref 35–47)
HGB BLD-MCNC: 11.1 G/DL (ref 11.5–16)
IMM GRANULOCYTES # BLD AUTO: 0.19 K/UL (ref 0–0.04)
IMM GRANULOCYTES NFR BLD AUTO: 0.9 % (ref 0–0.5)
INR PPP: 1.7 (ref 0.9–1.1)
LYMPHOCYTES # BLD: 0.37 K/UL (ref 0.8–3.5)
LYMPHOCYTES NFR BLD: 1.8 % (ref 12–49)
MAGNESIUM SERPL-MCNC: 2.2 MG/DL (ref 1.6–2.4)
MCH RBC QN AUTO: 31.1 PG (ref 26–34)
MCHC RBC AUTO-ENTMCNC: 36.2 G/DL (ref 30–36.5)
MCV RBC AUTO: 86 FL (ref 80–99)
MONOCYTES # BLD: 0.7 K/UL (ref 0–1)
MONOCYTES NFR BLD: 3.4 % (ref 5–13)
NEUTS SEG # BLD: 19.33 K/UL (ref 1.8–8)
NEUTS SEG NFR BLD: 93.4 % (ref 32–75)
NRBC # BLD: 0 K/UL (ref 0–0.01)
NRBC BLD-RTO: 0 PER 100 WBC
NT PRO BNP: 774 PG/ML (ref 0–450)
PLATELET # BLD AUTO: 430 K/UL (ref 150–400)
PMV BLD AUTO: 9.6 FL (ref 8.9–12.9)
POTASSIUM SERPL-SCNC: 2.9 MMOL/L (ref 3.5–5.1)
PROT SERPL-MCNC: 5.5 G/DL (ref 6.4–8.3)
PROTHROMBIN TIME: 17.4 SEC (ref 9.2–11.2)
RBC # BLD AUTO: 3.57 M/UL (ref 3.8–5.2)
RBC MORPH BLD: ABNORMAL
RBC MORPH BLD: ABNORMAL
SODIUM SERPL-SCNC: 120 MMOL/L (ref 136–145)
SPECIMEN HOLD: NORMAL
SPECIMEN HOLD: NORMAL
TROPONIN T SERPL HS-MCNC: 23.4 NG/L (ref 0–14)
WBC # BLD AUTO: 20.7 K/UL (ref 3.6–11)

## 2025-07-31 PROCEDURE — 85025 COMPLETE CBC W/AUTO DIFF WBC: CPT

## 2025-07-31 PROCEDURE — 2580000003 HC RX 258: Performed by: NURSE PRACTITIONER

## 2025-07-31 PROCEDURE — 1200000000 HC SEMI PRIVATE

## 2025-07-31 PROCEDURE — 6370000000 HC RX 637 (ALT 250 FOR IP): Performed by: NURSE PRACTITIONER

## 2025-07-31 PROCEDURE — 6360000004 HC RX CONTRAST MEDICATION: Performed by: RADIOLOGY

## 2025-07-31 PROCEDURE — 86304 IMMUNOASSAY TUMOR CA 125: CPT

## 2025-07-31 PROCEDURE — 85610 PROTHROMBIN TIME: CPT

## 2025-07-31 PROCEDURE — 99285 EMERGENCY DEPT VISIT HI MDM: CPT

## 2025-07-31 PROCEDURE — 96360 HYDRATION IV INFUSION INIT: CPT

## 2025-07-31 PROCEDURE — 71275 CT ANGIOGRAPHY CHEST: CPT

## 2025-07-31 PROCEDURE — 74177 CT ABD & PELVIS W/CONTRAST: CPT

## 2025-07-31 PROCEDURE — 84484 ASSAY OF TROPONIN QUANT: CPT

## 2025-07-31 PROCEDURE — 82378 CARCINOEMBRYONIC ANTIGEN: CPT

## 2025-07-31 PROCEDURE — 83880 ASSAY OF NATRIURETIC PEPTIDE: CPT

## 2025-07-31 PROCEDURE — 2580000003 HC RX 258: Performed by: EMERGENCY MEDICINE

## 2025-07-31 PROCEDURE — 6360000002 HC RX W HCPCS: Performed by: NURSE PRACTITIONER

## 2025-07-31 PROCEDURE — 83735 ASSAY OF MAGNESIUM: CPT

## 2025-07-31 PROCEDURE — 80053 COMPREHEN METABOLIC PANEL: CPT

## 2025-07-31 PROCEDURE — 93005 ELECTROCARDIOGRAM TRACING: CPT | Performed by: EMERGENCY MEDICINE

## 2025-07-31 RX ORDER — TIMOLOL MALEATE 5 MG/ML
1 SOLUTION/ DROPS OPHTHALMIC 2 TIMES DAILY
Status: DISCONTINUED | OUTPATIENT
Start: 2025-07-31 | End: 2025-08-08 | Stop reason: HOSPADM

## 2025-07-31 RX ORDER — ENOXAPARIN SODIUM 100 MG/ML
30 INJECTION SUBCUTANEOUS EVERY EVENING
Status: DISCONTINUED | OUTPATIENT
Start: 2025-07-31 | End: 2025-08-08 | Stop reason: HOSPADM

## 2025-07-31 RX ORDER — CALCIUM CARBONATE 500 MG/1
500 TABLET, CHEWABLE ORAL EVERY 4 HOURS PRN
Status: DISCONTINUED | OUTPATIENT
Start: 2025-07-31 | End: 2025-08-07

## 2025-07-31 RX ORDER — SODIUM CHLORIDE 0.9 % (FLUSH) 0.9 %
5-40 SYRINGE (ML) INJECTION EVERY 12 HOURS SCHEDULED
Status: DISCONTINUED | OUTPATIENT
Start: 2025-07-31 | End: 2025-08-07

## 2025-07-31 RX ORDER — ONDANSETRON 4 MG/1
4 TABLET, ORALLY DISINTEGRATING ORAL EVERY 8 HOURS PRN
Status: DISCONTINUED | OUTPATIENT
Start: 2025-07-31 | End: 2025-08-08 | Stop reason: HOSPADM

## 2025-07-31 RX ORDER — SODIUM CHLORIDE 0.9 % (FLUSH) 0.9 %
5-40 SYRINGE (ML) INJECTION PRN
Status: DISCONTINUED | OUTPATIENT
Start: 2025-07-31 | End: 2025-08-07

## 2025-07-31 RX ORDER — DORZOLAMIDE HYDROCHLORIDE AND TIMOLOL MALEATE 20; 5 MG/ML; MG/ML
1 SOLUTION/ DROPS OPHTHALMIC 2 TIMES DAILY
Status: DISCONTINUED | OUTPATIENT
Start: 2025-07-31 | End: 2025-07-31 | Stop reason: SDUPTHER

## 2025-07-31 RX ORDER — 0.9 % SODIUM CHLORIDE 0.9 %
1000 INTRAVENOUS SOLUTION INTRAVENOUS ONCE
Status: COMPLETED | OUTPATIENT
Start: 2025-07-31 | End: 2025-07-31

## 2025-07-31 RX ORDER — ONDANSETRON 2 MG/ML
4 INJECTION INTRAMUSCULAR; INTRAVENOUS EVERY 6 HOURS PRN
Status: DISCONTINUED | OUTPATIENT
Start: 2025-07-31 | End: 2025-08-08 | Stop reason: HOSPADM

## 2025-07-31 RX ORDER — SODIUM CHLORIDE 9 MG/ML
INJECTION, SOLUTION INTRAVENOUS PRN
Status: DISCONTINUED | OUTPATIENT
Start: 2025-07-31 | End: 2025-08-07

## 2025-07-31 RX ORDER — ACETAMINOPHEN 650 MG/1
650 SUPPOSITORY RECTAL EVERY 6 HOURS PRN
Status: DISCONTINUED | OUTPATIENT
Start: 2025-07-31 | End: 2025-08-08 | Stop reason: HOSPADM

## 2025-07-31 RX ORDER — LATANOPROST 50 UG/ML
1 SOLUTION/ DROPS OPHTHALMIC NIGHTLY
Status: DISCONTINUED | OUTPATIENT
Start: 2025-07-31 | End: 2025-08-08 | Stop reason: HOSPADM

## 2025-07-31 RX ORDER — DORZOLAMIDE HCL 20 MG/ML
1 SOLUTION/ DROPS OPHTHALMIC 2 TIMES DAILY
Status: DISCONTINUED | OUTPATIENT
Start: 2025-07-31 | End: 2025-08-08 | Stop reason: HOSPADM

## 2025-07-31 RX ORDER — ACETAMINOPHEN 325 MG/1
650 TABLET ORAL EVERY 6 HOURS PRN
Status: DISCONTINUED | OUTPATIENT
Start: 2025-07-31 | End: 2025-08-08 | Stop reason: HOSPADM

## 2025-07-31 RX ORDER — POLYETHYLENE GLYCOL 3350 17 G/17G
17 POWDER, FOR SOLUTION ORAL DAILY PRN
Status: DISCONTINUED | OUTPATIENT
Start: 2025-07-31 | End: 2025-08-05

## 2025-07-31 RX ORDER — IOPAMIDOL 755 MG/ML
100 INJECTION, SOLUTION INTRAVASCULAR
Status: COMPLETED | OUTPATIENT
Start: 2025-07-31 | End: 2025-07-31

## 2025-07-31 RX ORDER — POTASSIUM CHLORIDE 7.45 MG/ML
10 INJECTION INTRAVENOUS
Status: COMPLETED | OUTPATIENT
Start: 2025-07-31 | End: 2025-07-31

## 2025-07-31 RX ORDER — SODIUM CHLORIDE 9 MG/ML
INJECTION, SOLUTION INTRAVENOUS CONTINUOUS
Status: DISCONTINUED | OUTPATIENT
Start: 2025-07-31 | End: 2025-08-01

## 2025-07-31 RX ADMIN — SODIUM CHLORIDE 1000 ML: 0.9 INJECTION, SOLUTION INTRAVENOUS at 10:56

## 2025-07-31 RX ADMIN — CALCIUM CARBONATE (ANTACID) CHEW TAB 500 MG 500 MG: 500 CHEW TAB at 21:06

## 2025-07-31 RX ADMIN — LATANOPROST 1 DROP: 50 SOLUTION OPHTHALMIC at 22:07

## 2025-07-31 RX ADMIN — IOPAMIDOL 100 ML: 755 INJECTION, SOLUTION INTRAVENOUS at 14:02

## 2025-07-31 RX ADMIN — POTASSIUM CHLORIDE 10 MEQ: 7.46 INJECTION, SOLUTION INTRAVENOUS at 20:20

## 2025-07-31 RX ADMIN — ENOXAPARIN SODIUM 30 MG: 100 INJECTION SUBCUTANEOUS at 17:59

## 2025-07-31 RX ADMIN — SODIUM CHLORIDE: 0.9 INJECTION, SOLUTION INTRAVENOUS at 16:53

## 2025-07-31 RX ADMIN — POTASSIUM CHLORIDE 10 MEQ: 7.46 INJECTION, SOLUTION INTRAVENOUS at 18:19

## 2025-07-31 RX ADMIN — POTASSIUM CHLORIDE 10 MEQ: 7.46 INJECTION, SOLUTION INTRAVENOUS at 16:21

## 2025-07-31 RX ADMIN — POTASSIUM CHLORIDE 10 MEQ: 7.46 INJECTION, SOLUTION INTRAVENOUS at 17:17

## 2025-07-31 RX ADMIN — POTASSIUM CHLORIDE 10 MEQ: 7.46 INJECTION, SOLUTION INTRAVENOUS at 19:19

## 2025-07-31 ASSESSMENT — LIFESTYLE VARIABLES
HOW MANY STANDARD DRINKS CONTAINING ALCOHOL DO YOU HAVE ON A TYPICAL DAY: PATIENT DOES NOT DRINK
HOW OFTEN DO YOU HAVE A DRINK CONTAINING ALCOHOL: NEVER

## 2025-07-31 ASSESSMENT — PAIN DESCRIPTION - FREQUENCY: FREQUENCY: CONTINUOUS

## 2025-07-31 ASSESSMENT — PAIN SCALES - GENERAL
PAINLEVEL_OUTOF10: 0
PAINLEVEL_OUTOF10: 3

## 2025-07-31 ASSESSMENT — PAIN - FUNCTIONAL ASSESSMENT: PAIN_FUNCTIONAL_ASSESSMENT: ACTIVITIES ARE NOT PREVENTED

## 2025-07-31 ASSESSMENT — PAIN DESCRIPTION - DESCRIPTORS: DESCRIPTORS: ACHING

## 2025-07-31 ASSESSMENT — PAIN DESCRIPTION - LOCATION: LOCATION: BACK

## 2025-07-31 ASSESSMENT — PAIN DESCRIPTION - ORIENTATION: ORIENTATION: LOWER

## 2025-07-31 ASSESSMENT — PAIN DESCRIPTION - ONSET: ONSET: ON-GOING

## 2025-07-31 ASSESSMENT — PAIN DESCRIPTION - PAIN TYPE: TYPE: CHRONIC PAIN

## 2025-08-01 PROBLEM — K72.00 ACUTE LIVER FAILURE WITHOUT HEPATIC COMA: Status: ACTIVE | Noted: 2025-08-01

## 2025-08-01 PROBLEM — N17.9 ACUTE KIDNEY INJURY: Status: ACTIVE | Noted: 2025-08-01

## 2025-08-01 PROBLEM — R17 JAUNDICE: Status: ACTIVE | Noted: 2025-08-01

## 2025-08-01 PROBLEM — C78.7 METASTASES TO THE LIVER (HCC): Status: ACTIVE | Noted: 2025-08-01

## 2025-08-01 LAB
ALBUMIN SERPL-MCNC: 1.9 G/DL (ref 3.5–5.2)
ALBUMIN/GLOB SERPL: 0.7 (ref 1.1–2.2)
ALP SERPL-CCNC: 1846 U/L (ref 35–104)
ALT SERPL-CCNC: 179 U/L (ref 10–50)
ANION GAP SERPL CALC-SCNC: 15 MMOL/L (ref 2–14)
ANION GAP SERPL CALC-SCNC: 16 MMOL/L (ref 2–14)
APPEARANCE UR: ABNORMAL
AST SERPL-CCNC: 302 U/L (ref 10–35)
BACTERIA URNS QL MICRO: ABNORMAL /HPF
BILIRUB SERPL-MCNC: 21.9 MG/DL (ref 0–1.2)
BILIRUB UR QL CFM: POSITIVE
BUN SERPL-MCNC: 37 MG/DL (ref 8–23)
BUN SERPL-MCNC: 41 MG/DL (ref 8–23)
BUN/CREAT SERPL: 20 (ref 12–20)
CALCIUM SERPL-MCNC: 10 MG/DL (ref 8.8–10.2)
CALCIUM SERPL-MCNC: 10 MG/DL (ref 8.8–10.2)
CHLORIDE SERPL-SCNC: 86 MMOL/L (ref 98–107)
CHLORIDE SERPL-SCNC: 86 MMOL/L (ref 98–107)
CO2 SERPL-SCNC: 18 MMOL/L (ref 20–29)
CO2 SERPL-SCNC: 18 MMOL/L (ref 20–29)
COLOR UR: ABNORMAL
CREAT SERPL-MCNC: 1.9 MG/DL (ref 0.6–1)
CREAT SERPL-MCNC: 2.09 MG/DL (ref 0.6–1)
CREAT UR-MCNC: 151 MG/DL (ref 28–217)
EPITH CASTS URNS QL MICRO: ABNORMAL /LPF
ERYTHROCYTE [DISTWIDTH] IN BLOOD BY AUTOMATED COUNT: 23.2 % (ref 11.5–14.5)
GLOBULIN SER CALC-MCNC: 2.8 G/DL (ref 2–4)
GLUCOSE SERPL-MCNC: 112 MG/DL (ref 65–100)
GLUCOSE SERPL-MCNC: 71 MG/DL (ref 65–100)
GLUCOSE UR STRIP.AUTO-MCNC: NEGATIVE MG/DL
HCT VFR BLD AUTO: 31 % (ref 35–47)
HGB BLD-MCNC: 11 G/DL (ref 11.5–16)
HGB UR QL STRIP: NEGATIVE
KETONES UR QL STRIP.AUTO: NEGATIVE MG/DL
LEUKOCYTE ESTERASE UR QL STRIP.AUTO: ABNORMAL
MAGNESIUM SERPL-MCNC: 2.2 MG/DL (ref 1.6–2.4)
MCH RBC QN AUTO: 31.4 PG (ref 26–34)
MCHC RBC AUTO-ENTMCNC: 35.5 G/DL (ref 30–36.5)
MCV RBC AUTO: 88.6 FL (ref 80–99)
NITRITE UR QL STRIP.AUTO: POSITIVE
NRBC # BLD: 0 K/UL (ref 0–0.01)
NRBC BLD-RTO: 0 PER 100 WBC
OSMOLALITY UR: 391 MOSM/KG H2O
PH UR STRIP: 5.5 (ref 5–8)
PHOSPHATE SERPL-MCNC: 3 MG/DL (ref 2.4–4.5)
PLATELET # BLD AUTO: 350 K/UL (ref 150–400)
PMV BLD AUTO: 10.2 FL (ref 8.9–12.9)
POTASSIUM SERPL-SCNC: 3.3 MMOL/L (ref 3.5–5.1)
POTASSIUM SERPL-SCNC: 3.7 MMOL/L (ref 3.5–5.1)
PROT SERPL-MCNC: 4.7 G/DL (ref 6.4–8.3)
PROT UR STRIP-MCNC: ABNORMAL MG/DL
RBC # BLD AUTO: 3.5 M/UL (ref 3.8–5.2)
RBC #/AREA URNS HPF: ABNORMAL /HPF (ref 0–5)
SODIUM SERPL-SCNC: 120 MMOL/L (ref 136–145)
SODIUM SERPL-SCNC: 121 MMOL/L (ref 136–145)
SODIUM UR-SCNC: <20 MMOL/L
SP GR UR REFRACTOMETRY: 1.02 (ref 1–1.03)
SPECIMEN HOLD: NORMAL
T4 FREE SERPL-MCNC: 1 NG/DL (ref 0.9–1.6)
TSH, 3RD GENERATION: 0.67 UIU/ML (ref 0.27–4.2)
UROBILINOGEN UR QL STRIP.AUTO: 0.2 EU/DL (ref 0.2–1)
WBC # BLD AUTO: 21 K/UL (ref 3.6–11)
WBC URNS QL MICRO: ABNORMAL /HPF (ref 0–4)

## 2025-08-01 PROCEDURE — 97530 THERAPEUTIC ACTIVITIES: CPT

## 2025-08-01 PROCEDURE — P9047 ALBUMIN (HUMAN), 25%, 50ML: HCPCS | Performed by: INTERNAL MEDICINE

## 2025-08-01 PROCEDURE — 83735 ASSAY OF MAGNESIUM: CPT

## 2025-08-01 PROCEDURE — 83935 ASSAY OF URINE OSMOLALITY: CPT

## 2025-08-01 PROCEDURE — 6360000002 HC RX W HCPCS: Performed by: INTERNAL MEDICINE

## 2025-08-01 PROCEDURE — 99222 1ST HOSP IP/OBS MODERATE 55: CPT | Performed by: OBSTETRICS & GYNECOLOGY

## 2025-08-01 PROCEDURE — 99233 SBSQ HOSP IP/OBS HIGH 50: CPT | Performed by: SPECIALIST

## 2025-08-01 PROCEDURE — 6370000000 HC RX 637 (ALT 250 FOR IP): Performed by: NURSE PRACTITIONER

## 2025-08-01 PROCEDURE — 6370000000 HC RX 637 (ALT 250 FOR IP): Performed by: INTERNAL MEDICINE

## 2025-08-01 PROCEDURE — 94760 N-INVAS EAR/PLS OXIMETRY 1: CPT

## 2025-08-01 PROCEDURE — 84300 ASSAY OF URINE SODIUM: CPT

## 2025-08-01 PROCEDURE — 51798 US URINE CAPACITY MEASURE: CPT

## 2025-08-01 PROCEDURE — 84439 ASSAY OF FREE THYROXINE: CPT

## 2025-08-01 PROCEDURE — 80053 COMPREHEN METABOLIC PANEL: CPT

## 2025-08-01 PROCEDURE — 97161 PT EVAL LOW COMPLEX 20 MIN: CPT

## 2025-08-01 PROCEDURE — 51701 INSERT BLADDER CATHETER: CPT

## 2025-08-01 PROCEDURE — 2500000003 HC RX 250 WO HCPCS: Performed by: NURSE PRACTITIONER

## 2025-08-01 PROCEDURE — 99232 SBSQ HOSP IP/OBS MODERATE 35: CPT | Performed by: STUDENT IN AN ORGANIZED HEALTH CARE EDUCATION/TRAINING PROGRAM

## 2025-08-01 PROCEDURE — 84100 ASSAY OF PHOSPHORUS: CPT

## 2025-08-01 PROCEDURE — 97165 OT EVAL LOW COMPLEX 30 MIN: CPT

## 2025-08-01 PROCEDURE — 85027 COMPLETE CBC AUTOMATED: CPT

## 2025-08-01 PROCEDURE — 6360000002 HC RX W HCPCS: Performed by: NURSE PRACTITIONER

## 2025-08-01 PROCEDURE — 1200000000 HC SEMI PRIVATE

## 2025-08-01 PROCEDURE — 2580000003 HC RX 258: Performed by: NURSE PRACTITIONER

## 2025-08-01 PROCEDURE — 84443 ASSAY THYROID STIM HORMONE: CPT

## 2025-08-01 PROCEDURE — 81001 URINALYSIS AUTO W/SCOPE: CPT

## 2025-08-01 PROCEDURE — 82570 ASSAY OF URINE CREATININE: CPT

## 2025-08-01 RX ORDER — FUROSEMIDE 10 MG/ML
40 INJECTION INTRAMUSCULAR; INTRAVENOUS ONCE
Status: COMPLETED | OUTPATIENT
Start: 2025-08-01 | End: 2025-08-01

## 2025-08-01 RX ORDER — POTASSIUM CHLORIDE 750 MG/1
40 TABLET, EXTENDED RELEASE ORAL ONCE
Status: COMPLETED | OUTPATIENT
Start: 2025-08-01 | End: 2025-08-01

## 2025-08-01 RX ORDER — ALBUMIN (HUMAN) 12.5 G/50ML
25 SOLUTION INTRAVENOUS EVERY 6 HOURS
Status: COMPLETED | OUTPATIENT
Start: 2025-08-01 | End: 2025-08-02

## 2025-08-01 RX ADMIN — SODIUM CHLORIDE, PRESERVATIVE FREE 10 ML: 5 INJECTION INTRAVENOUS at 21:34

## 2025-08-01 RX ADMIN — SODIUM CHLORIDE: 0.9 INJECTION, SOLUTION INTRAVENOUS at 01:20

## 2025-08-01 RX ADMIN — POTASSIUM CHLORIDE 40 MEQ: 750 TABLET, FILM COATED, EXTENDED RELEASE ORAL at 13:29

## 2025-08-01 RX ADMIN — ENOXAPARIN SODIUM 30 MG: 100 INJECTION SUBCUTANEOUS at 18:13

## 2025-08-01 RX ADMIN — CALCIUM CARBONATE (ANTACID) CHEW TAB 500 MG 500 MG: 500 CHEW TAB at 19:19

## 2025-08-01 RX ADMIN — ALBUMIN (HUMAN) 25 G: 0.25 INJECTION, SOLUTION INTRAVENOUS at 15:05

## 2025-08-01 RX ADMIN — POTASSIUM CHLORIDE 40 MEQ: 750 TABLET, FILM COATED, EXTENDED RELEASE ORAL at 21:33

## 2025-08-01 RX ADMIN — SODIUM CHLORIDE: 0.9 INJECTION, SOLUTION INTRAVENOUS at 11:47

## 2025-08-01 RX ADMIN — CALCIUM CARBONATE (ANTACID) CHEW TAB 500 MG 500 MG: 500 CHEW TAB at 15:00

## 2025-08-01 RX ADMIN — DORZOLAMIDE HYDROCHLORIDE 1 DROP: 20 SOLUTION/ DROPS OPHTHALMIC at 09:02

## 2025-08-01 RX ADMIN — FUROSEMIDE 40 MG: 10 INJECTION, SOLUTION INTRAMUSCULAR; INTRAVENOUS at 21:34

## 2025-08-01 RX ADMIN — SODIUM CHLORIDE, PRESERVATIVE FREE 10 ML: 5 INJECTION INTRAVENOUS at 09:01

## 2025-08-01 RX ADMIN — TIMOLOL MALEATE 1 DROP: 5 SOLUTION/ DROPS OPHTHALMIC at 09:01

## 2025-08-01 RX ADMIN — LATANOPROST 1 DROP: 50 SOLUTION OPHTHALMIC at 21:33

## 2025-08-01 RX ADMIN — ALBUMIN (HUMAN) 25 G: 0.25 INJECTION, SOLUTION INTRAVENOUS at 19:44

## 2025-08-02 ENCOUNTER — APPOINTMENT (OUTPATIENT)
Facility: HOSPITAL | Age: 75
DRG: 435 | End: 2025-08-02
Payer: MEDICARE

## 2025-08-02 LAB
ALBUMIN SERPL-MCNC: 2.6 G/DL (ref 3.5–5.2)
ALBUMIN/GLOB SERPL: 1 (ref 1.1–2.2)
ALP SERPL-CCNC: 1578 U/L (ref 35–104)
ALT SERPL-CCNC: 145 U/L (ref 10–50)
ANION GAP SERPL CALC-SCNC: 12 MMOL/L (ref 2–14)
ANION GAP SERPL CALC-SCNC: 13 MMOL/L (ref 2–14)
ANION GAP SERPL CALC-SCNC: 13 MMOL/L (ref 2–14)
AST SERPL-CCNC: 246 U/L (ref 10–35)
BILIRUB SERPL-MCNC: 22.1 MG/DL (ref 0–1.2)
BUN SERPL-MCNC: 39 MG/DL (ref 8–23)
BUN SERPL-MCNC: 40 MG/DL (ref 8–23)
BUN SERPL-MCNC: 42 MG/DL (ref 8–23)
BUN/CREAT SERPL: 20 (ref 12–20)
BUN/CREAT SERPL: 22 (ref 12–20)
CALCIUM SERPL-MCNC: 10.1 MG/DL (ref 8.8–10.2)
CALCIUM SERPL-MCNC: 10.2 MG/DL (ref 8.8–10.2)
CALCIUM SERPL-MCNC: 10.3 MG/DL (ref 8.8–10.2)
CHLORIDE SERPL-SCNC: 86 MMOL/L (ref 98–107)
CHLORIDE SERPL-SCNC: 88 MMOL/L (ref 98–107)
CHLORIDE SERPL-SCNC: 89 MMOL/L (ref 98–107)
CO2 SERPL-SCNC: 19 MMOL/L (ref 20–29)
CO2 SERPL-SCNC: 20 MMOL/L (ref 20–29)
CO2 SERPL-SCNC: 22 MMOL/L (ref 20–29)
CREAT SERPL-MCNC: 1.83 MG/DL (ref 0.6–1)
CREAT SERPL-MCNC: 1.97 MG/DL (ref 0.6–1)
CREAT SERPL-MCNC: 2.06 MG/DL (ref 0.6–1)
ERYTHROCYTE [DISTWIDTH] IN BLOOD BY AUTOMATED COUNT: 22.4 % (ref 11.5–14.5)
GLOBULIN SER CALC-MCNC: 2.7 G/DL (ref 2–4)
GLUCOSE SERPL-MCNC: 100 MG/DL (ref 65–100)
GLUCOSE SERPL-MCNC: 117 MG/DL (ref 65–100)
GLUCOSE SERPL-MCNC: 119 MG/DL (ref 65–100)
HCT VFR BLD AUTO: 27.9 % (ref 35–47)
HGB BLD-MCNC: 10 G/DL (ref 11.5–16)
MAGNESIUM SERPL-MCNC: 2.2 MG/DL (ref 1.6–2.4)
MCH RBC QN AUTO: 31 PG (ref 26–34)
MCHC RBC AUTO-ENTMCNC: 35.8 G/DL (ref 30–36.5)
MCV RBC AUTO: 86.4 FL (ref 80–99)
NRBC # BLD: 0 K/UL (ref 0–0.01)
NRBC BLD-RTO: 0 PER 100 WBC
PHOSPHATE SERPL-MCNC: 2.2 MG/DL (ref 2.4–4.5)
PLATELET # BLD AUTO: 361 K/UL (ref 150–400)
PMV BLD AUTO: 10 FL (ref 8.9–12.9)
POTASSIUM SERPL-SCNC: 3.7 MMOL/L (ref 3.5–5.1)
POTASSIUM SERPL-SCNC: 4 MMOL/L (ref 3.5–5.1)
POTASSIUM SERPL-SCNC: 4 MMOL/L (ref 3.5–5.1)
PROT SERPL-MCNC: 5.3 G/DL (ref 6.4–8.3)
RBC # BLD AUTO: 3.23 M/UL (ref 3.8–5.2)
SODIUM SERPL-SCNC: 119 MMOL/L (ref 136–145)
SODIUM SERPL-SCNC: 120 MMOL/L (ref 136–145)
SODIUM SERPL-SCNC: 122 MMOL/L (ref 136–145)
WBC # BLD AUTO: 20.6 K/UL (ref 3.6–11)

## 2025-08-02 PROCEDURE — 6360000002 HC RX W HCPCS: Performed by: INTERNAL MEDICINE

## 2025-08-02 PROCEDURE — 80053 COMPREHEN METABOLIC PANEL: CPT

## 2025-08-02 PROCEDURE — 2500000003 HC RX 250 WO HCPCS: Performed by: NURSE PRACTITIONER

## 2025-08-02 PROCEDURE — 1200000000 HC SEMI PRIVATE

## 2025-08-02 PROCEDURE — P9047 ALBUMIN (HUMAN), 25%, 50ML: HCPCS | Performed by: INTERNAL MEDICINE

## 2025-08-02 PROCEDURE — 84100 ASSAY OF PHOSPHORUS: CPT

## 2025-08-02 PROCEDURE — 6370000000 HC RX 637 (ALT 250 FOR IP): Performed by: NURSE PRACTITIONER

## 2025-08-02 PROCEDURE — 94760 N-INVAS EAR/PLS OXIMETRY 1: CPT

## 2025-08-02 PROCEDURE — 83735 ASSAY OF MAGNESIUM: CPT

## 2025-08-02 PROCEDURE — 80048 BASIC METABOLIC PNL TOTAL CA: CPT

## 2025-08-02 PROCEDURE — 85027 COMPLETE CBC AUTOMATED: CPT

## 2025-08-02 PROCEDURE — 6360000002 HC RX W HCPCS: Performed by: NURSE PRACTITIONER

## 2025-08-02 PROCEDURE — 93306 TTE W/DOPPLER COMPLETE: CPT

## 2025-08-02 RX ORDER — ALUMINA, MAGNESIA, AND SIMETHICONE 2400; 2400; 240 MG/30ML; MG/30ML; MG/30ML
30 SUSPENSION ORAL EVERY 12 HOURS PRN
Status: DISCONTINUED | OUTPATIENT
Start: 2025-08-02 | End: 2025-08-07

## 2025-08-02 RX ADMIN — DORZOLAMIDE HYDROCHLORIDE 1 DROP: 20 SOLUTION/ DROPS OPHTHALMIC at 08:21

## 2025-08-02 RX ADMIN — LATANOPROST 1 DROP: 50 SOLUTION OPHTHALMIC at 21:52

## 2025-08-02 RX ADMIN — SODIUM CHLORIDE, PRESERVATIVE FREE 10 ML: 5 INJECTION INTRAVENOUS at 21:52

## 2025-08-02 RX ADMIN — CALCIUM CARBONATE (ANTACID) CHEW TAB 500 MG 500 MG: 500 CHEW TAB at 20:26

## 2025-08-02 RX ADMIN — ALBUMIN (HUMAN) 25 G: 0.25 INJECTION, SOLUTION INTRAVENOUS at 07:25

## 2025-08-02 RX ADMIN — ENOXAPARIN SODIUM 30 MG: 100 INJECTION SUBCUTANEOUS at 19:01

## 2025-08-02 RX ADMIN — TIMOLOL MALEATE 1 DROP: 5 SOLUTION/ DROPS OPHTHALMIC at 08:21

## 2025-08-02 RX ADMIN — ALBUMIN (HUMAN) 25 G: 0.25 INJECTION, SOLUTION INTRAVENOUS at 01:49

## 2025-08-02 RX ADMIN — ALUMINUM HYDROXIDE, MAGNESIUM HYDROXIDE, AND DIMETHICONE 30 ML: 400; 400; 40 SUSPENSION ORAL at 22:27

## 2025-08-02 RX ADMIN — SODIUM CHLORIDE, PRESERVATIVE FREE 10 ML: 5 INJECTION INTRAVENOUS at 08:22

## 2025-08-03 LAB
ALBUMIN SERPL-MCNC: 2.6 G/DL (ref 3.5–5.2)
ALBUMIN/GLOB SERPL: 1.2 (ref 1.1–2.2)
ALP SERPL-CCNC: 1552 U/L (ref 35–104)
ALT SERPL-CCNC: 137 U/L (ref 10–50)
ANION GAP SERPL CALC-SCNC: 13 MMOL/L (ref 2–14)
ANION GAP SERPL CALC-SCNC: 15 MMOL/L (ref 2–14)
AST SERPL-CCNC: 235 U/L (ref 10–35)
BILIRUB SERPL-MCNC: 22 MG/DL (ref 0–1.2)
BUN SERPL-MCNC: 43 MG/DL (ref 8–23)
BUN SERPL-MCNC: 43 MG/DL (ref 8–23)
BUN/CREAT SERPL: 25 (ref 12–20)
CALCIUM SERPL-MCNC: 10 MG/DL (ref 8.8–10.2)
CALCIUM SERPL-MCNC: 9.8 MG/DL (ref 8.8–10.2)
CHLORIDE SERPL-SCNC: 90 MMOL/L (ref 98–107)
CHLORIDE SERPL-SCNC: 90 MMOL/L (ref 98–107)
CO2 SERPL-SCNC: 17 MMOL/L (ref 20–29)
CO2 SERPL-SCNC: 20 MMOL/L (ref 20–29)
CREAT SERPL-MCNC: 1.71 MG/DL (ref 0.6–1)
CREAT SERPL-MCNC: 1.75 MG/DL (ref 0.6–1)
ECHO AO ROOT DIAM: 4 CM
ECHO AO ROOT INDEX: 2.21 CM/M2
ECHO AR MAX VEL PISA: 2.8 M/S
ECHO AV AREA PEAK VELOCITY: 3 CM2
ECHO AV AREA VTI: 3.1 CM2
ECHO AV AREA/BSA PEAK VELOCITY: 1.7 CM2/M2
ECHO AV AREA/BSA VTI: 1.7 CM2/M2
ECHO AV MEAN GRADIENT: 4 MMHG
ECHO AV MEAN VELOCITY: 1 M/S
ECHO AV PEAK GRADIENT: 9 MMHG
ECHO AV PEAK VELOCITY: 1.5 M/S
ECHO AV REGURGITANT PHT: 437.9 MS
ECHO AV VELOCITY RATIO: 1
ECHO AV VTI: 32.4 CM
ECHO BSA: 1.81 M2
ECHO EST RA PRESSURE: 3 MMHG
ECHO LA DIAMETER INDEX: 1.82 CM/M2
ECHO LA DIAMETER: 3.3 CM
ECHO LA TO AORTIC ROOT RATIO: 0.83
ECHO LA VOL A-L A2C: 49 ML (ref 22–52)
ECHO LA VOL A-L A4C: 52 ML (ref 22–52)
ECHO LA VOL MOD A2C: 50 ML (ref 22–52)
ECHO LA VOL MOD A4C: 47 ML (ref 22–52)
ECHO LA VOLUME AREA LENGTH: 51 ML
ECHO LA VOLUME INDEX A-L A2C: 27 ML/M2 (ref 16–34)
ECHO LA VOLUME INDEX A-L A4C: 29 ML/M2 (ref 16–34)
ECHO LA VOLUME INDEX AREA LENGTH: 28 ML/M2 (ref 16–34)
ECHO LA VOLUME INDEX MOD A2C: 28 ML/M2 (ref 16–34)
ECHO LA VOLUME INDEX MOD A4C: 26 ML/M2 (ref 16–34)
ECHO LV E' LATERAL VELOCITY: 13.75 CM/S
ECHO LV E' SEPTAL VELOCITY: 9.01 CM/S
ECHO LV EF PHYSICIAN: 55 %
ECHO LV FRACTIONAL SHORTENING: 39 % (ref 28–44)
ECHO LV INTERNAL DIMENSION DIASTOLE INDEX: 1.99 CM/M2
ECHO LV INTERNAL DIMENSION DIASTOLIC: 3.6 CM (ref 3.9–5.3)
ECHO LV INTERNAL DIMENSION SYSTOLIC INDEX: 1.22 CM/M2
ECHO LV INTERNAL DIMENSION SYSTOLIC: 2.2 CM
ECHO LV IVSD: 1.2 CM (ref 0.6–0.9)
ECHO LV MASS 2D: 141.5 G (ref 67–162)
ECHO LV MASS INDEX 2D: 78.2 G/M2 (ref 43–95)
ECHO LV POSTERIOR WALL DIASTOLIC: 1.2 CM (ref 0.6–0.9)
ECHO LV RELATIVE WALL THICKNESS RATIO: 0.67
ECHO LVOT AREA: 3.1 CM2
ECHO LVOT AV VTI INDEX: 1
ECHO LVOT DIAM: 2 CM
ECHO LVOT MEAN GRADIENT: 3 MMHG
ECHO LVOT PEAK GRADIENT: 9 MMHG
ECHO LVOT PEAK VELOCITY: 1.5 M/S
ECHO LVOT STROKE VOLUME INDEX: 56 ML/M2
ECHO LVOT SV: 101.4 ML
ECHO LVOT VTI: 32.3 CM
ECHO MV A VELOCITY: 0.92 M/S
ECHO MV AREA PHT: 3 CM2
ECHO MV AREA VTI: 4.4 CM2
ECHO MV E DECELERATION TIME (DT): 250.2 MS
ECHO MV E VELOCITY: 0.65 M/S
ECHO MV E/A RATIO: 0.71
ECHO MV E/E' LATERAL: 4.73
ECHO MV E/E' RATIO (AVERAGED): 5.97
ECHO MV E/E' SEPTAL: 7.21
ECHO MV LVOT VTI INDEX: 0.72
ECHO MV MAX VELOCITY: 1.1 M/S
ECHO MV MEAN GRADIENT: 1 MMHG
ECHO MV MEAN VELOCITY: 0.5 M/S
ECHO MV PEAK GRADIENT: 5 MMHG
ECHO MV PRESSURE HALF TIME (PHT): 72.6 MS
ECHO MV REGURGITANT PEAK GRADIENT: 71 MMHG
ECHO MV REGURGITANT PEAK VELOCITY: 4.2 M/S
ECHO MV VTI: 23.2 CM
ECHO PULMONARY ARTERY END DIASTOLIC PRESSURE: 11 MMHG
ECHO RIGHT VENTRICULAR SYSTOLIC PRESSURE (RVSP): 32 MMHG
ECHO TV REGURGITANT MAX VELOCITY: 2.71 M/S
ECHO TV REGURGITANT PEAK GRADIENT: 29 MMHG
ERYTHROCYTE [DISTWIDTH] IN BLOOD BY AUTOMATED COUNT: 22.3 % (ref 11.5–14.5)
GLOBULIN SER CALC-MCNC: 2.1 G/DL (ref 2–4)
GLUCOSE SERPL-MCNC: 108 MG/DL (ref 65–100)
GLUCOSE SERPL-MCNC: 91 MG/DL (ref 65–100)
HCT VFR BLD AUTO: 27.4 % (ref 35–47)
HGB BLD-MCNC: 10.1 G/DL (ref 11.5–16)
MAGNESIUM SERPL-MCNC: 2.3 MG/DL (ref 1.6–2.4)
MCH RBC QN AUTO: 31.7 PG (ref 26–34)
MCHC RBC AUTO-ENTMCNC: 36.9 G/DL (ref 30–36.5)
MCV RBC AUTO: 85.9 FL (ref 80–99)
NRBC # BLD: 0 K/UL (ref 0–0.01)
NRBC BLD-RTO: 0 PER 100 WBC
PHOSPHATE SERPL-MCNC: 1.8 MG/DL (ref 2.4–4.5)
PLATELET # BLD AUTO: 339 K/UL (ref 150–400)
PMV BLD AUTO: 9.9 FL (ref 8.9–12.9)
POTASSIUM SERPL-SCNC: 3.7 MMOL/L (ref 3.5–5.1)
POTASSIUM SERPL-SCNC: 4 MMOL/L (ref 3.5–5.1)
PROT SERPL-MCNC: 4.7 G/DL (ref 6.4–8.3)
RBC # BLD AUTO: 3.19 M/UL (ref 3.8–5.2)
SODIUM SERPL-SCNC: 122 MMOL/L (ref 136–145)
SODIUM SERPL-SCNC: 123 MMOL/L (ref 136–145)
WBC # BLD AUTO: 20 K/UL (ref 3.6–11)

## 2025-08-03 PROCEDURE — 6370000000 HC RX 637 (ALT 250 FOR IP): Performed by: INTERNAL MEDICINE

## 2025-08-03 PROCEDURE — 83735 ASSAY OF MAGNESIUM: CPT

## 2025-08-03 PROCEDURE — 85027 COMPLETE CBC AUTOMATED: CPT

## 2025-08-03 PROCEDURE — 93306 TTE W/DOPPLER COMPLETE: CPT | Performed by: SPECIALIST

## 2025-08-03 PROCEDURE — 2580000003 HC RX 258: Performed by: INTERNAL MEDICINE

## 2025-08-03 PROCEDURE — 6370000000 HC RX 637 (ALT 250 FOR IP): Performed by: HOSPITALIST

## 2025-08-03 PROCEDURE — 2500000003 HC RX 250 WO HCPCS: Performed by: NURSE PRACTITIONER

## 2025-08-03 PROCEDURE — 1200000000 HC SEMI PRIVATE

## 2025-08-03 PROCEDURE — 99232 SBSQ HOSP IP/OBS MODERATE 35: CPT | Performed by: SPECIALIST

## 2025-08-03 PROCEDURE — 80053 COMPREHEN METABOLIC PANEL: CPT

## 2025-08-03 PROCEDURE — 6360000002 HC RX W HCPCS: Performed by: NURSE PRACTITIONER

## 2025-08-03 PROCEDURE — 2500000003 HC RX 250 WO HCPCS: Performed by: INTERNAL MEDICINE

## 2025-08-03 PROCEDURE — 84100 ASSAY OF PHOSPHORUS: CPT

## 2025-08-03 RX ORDER — SODIUM BICARBONATE 650 MG/1
650 TABLET ORAL 2 TIMES DAILY
Status: DISCONTINUED | OUTPATIENT
Start: 2025-08-03 | End: 2025-08-07

## 2025-08-03 RX ADMIN — SODIUM BICARBONATE 650 MG: 650 TABLET ORAL at 11:38

## 2025-08-03 RX ADMIN — TIMOLOL MALEATE 1 DROP: 5 SOLUTION/ DROPS OPHTHALMIC at 21:19

## 2025-08-03 RX ADMIN — SODIUM PHOSPHATE, MONOBASIC, MONOHYDRATE AND SODIUM PHOSPHATE, DIBASIC ANHYDROUS 15 MMOL: 142; 276 INJECTION, SOLUTION INTRAVENOUS at 10:27

## 2025-08-03 RX ADMIN — SODIUM CHLORIDE, PRESERVATIVE FREE 10 ML: 5 INJECTION INTRAVENOUS at 21:19

## 2025-08-03 RX ADMIN — DORZOLAMIDE HYDROCHLORIDE 1 DROP: 20 SOLUTION/ DROPS OPHTHALMIC at 21:19

## 2025-08-03 RX ADMIN — SODIUM CHLORIDE, PRESERVATIVE FREE 10 ML: 5 INJECTION INTRAVENOUS at 09:08

## 2025-08-03 RX ADMIN — DORZOLAMIDE HYDROCHLORIDE 1 DROP: 20 SOLUTION/ DROPS OPHTHALMIC at 07:37

## 2025-08-03 RX ADMIN — SODIUM BICARBONATE 650 MG: 650 TABLET ORAL at 21:18

## 2025-08-03 RX ADMIN — Medication 5 MG: at 16:47

## 2025-08-03 RX ADMIN — ONDANSETRON 4 MG: 2 INJECTION, SOLUTION INTRAMUSCULAR; INTRAVENOUS at 01:20

## 2025-08-03 RX ADMIN — LATANOPROST 1 DROP: 50 SOLUTION OPHTHALMIC at 21:21

## 2025-08-04 ENCOUNTER — ANESTHESIA EVENT (OUTPATIENT)
Facility: HOSPITAL | Age: 75
DRG: 435 | End: 2025-08-04
Payer: MEDICARE

## 2025-08-04 ENCOUNTER — HOSPITAL ENCOUNTER (INPATIENT)
Facility: HOSPITAL | Age: 75
Discharge: HOME | DRG: 435 | End: 2025-08-07
Attending: INTERNAL MEDICINE
Payer: MEDICARE

## 2025-08-04 ENCOUNTER — ANESTHESIA (OUTPATIENT)
Facility: HOSPITAL | Age: 75
DRG: 435 | End: 2025-08-04
Payer: MEDICARE

## 2025-08-04 VITALS
WEIGHT: 140 LBS | HEART RATE: 72 BPM | HEIGHT: 67 IN | OXYGEN SATURATION: 100 % | BODY MASS INDEX: 21.97 KG/M2 | DIASTOLIC BLOOD PRESSURE: 53 MMHG | RESPIRATION RATE: 13 BRPM | SYSTOLIC BLOOD PRESSURE: 97 MMHG | TEMPERATURE: 98.3 F

## 2025-08-04 LAB
25(OH)D3 SERPL-MCNC: 16.5 NG/ML (ref 30–100)
ALBUMIN SERPL-MCNC: 2.2 G/DL (ref 3.5–5.2)
ALBUMIN/GLOB SERPL: 1 (ref 1.1–2.2)
ALP SERPL-CCNC: 1570 U/L (ref 35–104)
ALT SERPL-CCNC: 146 U/L (ref 10–35)
ANION GAP SERPL CALC-SCNC: 16 MMOL/L (ref 2–14)
AST SERPL-CCNC: 272 U/L (ref 10–35)
BILIRUB SERPL-MCNC: 23 MG/DL (ref 0–1.2)
BUN SERPL-MCNC: 44 MG/DL (ref 8–23)
CALCIUM SERPL-MCNC: 9.8 MG/DL (ref 8.8–10.2)
CHLORIDE SERPL-SCNC: 89 MMOL/L (ref 98–107)
CO2 SERPL-SCNC: 18 MMOL/L (ref 20–29)
CREAT SERPL-MCNC: 1.44 MG/DL (ref 0.6–1)
ERYTHROCYTE [DISTWIDTH] IN BLOOD BY AUTOMATED COUNT: 23.6 % (ref 11.5–14.5)
GLOBULIN SER CALC-MCNC: 2.2 G/DL (ref 2–4)
GLUCOSE SERPL-MCNC: 95 MG/DL (ref 65–100)
HCT VFR BLD AUTO: 27.2 % (ref 35–47)
HGB BLD-MCNC: 9.6 G/DL (ref 11.5–16)
MAGNESIUM SERPL-MCNC: 2.3 MG/DL (ref 1.6–2.4)
MCH RBC QN AUTO: 31.7 PG (ref 26–34)
MCHC RBC AUTO-ENTMCNC: 35.3 G/DL (ref 30–36.5)
MCV RBC AUTO: 89.8 FL (ref 80–99)
NRBC # BLD: 0 K/UL (ref 0–0.01)
NRBC BLD-RTO: 0 PER 100 WBC
PHOSPHATE SERPL-MCNC: 2.3 MG/DL (ref 2.5–4.5)
PLATELET # BLD AUTO: 302 K/UL (ref 150–400)
PMV BLD AUTO: 10.5 FL (ref 8.9–12.9)
POTASSIUM SERPL-SCNC: 3.7 MMOL/L (ref 3.5–5.1)
PROT SERPL-MCNC: 4.4 G/DL (ref 6.4–8.3)
RBC # BLD AUTO: 3.03 M/UL (ref 3.8–5.2)
SODIUM SERPL-SCNC: 122 MMOL/L (ref 136–145)
WBC # BLD AUTO: 22.3 K/UL (ref 3.6–11)

## 2025-08-04 PROCEDURE — P9047 ALBUMIN (HUMAN), 25%, 50ML: HCPCS | Performed by: HOSPITALIST

## 2025-08-04 PROCEDURE — 84100 ASSAY OF PHOSPHORUS: CPT

## 2025-08-04 PROCEDURE — 77002 NEEDLE LOCALIZATION BY XRAY: CPT

## 2025-08-04 PROCEDURE — 6370000000 HC RX 637 (ALT 250 FOR IP): Performed by: NURSE PRACTITIONER

## 2025-08-04 PROCEDURE — 1200000000 HC SEMI PRIVATE

## 2025-08-04 PROCEDURE — 2709999900 IR BILIARY DRAIN INT AND EXT

## 2025-08-04 PROCEDURE — 88333 PATH CONSLTJ SURG CYTO XM 1: CPT

## 2025-08-04 PROCEDURE — 3700000001 HC ADD 15 MINUTES (ANESTHESIA)

## 2025-08-04 PROCEDURE — 94760 N-INVAS EAR/PLS OXIMETRY 1: CPT

## 2025-08-04 PROCEDURE — 7100000001 HC PACU RECOVERY - ADDTL 15 MIN

## 2025-08-04 PROCEDURE — 88360 TUMOR IMMUNOHISTOCHEM/MANUAL: CPT

## 2025-08-04 PROCEDURE — 80053 COMPREHEN METABOLIC PANEL: CPT

## 2025-08-04 PROCEDURE — 7100000000 HC PACU RECOVERY - FIRST 15 MIN

## 2025-08-04 PROCEDURE — 6370000000 HC RX 637 (ALT 250 FOR IP): Performed by: INTERNAL MEDICINE

## 2025-08-04 PROCEDURE — 47001 NDL BIOPSY LVR TM OTH MAJ PX: CPT

## 2025-08-04 PROCEDURE — 6360000002 HC RX W HCPCS: Performed by: HOSPITALIST

## 2025-08-04 PROCEDURE — 2580000003 HC RX 258

## 2025-08-04 PROCEDURE — 2500000003 HC RX 250 WO HCPCS: Performed by: NURSE PRACTITIONER

## 2025-08-04 PROCEDURE — 6360000002 HC RX W HCPCS

## 2025-08-04 PROCEDURE — 2500000003 HC RX 250 WO HCPCS

## 2025-08-04 PROCEDURE — 85027 COMPLETE CBC AUTOMATED: CPT

## 2025-08-04 PROCEDURE — BF101ZZ FLUOROSCOPY OF BILE DUCTS USING LOW OSMOLAR CONTRAST: ICD-10-PCS | Performed by: RADIOLOGY

## 2025-08-04 PROCEDURE — 0FB23ZX EXCISION OF LEFT LOBE LIVER, PERCUTANEOUS APPROACH, DIAGNOSTIC: ICD-10-PCS | Performed by: RADIOLOGY

## 2025-08-04 PROCEDURE — 83735 ASSAY OF MAGNESIUM: CPT

## 2025-08-04 PROCEDURE — P9045 ALBUMIN (HUMAN), 5%, 250 ML: HCPCS

## 2025-08-04 PROCEDURE — 88342 IMHCHEM/IMCYTCHM 1ST ANTB: CPT

## 2025-08-04 PROCEDURE — 6360000004 HC RX CONTRAST MEDICATION: Performed by: RADIOLOGY

## 2025-08-04 PROCEDURE — 6360000002 HC RX W HCPCS: Performed by: RADIOLOGY

## 2025-08-04 PROCEDURE — 88341 IMHCHEM/IMCYTCHM EA ADD ANTB: CPT

## 2025-08-04 PROCEDURE — 0F9630Z DRAINAGE OF LEFT HEPATIC DUCT WITH DRAINAGE DEVICE, PERCUTANEOUS APPROACH: ICD-10-PCS | Performed by: RADIOLOGY

## 2025-08-04 PROCEDURE — 82306 VITAMIN D 25 HYDROXY: CPT

## 2025-08-04 PROCEDURE — 88307 TISSUE EXAM BY PATHOLOGIST: CPT

## 2025-08-04 PROCEDURE — 6370000000 HC RX 637 (ALT 250 FOR IP): Performed by: HOSPITALIST

## 2025-08-04 PROCEDURE — 3700000000 HC ANESTHESIA ATTENDED CARE

## 2025-08-04 PROCEDURE — 2500000003 HC RX 250 WO HCPCS: Performed by: RADIOLOGY

## 2025-08-04 RX ORDER — FENTANYL CITRATE 50 UG/ML
INJECTION, SOLUTION INTRAMUSCULAR; INTRAVENOUS
Status: DISCONTINUED | OUTPATIENT
Start: 2025-08-04 | End: 2025-08-04 | Stop reason: SDUPTHER

## 2025-08-04 RX ORDER — OXYCODONE HYDROCHLORIDE 5 MG/1
5 TABLET ORAL EVERY 6 HOURS PRN
Refills: 0 | Status: DISCONTINUED | OUTPATIENT
Start: 2025-08-04 | End: 2025-08-05

## 2025-08-04 RX ORDER — SODIUM CHLORIDE 9 MG/ML
INJECTION, SOLUTION INTRAVENOUS
Status: DISCONTINUED | OUTPATIENT
Start: 2025-08-04 | End: 2025-08-04 | Stop reason: SDUPTHER

## 2025-08-04 RX ORDER — ROCURONIUM BROMIDE 10 MG/ML
INJECTION, SOLUTION INTRAVENOUS
Status: DISCONTINUED | OUTPATIENT
Start: 2025-08-04 | End: 2025-08-04 | Stop reason: SDUPTHER

## 2025-08-04 RX ORDER — PROPOFOL 10 MG/ML
INJECTION, EMULSION INTRAVENOUS
Status: DISCONTINUED | OUTPATIENT
Start: 2025-08-04 | End: 2025-08-04 | Stop reason: SDUPTHER

## 2025-08-04 RX ORDER — IOPAMIDOL 755 MG/ML
INJECTION, SOLUTION INTRAVASCULAR PRN
Status: COMPLETED | OUTPATIENT
Start: 2025-08-04 | End: 2025-08-04

## 2025-08-04 RX ORDER — ALBUMIN (HUMAN) 12.5 G/50ML
25 SOLUTION INTRAVENOUS EVERY 6 HOURS
Status: COMPLETED | OUTPATIENT
Start: 2025-08-04 | End: 2025-08-05

## 2025-08-04 RX ORDER — LIDOCAINE HYDROCHLORIDE 20 MG/ML
INJECTION, SOLUTION EPIDURAL; INFILTRATION; INTRACAUDAL; PERINEURAL
Status: DISCONTINUED | OUTPATIENT
Start: 2025-08-04 | End: 2025-08-04 | Stop reason: SDUPTHER

## 2025-08-04 RX ORDER — ALBUMIN HUMAN 50 G/1000ML
SOLUTION INTRAVENOUS
Status: COMPLETED
Start: 2025-08-04 | End: 2025-08-04

## 2025-08-04 RX ORDER — ONDANSETRON 2 MG/ML
INJECTION INTRAMUSCULAR; INTRAVENOUS
Status: DISCONTINUED | OUTPATIENT
Start: 2025-08-04 | End: 2025-08-04 | Stop reason: SDUPTHER

## 2025-08-04 RX ORDER — ALBUMIN HUMAN 50 G/1000ML
12.5 SOLUTION INTRAVENOUS ONCE
Status: COMPLETED | OUTPATIENT
Start: 2025-08-04 | End: 2025-08-04

## 2025-08-04 RX ORDER — PHENYLEPHRINE HCL IN 0.9% NACL 0.4MG/10ML
SYRINGE (ML) INTRAVENOUS
Status: DISCONTINUED | OUTPATIENT
Start: 2025-08-04 | End: 2025-08-04 | Stop reason: SDUPTHER

## 2025-08-04 RX ORDER — LIDOCAINE HYDROCHLORIDE 10 MG/ML
INJECTION, SOLUTION EPIDURAL; INFILTRATION; INTRACAUDAL; PERINEURAL PRN
Status: COMPLETED | OUTPATIENT
Start: 2025-08-04 | End: 2025-08-04

## 2025-08-04 RX ORDER — EPHEDRINE SULFATE 50 MG/ML
INJECTION INTRAVENOUS
Status: DISCONTINUED | OUTPATIENT
Start: 2025-08-04 | End: 2025-08-04 | Stop reason: SDUPTHER

## 2025-08-04 RX ORDER — SUCCINYLCHOLINE/SOD CL,ISO/PF 200MG/10ML
SYRINGE (ML) INTRAVENOUS
Status: DISCONTINUED | OUTPATIENT
Start: 2025-08-04 | End: 2025-08-04 | Stop reason: SDUPTHER

## 2025-08-04 RX ORDER — DEXAMETHASONE SODIUM PHOSPHATE 4 MG/ML
INJECTION, SOLUTION INTRA-ARTICULAR; INTRALESIONAL; INTRAMUSCULAR; INTRAVENOUS; SOFT TISSUE
Status: DISCONTINUED | OUTPATIENT
Start: 2025-08-04 | End: 2025-08-04 | Stop reason: SDUPTHER

## 2025-08-04 RX ADMIN — ROCURONIUM BROMIDE 10 MG: 10 INJECTION, SOLUTION INTRAVENOUS at 09:32

## 2025-08-04 RX ADMIN — PHENYLEPHRINE HYDROCHLORIDE 80 MCG/MIN: 10 INJECTION INTRAVENOUS at 09:02

## 2025-08-04 RX ADMIN — PROPOFOL 100 MG: 10 INJECTION, EMULSION INTRAVENOUS at 08:51

## 2025-08-04 RX ADMIN — DEXAMETHASONE SODIUM PHOSPHATE 4 MG: 4 INJECTION INTRA-ARTICULAR; INTRALESIONAL; INTRAMUSCULAR; INTRAVENOUS; SOFT TISSUE at 09:14

## 2025-08-04 RX ADMIN — LIDOCAINE HYDROCHLORIDE 5 ML: 10 INJECTION, SOLUTION EPIDURAL; INFILTRATION; INTRACAUDAL; PERINEURAL at 09:12

## 2025-08-04 RX ADMIN — Medication 80 MCG: at 10:13

## 2025-08-04 RX ADMIN — ALBUMIN (HUMAN) 25 G: 0.25 INJECTION, SOLUTION INTRAVENOUS at 17:54

## 2025-08-04 RX ADMIN — IOPAMIDOL 17 ML: 755 INJECTION, SOLUTION INTRAVENOUS at 09:46

## 2025-08-04 RX ADMIN — ALBUMIN (HUMAN) 25 G: 0.25 INJECTION, SOLUTION INTRAVENOUS at 13:48

## 2025-08-04 RX ADMIN — FENTANYL CITRATE 25 MCG: 50 INJECTION INTRAMUSCULAR; INTRAVENOUS at 09:28

## 2025-08-04 RX ADMIN — Medication 80 MCG: at 09:00

## 2025-08-04 RX ADMIN — SODIUM CHLORIDE: 9 INJECTION, SOLUTION INTRAVENOUS at 08:49

## 2025-08-04 RX ADMIN — DORZOLAMIDE HYDROCHLORIDE 1 DROP: 20 SOLUTION/ DROPS OPHTHALMIC at 20:44

## 2025-08-04 RX ADMIN — ACETAMINOPHEN 650 MG: 325 TABLET ORAL at 21:47

## 2025-08-04 RX ADMIN — ALBUMIN (HUMAN) 25 G: 0.25 INJECTION, SOLUTION INTRAVENOUS at 23:50

## 2025-08-04 RX ADMIN — Medication 200 MCG: at 08:57

## 2025-08-04 RX ADMIN — SODIUM CHLORIDE, PRESERVATIVE FREE 10 ML: 5 INJECTION INTRAVENOUS at 20:44

## 2025-08-04 RX ADMIN — FENTANYL CITRATE 25 MCG: 50 INJECTION INTRAMUSCULAR; INTRAVENOUS at 08:51

## 2025-08-04 RX ADMIN — TIMOLOL MALEATE 1 DROP: 5 SOLUTION/ DROPS OPHTHALMIC at 20:44

## 2025-08-04 RX ADMIN — ROCURONIUM BROMIDE 10 MG: 10 INJECTION, SOLUTION INTRAVENOUS at 08:51

## 2025-08-04 RX ADMIN — ALBUMIN HUMAN 12.5 G: 50 SOLUTION INTRAVENOUS at 10:24

## 2025-08-04 RX ADMIN — Medication 5 MG: at 11:54

## 2025-08-04 RX ADMIN — LATANOPROST 1 DROP: 50 SOLUTION OPHTHALMIC at 20:44

## 2025-08-04 RX ADMIN — WATER 2000 MG: 1 INJECTION INTRAMUSCULAR; INTRAVENOUS; SUBCUTANEOUS at 08:33

## 2025-08-04 RX ADMIN — Medication 120 MCG: at 08:51

## 2025-08-04 RX ADMIN — OXYCODONE 5 MG: 5 TABLET ORAL at 23:14

## 2025-08-04 RX ADMIN — Medication 100 MG: at 08:51

## 2025-08-04 RX ADMIN — SUGAMMADEX 200 MG: 100 INJECTION, SOLUTION INTRAVENOUS at 09:55

## 2025-08-04 RX ADMIN — LIDOCAINE HYDROCHLORIDE 5 ML: 10 INJECTION, SOLUTION EPIDURAL; INFILTRATION; INTRACAUDAL; PERINEURAL at 09:32

## 2025-08-04 RX ADMIN — ALBUMIN (HUMAN) 12.5 G: 12.5 INJECTION, SOLUTION INTRAVENOUS at 10:24

## 2025-08-04 RX ADMIN — EPHEDRINE SULFATE 10 MG: 50 INJECTION INTRAVENOUS at 09:00

## 2025-08-04 RX ADMIN — ACETAMINOPHEN 650 MG: 325 TABLET ORAL at 15:45

## 2025-08-04 RX ADMIN — ONDANSETRON 4 MG: 2 INJECTION, SOLUTION INTRAMUSCULAR; INTRAVENOUS at 09:50

## 2025-08-04 RX ADMIN — SODIUM BICARBONATE 650 MG: 650 TABLET ORAL at 20:44

## 2025-08-04 RX ADMIN — SODIUM BICARBONATE 650 MG: 650 TABLET ORAL at 11:54

## 2025-08-04 RX ADMIN — EPHEDRINE SULFATE 10 MG: 50 INJECTION INTRAVENOUS at 09:05

## 2025-08-04 RX ADMIN — LIDOCAINE HYDROCHLORIDE 60 MG: 20 INJECTION, SOLUTION EPIDURAL; INFILTRATION; INTRACAUDAL; PERINEURAL at 08:51

## 2025-08-04 ASSESSMENT — PAIN - FUNCTIONAL ASSESSMENT
PAIN_FUNCTIONAL_ASSESSMENT: ACTIVITIES ARE NOT PREVENTED
PAIN_FUNCTIONAL_ASSESSMENT: NONE - DENIES PAIN
PAIN_FUNCTIONAL_ASSESSMENT: NONE - DENIES PAIN
PAIN_FUNCTIONAL_ASSESSMENT: 0-10
PAIN_FUNCTIONAL_ASSESSMENT: NONE - DENIES PAIN
PAIN_FUNCTIONAL_ASSESSMENT: ACTIVITIES ARE NOT PREVENTED

## 2025-08-04 ASSESSMENT — PAIN DESCRIPTION - LOCATION
LOCATION: ABDOMEN
LOCATION: ABDOMEN
LOCATION: BACK
LOCATION: ABDOMEN

## 2025-08-04 ASSESSMENT — PAIN DESCRIPTION - ORIENTATION
ORIENTATION: LOWER
ORIENTATION: RIGHT
ORIENTATION: MID
ORIENTATION: MID

## 2025-08-04 ASSESSMENT — PAIN SCALES - GENERAL
PAINLEVEL_OUTOF10: 7
PAINLEVEL_OUTOF10: 6
PAINLEVEL_OUTOF10: 7
PAINLEVEL_OUTOF10: 7
PAINLEVEL_OUTOF10: 6

## 2025-08-04 ASSESSMENT — PAIN DESCRIPTION - DESCRIPTORS
DESCRIPTORS: ACHING
DESCRIPTORS: SORE
DESCRIPTORS: ACHING

## 2025-08-05 PROBLEM — Z71.89 DNR (DO NOT RESUSCITATE) DISCUSSION: Status: ACTIVE | Noted: 2025-08-05

## 2025-08-05 PROBLEM — R10.11 RIGHT UPPER QUADRANT ABDOMINAL PAIN: Status: ACTIVE | Noted: 2025-08-05

## 2025-08-05 PROBLEM — Z71.89 GOALS OF CARE, COUNSELING/DISCUSSION: Status: ACTIVE | Noted: 2025-08-05

## 2025-08-05 LAB
ALBUMIN SERPL-MCNC: 3.6 G/DL (ref 3.5–5.2)
ALBUMIN/GLOB SERPL: 1.8 (ref 1.1–2.2)
ALP SERPL-CCNC: 1216 U/L (ref 35–104)
ALT SERPL-CCNC: 91 U/L (ref 10–35)
ANION GAP SERPL CALC-SCNC: 16 MMOL/L (ref 2–14)
AST SERPL-CCNC: 196 U/L (ref 10–35)
BASOPHILS # BLD: 0.04 K/UL (ref 0–0.1)
BASOPHILS NFR BLD: 0.2 % (ref 0–1)
BILIRUB SERPL-MCNC: 21.5 MG/DL (ref 0–1.2)
BUN SERPL-MCNC: 43 MG/DL (ref 8–23)
BUN/CREAT SERPL: 31 (ref 12–20)
CALCIUM SERPL-MCNC: 9.8 MG/DL (ref 8.8–10.2)
CHLORIDE SERPL-SCNC: 89 MMOL/L (ref 98–107)
CHOLEST SERPL-MCNC: 327 MG/DL (ref 0–200)
CO2 SERPL-SCNC: 18 MMOL/L (ref 20–29)
CREAT SERPL-MCNC: 1.35 MG/DL (ref 0.6–1)
DIFFERENTIAL METHOD BLD: ABNORMAL
EOSINOPHIL # BLD: 0.04 K/UL (ref 0–0.4)
EOSINOPHIL NFR BLD: 0.2 % (ref 0–7)
ERYTHROCYTE [DISTWIDTH] IN BLOOD BY AUTOMATED COUNT: 22.7 % (ref 11.5–14.5)
GLOBULIN SER CALC-MCNC: 2 G/DL (ref 2–4)
GLUCOSE SERPL-MCNC: 86 MG/DL (ref 65–100)
HCT VFR BLD AUTO: 26.4 % (ref 35–47)
HDLC SERPL-MCNC: 17 MG/DL (ref 40–60)
HDLC SERPL: 19.5
HGB BLD-MCNC: 9.4 G/DL (ref 11.5–16)
IMM GRANULOCYTES # BLD AUTO: 0.36 K/UL (ref 0–0.04)
IMM GRANULOCYTES NFR BLD AUTO: 1.6 % (ref 0–0.5)
LDLC SERPL CALC-MCNC: 280 MG/DL
LYMPHOCYTES # BLD: 0.4 K/UL (ref 0.8–3.5)
LYMPHOCYTES NFR BLD: 1.8 % (ref 12–49)
MCH RBC QN AUTO: 31.3 PG (ref 26–34)
MCHC RBC AUTO-ENTMCNC: 35.6 G/DL (ref 30–36.5)
MCV RBC AUTO: 88 FL (ref 80–99)
MONOCYTES # BLD: 0.74 K/UL (ref 0–1)
MONOCYTES NFR BLD: 3.3 % (ref 5–13)
NEUTS SEG # BLD: 20.82 K/UL (ref 1.8–8)
NEUTS SEG NFR BLD: 92.9 % (ref 32–75)
NRBC # BLD: 0 K/UL (ref 0–0.01)
NRBC BLD-RTO: 0 PER 100 WBC
PLATELET # BLD AUTO: 295 K/UL (ref 150–400)
PMV BLD AUTO: 10.1 FL (ref 8.9–12.9)
POTASSIUM SERPL-SCNC: 3.5 MMOL/L (ref 3.5–5.1)
PROT SERPL-MCNC: 5.6 G/DL (ref 6.4–8.3)
RBC # BLD AUTO: 3 M/UL (ref 3.8–5.2)
RBC MORPH BLD: ABNORMAL
RBC MORPH BLD: ABNORMAL
SODIUM SERPL-SCNC: 124 MMOL/L (ref 136–145)
TRIGL SERPL-MCNC: 153 MG/DL (ref 0–150)
VLDLC SERPL CALC-MCNC: 31 MG/DL
WBC # BLD AUTO: 22.4 K/UL (ref 3.6–11)

## 2025-08-05 PROCEDURE — 6370000000 HC RX 637 (ALT 250 FOR IP): Performed by: HOSPITALIST

## 2025-08-05 PROCEDURE — 6360000002 HC RX W HCPCS: Performed by: HOSPITALIST

## 2025-08-05 PROCEDURE — 6370000000 HC RX 637 (ALT 250 FOR IP): Performed by: INTERNAL MEDICINE

## 2025-08-05 PROCEDURE — 94760 N-INVAS EAR/PLS OXIMETRY 1: CPT

## 2025-08-05 PROCEDURE — 99223 1ST HOSP IP/OBS HIGH 75: CPT | Performed by: INTERNAL MEDICINE

## 2025-08-05 PROCEDURE — 2500000003 HC RX 250 WO HCPCS: Performed by: NURSE PRACTITIONER

## 2025-08-05 PROCEDURE — 97530 THERAPEUTIC ACTIVITIES: CPT

## 2025-08-05 PROCEDURE — 80053 COMPREHEN METABOLIC PANEL: CPT

## 2025-08-05 PROCEDURE — 99232 SBSQ HOSP IP/OBS MODERATE 35: CPT | Performed by: PHYSICIAN ASSISTANT

## 2025-08-05 PROCEDURE — 6360000002 HC RX W HCPCS: Performed by: NURSE PRACTITIONER

## 2025-08-05 PROCEDURE — 6370000000 HC RX 637 (ALT 250 FOR IP): Performed by: NURSE PRACTITIONER

## 2025-08-05 PROCEDURE — 80061 LIPID PANEL: CPT

## 2025-08-05 PROCEDURE — 94640 AIRWAY INHALATION TREATMENT: CPT

## 2025-08-05 PROCEDURE — 85025 COMPLETE CBC W/AUTO DIFF WBC: CPT

## 2025-08-05 PROCEDURE — 1100000000 HC RM PRIVATE

## 2025-08-05 PROCEDURE — P9047 ALBUMIN (HUMAN), 25%, 50ML: HCPCS | Performed by: HOSPITALIST

## 2025-08-05 RX ORDER — IPRATROPIUM BROMIDE AND ALBUTEROL SULFATE 2.5; .5 MG/3ML; MG/3ML
1 SOLUTION RESPIRATORY (INHALATION) EVERY 4 HOURS PRN
Status: DISCONTINUED | OUTPATIENT
Start: 2025-08-05 | End: 2025-08-08 | Stop reason: HOSPADM

## 2025-08-05 RX ORDER — OXYCODONE HYDROCHLORIDE 5 MG/1
10 TABLET ORAL EVERY 6 HOURS PRN
Refills: 0 | Status: DISCONTINUED | OUTPATIENT
Start: 2025-08-05 | End: 2025-08-06

## 2025-08-05 RX ORDER — SENNA AND DOCUSATE SODIUM 50; 8.6 MG/1; MG/1
2 TABLET, FILM COATED ORAL DAILY
Status: DISCONTINUED | OUTPATIENT
Start: 2025-08-05 | End: 2025-08-07

## 2025-08-05 RX ORDER — POLYETHYLENE GLYCOL 3350 17 G/17G
17 POWDER, FOR SOLUTION ORAL DAILY
Status: DISCONTINUED | OUTPATIENT
Start: 2025-08-05 | End: 2025-08-05

## 2025-08-05 RX ORDER — POLYETHYLENE GLYCOL 3350 17 G/17G
17 POWDER, FOR SOLUTION ORAL DAILY
Status: DISCONTINUED | OUTPATIENT
Start: 2025-08-05 | End: 2025-08-07

## 2025-08-05 RX ORDER — HYDROMORPHONE HYDROCHLORIDE 1 MG/ML
1 INJECTION, SOLUTION INTRAMUSCULAR; INTRAVENOUS; SUBCUTANEOUS EVERY 4 HOURS PRN
Status: DISCONTINUED | OUTPATIENT
Start: 2025-08-05 | End: 2025-08-05

## 2025-08-05 RX ORDER — GUAIFENESIN 200 MG/10ML
200 LIQUID ORAL EVERY 4 HOURS PRN
Status: DISCONTINUED | OUTPATIENT
Start: 2025-08-05 | End: 2025-08-07

## 2025-08-05 RX ADMIN — DOCUSATE SODIUM 50MG AND SENNOSIDES 8.6MG 2 TABLET: 8.6; 5 TABLET, FILM COATED ORAL at 12:20

## 2025-08-05 RX ADMIN — GUAIFENESIN 200 MG: 200 SOLUTION ORAL at 21:11

## 2025-08-05 RX ADMIN — SODIUM CHLORIDE, PRESERVATIVE FREE 10 ML: 5 INJECTION INTRAVENOUS at 20:46

## 2025-08-05 RX ADMIN — DORZOLAMIDE HYDROCHLORIDE 1 DROP: 20 SOLUTION/ DROPS OPHTHALMIC at 09:51

## 2025-08-05 RX ADMIN — SODIUM BICARBONATE 650 MG: 650 TABLET ORAL at 20:46

## 2025-08-05 RX ADMIN — HYDROMORPHONE HYDROCHLORIDE 0.5 MG: 1 INJECTION, SOLUTION INTRAMUSCULAR; INTRAVENOUS; SUBCUTANEOUS at 15:29

## 2025-08-05 RX ADMIN — IPRATROPIUM BROMIDE AND ALBUTEROL SULFATE 1 DOSE: .5; 3 SOLUTION RESPIRATORY (INHALATION) at 20:46

## 2025-08-05 RX ADMIN — HYDROMORPHONE HYDROCHLORIDE 1 MG: 1 INJECTION, SOLUTION INTRAMUSCULAR; INTRAVENOUS; SUBCUTANEOUS at 12:19

## 2025-08-05 RX ADMIN — SODIUM BICARBONATE 650 MG: 650 TABLET ORAL at 09:51

## 2025-08-05 RX ADMIN — POLYETHYLENE GLYCOL 3350 17 G: 17 POWDER, FOR SOLUTION ORAL at 12:20

## 2025-08-05 RX ADMIN — TIMOLOL MALEATE 1 DROP: 5 SOLUTION/ DROPS OPHTHALMIC at 09:51

## 2025-08-05 RX ADMIN — DORZOLAMIDE HYDROCHLORIDE 1 DROP: 20 SOLUTION/ DROPS OPHTHALMIC at 20:46

## 2025-08-05 RX ADMIN — LATANOPROST 1 DROP: 50 SOLUTION OPHTHALMIC at 20:46

## 2025-08-05 RX ADMIN — SODIUM CHLORIDE, PRESERVATIVE FREE 10 ML: 5 INJECTION INTRAVENOUS at 09:52

## 2025-08-05 RX ADMIN — HYDROMORPHONE HYDROCHLORIDE 0.5 MG: 1 INJECTION, SOLUTION INTRAMUSCULAR; INTRAVENOUS; SUBCUTANEOUS at 00:35

## 2025-08-05 RX ADMIN — TIMOLOL MALEATE 1 DROP: 5 SOLUTION/ DROPS OPHTHALMIC at 20:46

## 2025-08-05 RX ADMIN — IPRATROPIUM BROMIDE AND ALBUTEROL SULFATE 1 DOSE: .5; 3 SOLUTION RESPIRATORY (INHALATION) at 16:42

## 2025-08-05 RX ADMIN — ALBUMIN (HUMAN) 25 G: 0.25 INJECTION, SOLUTION INTRAVENOUS at 06:09

## 2025-08-05 RX ADMIN — ACETAMINOPHEN 650 MG: 325 TABLET ORAL at 04:12

## 2025-08-05 RX ADMIN — OXYCODONE 5 MG: 5 TABLET ORAL at 05:27

## 2025-08-05 ASSESSMENT — PAIN - FUNCTIONAL ASSESSMENT
PAIN_FUNCTIONAL_ASSESSMENT: ACTIVITIES ARE NOT PREVENTED

## 2025-08-05 ASSESSMENT — PAIN DESCRIPTION - ORIENTATION
ORIENTATION: MID

## 2025-08-05 ASSESSMENT — PAIN SCALES - GENERAL
PAINLEVEL_OUTOF10: 7
PAINLEVEL_OUTOF10: 8
PAINLEVEL_OUTOF10: 7
PAINLEVEL_OUTOF10: 7
PAINLEVEL_OUTOF10: 8
PAINLEVEL_OUTOF10: 4
PAINLEVEL_OUTOF10: 7

## 2025-08-05 ASSESSMENT — PAIN DESCRIPTION - LOCATION
LOCATION: ABDOMEN

## 2025-08-05 ASSESSMENT — PAIN DESCRIPTION - DESCRIPTORS
DESCRIPTORS: ACHING

## 2025-08-05 ASSESSMENT — PAIN SCALES - WONG BAKER: WONGBAKER_NUMERICALRESPONSE: NO HURT

## 2025-08-06 LAB
ALBUMIN SERPL-MCNC: 3.1 G/DL (ref 3.5–5.2)
ALBUMIN/GLOB SERPL: 1.4 (ref 1.1–2.2)
ALP SERPL-CCNC: 835 U/L (ref 35–104)
ALT SERPL-CCNC: 59 U/L (ref 10–35)
ANION GAP SERPL CALC-SCNC: 15 MMOL/L (ref 2–14)
AST SERPL-CCNC: 152 U/L (ref 10–35)
BASOPHILS # BLD: 0 K/UL (ref 0–0.1)
BASOPHILS NFR BLD: 0 % (ref 0–1)
BILIRUB SERPL-MCNC: 15.2 MG/DL (ref 0–1.2)
BUN SERPL-MCNC: 59 MG/DL (ref 8–23)
CALCIUM SERPL-MCNC: 9.2 MG/DL (ref 8.8–10.2)
CHLORIDE SERPL-SCNC: 88 MMOL/L (ref 98–107)
CHOLEST SERPL-MCNC: 272 MG/DL (ref 0–200)
CO2 SERPL-SCNC: 19 MMOL/L (ref 20–29)
CREAT SERPL-MCNC: 2.04 MG/DL (ref 0.6–1)
DIFFERENTIAL METHOD BLD: ABNORMAL
EOSINOPHIL # BLD: 0 K/UL (ref 0–0.4)
EOSINOPHIL NFR BLD: 0 % (ref 0–7)
ERYTHROCYTE [DISTWIDTH] IN BLOOD BY AUTOMATED COUNT: 23.3 % (ref 11.5–14.5)
GLOBULIN SER CALC-MCNC: 2.2 G/DL (ref 2–4)
GLUCOSE SERPL-MCNC: 54 MG/DL (ref 65–100)
HCT VFR BLD AUTO: 25.5 % (ref 35–47)
HDLC SERPL-MCNC: 18 MG/DL (ref 40–60)
HDLC SERPL: 15.5
HGB BLD-MCNC: 8.9 G/DL (ref 11.5–16)
IMM GRANULOCYTES # BLD AUTO: 0 K/UL
IMM GRANULOCYTES NFR BLD AUTO: 0 %
LDLC SERPL CALC-MCNC: 226 MG/DL
LYMPHOCYTES # BLD: 0.27 K/UL (ref 0.8–3.5)
LYMPHOCYTES NFR BLD: 1 % (ref 12–49)
MCH RBC QN AUTO: 31.9 PG (ref 26–34)
MCHC RBC AUTO-ENTMCNC: 34.9 G/DL (ref 30–36.5)
MCV RBC AUTO: 91.4 FL (ref 80–99)
MONOCYTES # BLD: 0.8 K/UL (ref 0–1)
MONOCYTES NFR BLD: 3 % (ref 5–13)
NEUTS BAND NFR BLD MANUAL: 1 % (ref 0–6)
NEUTS SEG # BLD: 25.53 K/UL (ref 1.8–8)
NEUTS SEG NFR BLD: 95 % (ref 32–75)
NRBC # BLD: 0.03 K/UL (ref 0–0.01)
NRBC BLD-RTO: 0.1 PER 100 WBC
PLATELET # BLD AUTO: 253 K/UL (ref 150–400)
PMV BLD AUTO: 10.7 FL (ref 8.9–12.9)
POTASSIUM SERPL-SCNC: 4.3 MMOL/L (ref 3.5–5.1)
PROT SERPL-MCNC: 5.3 G/DL (ref 6.4–8.3)
RBC # BLD AUTO: 2.79 M/UL (ref 3.8–5.2)
RBC MORPH BLD: ABNORMAL
SODIUM SERPL-SCNC: 123 MMOL/L (ref 136–145)
TRIGL SERPL-MCNC: 141 MG/DL (ref 0–150)
VLDLC SERPL CALC-MCNC: 28 MG/DL
WBC # BLD AUTO: 26.6 K/UL (ref 3.6–11)

## 2025-08-06 PROCEDURE — 80053 COMPREHEN METABOLIC PANEL: CPT

## 2025-08-06 PROCEDURE — P9047 ALBUMIN (HUMAN), 25%, 50ML: HCPCS | Performed by: INTERNAL MEDICINE

## 2025-08-06 PROCEDURE — 99233 SBSQ HOSP IP/OBS HIGH 50: CPT | Performed by: INTERNAL MEDICINE

## 2025-08-06 PROCEDURE — 6360000002 HC RX W HCPCS: Performed by: HOSPITALIST

## 2025-08-06 PROCEDURE — 2500000003 HC RX 250 WO HCPCS: Performed by: NURSE PRACTITIONER

## 2025-08-06 PROCEDURE — 85025 COMPLETE CBC W/AUTO DIFF WBC: CPT

## 2025-08-06 PROCEDURE — 6370000000 HC RX 637 (ALT 250 FOR IP): Performed by: NURSE PRACTITIONER

## 2025-08-06 PROCEDURE — 6360000002 HC RX W HCPCS: Performed by: NURSE PRACTITIONER

## 2025-08-06 PROCEDURE — 6370000000 HC RX 637 (ALT 250 FOR IP): Performed by: INTERNAL MEDICINE

## 2025-08-06 PROCEDURE — 6360000002 HC RX W HCPCS: Performed by: INTERNAL MEDICINE

## 2025-08-06 PROCEDURE — 94760 N-INVAS EAR/PLS OXIMETRY 1: CPT

## 2025-08-06 PROCEDURE — 97530 THERAPEUTIC ACTIVITIES: CPT

## 2025-08-06 PROCEDURE — 80061 LIPID PANEL: CPT

## 2025-08-06 PROCEDURE — 99232 SBSQ HOSP IP/OBS MODERATE 35: CPT | Performed by: PHYSICIAN ASSISTANT

## 2025-08-06 PROCEDURE — 97110 THERAPEUTIC EXERCISES: CPT

## 2025-08-06 PROCEDURE — 1100000000 HC RM PRIVATE

## 2025-08-06 RX ORDER — ALBUMIN (HUMAN) 12.5 G/50ML
12.5 SOLUTION INTRAVENOUS EVERY 8 HOURS
Status: DISCONTINUED | OUTPATIENT
Start: 2025-08-06 | End: 2025-08-07

## 2025-08-06 RX ORDER — OXYCODONE HYDROCHLORIDE 5 MG/1
10 TABLET ORAL EVERY 8 HOURS PRN
Refills: 0 | Status: DISCONTINUED | OUTPATIENT
Start: 2025-08-06 | End: 2025-08-07

## 2025-08-06 RX ORDER — OXYCODONE HYDROCHLORIDE 5 MG/1
10 TABLET ORAL EVERY 8 HOURS
Refills: 0 | Status: DISCONTINUED | OUTPATIENT
Start: 2025-08-06 | End: 2025-08-07

## 2025-08-06 RX ADMIN — ALBUMIN (HUMAN) 12.5 G: 0.25 INJECTION, SOLUTION INTRAVENOUS at 16:34

## 2025-08-06 RX ADMIN — HYDROMORPHONE HYDROCHLORIDE 0.5 MG: 1 INJECTION, SOLUTION INTRAMUSCULAR; INTRAVENOUS; SUBCUTANEOUS at 10:08

## 2025-08-06 RX ADMIN — ALBUMIN (HUMAN) 12.5 G: 0.25 INJECTION, SOLUTION INTRAVENOUS at 09:33

## 2025-08-06 RX ADMIN — HYDROMORPHONE HYDROCHLORIDE 0.5 MG: 1 INJECTION, SOLUTION INTRAMUSCULAR; INTRAVENOUS; SUBCUTANEOUS at 05:37

## 2025-08-06 RX ADMIN — GUAIFENESIN 200 MG: 200 SOLUTION ORAL at 05:37

## 2025-08-06 RX ADMIN — TIMOLOL MALEATE 1 DROP: 5 SOLUTION/ DROPS OPHTHALMIC at 21:20

## 2025-08-06 RX ADMIN — SODIUM CHLORIDE, PRESERVATIVE FREE 10 ML: 5 INJECTION INTRAVENOUS at 21:26

## 2025-08-06 RX ADMIN — TIMOLOL MALEATE 1 DROP: 5 SOLUTION/ DROPS OPHTHALMIC at 09:27

## 2025-08-06 RX ADMIN — SODIUM BICARBONATE 650 MG: 650 TABLET ORAL at 21:20

## 2025-08-06 RX ADMIN — IPRATROPIUM BROMIDE AND ALBUTEROL SULFATE 1 DOSE: .5; 3 SOLUTION RESPIRATORY (INHALATION) at 05:37

## 2025-08-06 RX ADMIN — HYDROMORPHONE HYDROCHLORIDE 0.5 MG: 1 INJECTION, SOLUTION INTRAMUSCULAR; INTRAVENOUS; SUBCUTANEOUS at 16:31

## 2025-08-06 RX ADMIN — OXYCODONE 10 MG: 5 TABLET ORAL at 23:31

## 2025-08-06 RX ADMIN — SODIUM BICARBONATE 650 MG: 650 TABLET ORAL at 09:26

## 2025-08-06 RX ADMIN — LATANOPROST 1 DROP: 50 SOLUTION OPHTHALMIC at 21:20

## 2025-08-06 RX ADMIN — DORZOLAMIDE HYDROCHLORIDE 1 DROP: 20 SOLUTION/ DROPS OPHTHALMIC at 21:20

## 2025-08-06 RX ADMIN — OXYCODONE 10 MG: 5 TABLET ORAL at 15:48

## 2025-08-06 RX ADMIN — DORZOLAMIDE HYDROCHLORIDE 1 DROP: 20 SOLUTION/ DROPS OPHTHALMIC at 09:27

## 2025-08-06 RX ADMIN — HYDROMORPHONE HYDROCHLORIDE 0.25 MG: 1 INJECTION, SOLUTION INTRAMUSCULAR; INTRAVENOUS; SUBCUTANEOUS at 21:19

## 2025-08-06 ASSESSMENT — PAIN DESCRIPTION - LOCATION
LOCATION: ABDOMEN

## 2025-08-06 ASSESSMENT — PAIN - FUNCTIONAL ASSESSMENT
PAIN_FUNCTIONAL_ASSESSMENT: PREVENTS OR INTERFERES SOME ACTIVE ACTIVITIES AND ADLS
PAIN_FUNCTIONAL_ASSESSMENT: PREVENTS OR INTERFERES SOME ACTIVE ACTIVITIES AND ADLS

## 2025-08-06 ASSESSMENT — PAIN DESCRIPTION - DESCRIPTORS
DESCRIPTORS: DISCOMFORT;SHARP
DESCRIPTORS: ACHING;DISCOMFORT
DESCRIPTORS: ACHING

## 2025-08-06 ASSESSMENT — PAIN SCALES - GENERAL
PAINLEVEL_OUTOF10: 7
PAINLEVEL_OUTOF10: 9
PAINLEVEL_OUTOF10: 9
PAINLEVEL_OUTOF10: 7

## 2025-08-06 ASSESSMENT — PAIN DESCRIPTION - ORIENTATION
ORIENTATION: RIGHT;UPPER
ORIENTATION: RIGHT;UPPER

## 2025-08-07 ENCOUNTER — APPOINTMENT (OUTPATIENT)
Facility: HOSPITAL | Age: 75
DRG: 435 | End: 2025-08-07
Payer: MEDICARE

## 2025-08-07 VITALS
TEMPERATURE: 97.7 F | HEART RATE: 84 BPM | OXYGEN SATURATION: 97 % | BODY MASS INDEX: 24.08 KG/M2 | SYSTOLIC BLOOD PRESSURE: 99 MMHG | HEIGHT: 67 IN | DIASTOLIC BLOOD PRESSURE: 63 MMHG | WEIGHT: 153.44 LBS | RESPIRATION RATE: 18 BRPM

## 2025-08-07 PROBLEM — Z51.5 END OF LIFE CARE: Status: ACTIVE | Noted: 2025-08-07

## 2025-08-07 LAB
BASOPHILS # BLD: 0 K/UL (ref 0–0.1)
BASOPHILS NFR BLD: 0 % (ref 0–1)
DIFFERENTIAL METHOD BLD: ABNORMAL
EOSINOPHIL # BLD: 0 K/UL (ref 0–0.4)
EOSINOPHIL NFR BLD: 0 % (ref 0–7)
ERYTHROCYTE [DISTWIDTH] IN BLOOD BY AUTOMATED COUNT: 22.9 % (ref 11.5–14.5)
GLUCOSE BLD STRIP.AUTO-MCNC: 101 MG/DL (ref 65–117)
GLUCOSE BLD STRIP.AUTO-MCNC: 62 MG/DL (ref 65–117)
HCT VFR BLD AUTO: 22.2 % (ref 35–47)
HGB BLD-MCNC: 7.8 G/DL (ref 11.5–16)
IMM GRANULOCYTES # BLD AUTO: 0 K/UL
IMM GRANULOCYTES NFR BLD AUTO: 0 %
LYMPHOCYTES # BLD: 0.54 K/UL (ref 0.8–3.5)
LYMPHOCYTES NFR BLD: 2 % (ref 12–49)
MCH RBC QN AUTO: 32.4 PG (ref 26–34)
MCHC RBC AUTO-ENTMCNC: 35.1 G/DL (ref 30–36.5)
MCV RBC AUTO: 92.1 FL (ref 80–99)
MONOCYTES # BLD: 1.09 K/UL (ref 0–1)
MONOCYTES NFR BLD: 4 % (ref 5–13)
NEUTS BAND NFR BLD MANUAL: 2 % (ref 0–6)
NEUTS SEG # BLD: 25.57 K/UL (ref 1.8–8)
NEUTS SEG NFR BLD: 92 % (ref 32–75)
NRBC # BLD: 0 K/UL (ref 0–0.01)
NRBC BLD-RTO: 0 PER 100 WBC
OSMOLALITY SERPL: 276 MOSM/KG H2O
PLATELET # BLD AUTO: 214 K/UL (ref 150–400)
PMV BLD AUTO: 10 FL (ref 8.9–12.9)
RBC # BLD AUTO: 2.41 M/UL (ref 3.8–5.2)
RBC MORPH BLD: ABNORMAL
SERVICE CMNT-IMP: ABNORMAL
SERVICE CMNT-IMP: NORMAL
WBC # BLD AUTO: 27.2 K/UL (ref 3.6–11)

## 2025-08-07 PROCEDURE — 6360000002 HC RX W HCPCS: Performed by: INTERNAL MEDICINE

## 2025-08-07 PROCEDURE — G0316 PR PROLONG INPT EVAL ADD15 M: HCPCS | Performed by: INTERNAL MEDICINE

## 2025-08-07 PROCEDURE — 2580000003 HC RX 258: Performed by: NURSE PRACTITIONER

## 2025-08-07 PROCEDURE — 6370000000 HC RX 637 (ALT 250 FOR IP): Performed by: HOSPITALIST

## 2025-08-07 PROCEDURE — 6370000000 HC RX 637 (ALT 250 FOR IP): Performed by: INTERNAL MEDICINE

## 2025-08-07 PROCEDURE — 6360000002 HC RX W HCPCS: Performed by: HOSPITALIST

## 2025-08-07 PROCEDURE — 85025 COMPLETE CBC W/AUTO DIFF WBC: CPT

## 2025-08-07 PROCEDURE — 1100000000 HC RM PRIVATE

## 2025-08-07 PROCEDURE — 2500000003 HC RX 250 WO HCPCS: Performed by: NURSE PRACTITIONER

## 2025-08-07 PROCEDURE — 80053 COMPREHEN METABOLIC PANEL: CPT

## 2025-08-07 PROCEDURE — 97530 THERAPEUTIC ACTIVITIES: CPT

## 2025-08-07 PROCEDURE — 94760 N-INVAS EAR/PLS OXIMETRY 1: CPT

## 2025-08-07 PROCEDURE — 97535 SELF CARE MNGMENT TRAINING: CPT

## 2025-08-07 PROCEDURE — 99233 SBSQ HOSP IP/OBS HIGH 50: CPT | Performed by: INTERNAL MEDICINE

## 2025-08-07 PROCEDURE — 99497 ADVNCD CARE PLAN 30 MIN: CPT | Performed by: INTERNAL MEDICINE

## 2025-08-07 PROCEDURE — 71045 X-RAY EXAM CHEST 1 VIEW: CPT

## 2025-08-07 PROCEDURE — 99232 SBSQ HOSP IP/OBS MODERATE 35: CPT | Performed by: PHYSICIAN ASSISTANT

## 2025-08-07 PROCEDURE — 82962 GLUCOSE BLOOD TEST: CPT

## 2025-08-07 PROCEDURE — 83930 ASSAY OF BLOOD OSMOLALITY: CPT

## 2025-08-07 PROCEDURE — 6370000000 HC RX 637 (ALT 250 FOR IP): Performed by: NURSE PRACTITIONER

## 2025-08-07 PROCEDURE — P9047 ALBUMIN (HUMAN), 25%, 50ML: HCPCS | Performed by: INTERNAL MEDICINE

## 2025-08-07 RX ORDER — SCOPOLAMINE 1 MG/3D
1 PATCH, EXTENDED RELEASE TRANSDERMAL
Status: DISCONTINUED | OUTPATIENT
Start: 2025-08-07 | End: 2025-08-08 | Stop reason: HOSPADM

## 2025-08-07 RX ORDER — DIAZEPAM 10 MG/2ML
2.5 INJECTION, SOLUTION INTRAMUSCULAR; INTRAVENOUS EVERY 4 HOURS PRN
Status: DISCONTINUED | OUTPATIENT
Start: 2025-08-07 | End: 2025-08-08 | Stop reason: HOSPADM

## 2025-08-07 RX ORDER — GLYCOPYRROLATE 0.2 MG/ML
0.2 INJECTION INTRAMUSCULAR; INTRAVENOUS 3 TIMES DAILY
Status: DISCONTINUED | OUTPATIENT
Start: 2025-08-07 | End: 2025-08-08 | Stop reason: HOSPADM

## 2025-08-07 RX ORDER — BISACODYL 5 MG/1
5 TABLET, DELAYED RELEASE ORAL DAILY PRN
Status: DISCONTINUED | OUTPATIENT
Start: 2025-08-07 | End: 2025-08-08 | Stop reason: HOSPADM

## 2025-08-07 RX ORDER — DIAZEPAM 10 MG/2ML
5 INJECTION, SOLUTION INTRAMUSCULAR; INTRAVENOUS EVERY EVENING
Status: DISCONTINUED | OUTPATIENT
Start: 2025-08-07 | End: 2025-08-08 | Stop reason: HOSPADM

## 2025-08-07 RX ORDER — DEXTROSE MONOHYDRATE 100 MG/ML
INJECTION, SOLUTION INTRAVENOUS CONTINUOUS PRN
Status: DISCONTINUED | OUTPATIENT
Start: 2025-08-07 | End: 2025-08-07

## 2025-08-07 RX ADMIN — HYDROMORPHONE HYDROCHLORIDE 0.25 MG: 1 INJECTION, SOLUTION INTRAMUSCULAR; INTRAVENOUS; SUBCUTANEOUS at 10:44

## 2025-08-07 RX ADMIN — HYDROMORPHONE HYDROCHLORIDE 0.5 MG: 1 INJECTION, SOLUTION INTRAMUSCULAR; INTRAVENOUS; SUBCUTANEOUS at 05:14

## 2025-08-07 RX ADMIN — SODIUM BICARBONATE 650 MG: 650 TABLET ORAL at 10:46

## 2025-08-07 RX ADMIN — HYDROMORPHONE HYDROCHLORIDE 0.5 MG: 1 INJECTION, SOLUTION INTRAMUSCULAR; INTRAVENOUS; SUBCUTANEOUS at 15:42

## 2025-08-07 RX ADMIN — DOCUSATE SODIUM 50MG AND SENNOSIDES 8.6MG 2 TABLET: 8.6; 5 TABLET, FILM COATED ORAL at 10:47

## 2025-08-07 RX ADMIN — OXYCODONE 10 MG: 5 TABLET ORAL at 08:22

## 2025-08-07 RX ADMIN — ALBUMIN (HUMAN) 12.5 G: 0.25 INJECTION, SOLUTION INTRAVENOUS at 10:47

## 2025-08-07 RX ADMIN — HYDROMORPHONE HYDROCHLORIDE 0.5 MG: 1 INJECTION, SOLUTION INTRAMUSCULAR; INTRAVENOUS; SUBCUTANEOUS at 13:41

## 2025-08-07 RX ADMIN — HYDROMORPHONE HYDROCHLORIDE 0.5 MG: 1 INJECTION, SOLUTION INTRAMUSCULAR; INTRAVENOUS; SUBCUTANEOUS at 13:56

## 2025-08-07 RX ADMIN — POLYETHYLENE GLYCOL 3350 17 G: 17 POWDER, FOR SOLUTION ORAL at 10:47

## 2025-08-07 RX ADMIN — DORZOLAMIDE HYDROCHLORIDE 1 DROP: 20 SOLUTION/ DROPS OPHTHALMIC at 10:58

## 2025-08-07 RX ADMIN — DIAZEPAM 2.5 MG: 5 INJECTION, SOLUTION INTRAMUSCULAR; INTRAVENOUS at 15:41

## 2025-08-07 RX ADMIN — DEXTROSE 125 ML: 10 SOLUTION INTRAVENOUS at 05:21

## 2025-08-07 RX ADMIN — IPRATROPIUM BROMIDE AND ALBUTEROL SULFATE 1 DOSE: .5; 3 SOLUTION RESPIRATORY (INHALATION) at 01:19

## 2025-08-07 RX ADMIN — TIMOLOL MALEATE 1 DROP: 5 SOLUTION/ DROPS OPHTHALMIC at 10:59

## 2025-08-07 RX ADMIN — ALBUMIN (HUMAN) 12.5 G: 0.25 INJECTION, SOLUTION INTRAVENOUS at 01:25

## 2025-08-07 RX ADMIN — SODIUM CHLORIDE, PRESERVATIVE FREE 10 ML: 5 INJECTION INTRAVENOUS at 10:44

## 2025-08-07 RX ADMIN — GLYCOPYRROLATE 0.2 MG: 0.2 INJECTION INTRAMUSCULAR; INTRAVENOUS at 15:41

## 2025-08-07 RX ADMIN — SODIUM CHLORIDE, PRESERVATIVE FREE 10 ML: 5 INJECTION INTRAVENOUS at 13:55

## 2025-08-07 ASSESSMENT — PAIN DESCRIPTION - ORIENTATION
ORIENTATION: RIGHT;LEFT;MID;UPPER
ORIENTATION: RIGHT;UPPER
ORIENTATION: RIGHT;LEFT;MID;UPPER
ORIENTATION: RIGHT;UPPER
ORIENTATION: RIGHT;LEFT;MID;UPPER;LOWER

## 2025-08-07 ASSESSMENT — PAIN SCALES - GENERAL
PAINLEVEL_OUTOF10: 9
PAINLEVEL_OUTOF10: 9
PAINLEVEL_OUTOF10: 10
PAINLEVEL_OUTOF10: 9
PAINLEVEL_OUTOF10: 8

## 2025-08-07 ASSESSMENT — PAIN DESCRIPTION - LOCATION
LOCATION: ABDOMEN

## 2025-08-07 ASSESSMENT — PAIN DESCRIPTION - DESCRIPTORS
DESCRIPTORS: ACHING
DESCRIPTORS: DISCOMFORT
DESCRIPTORS: ACHING;DISCOMFORT

## 2025-08-08 LAB
ALBUMIN SERPL-MCNC: 3.2 G/DL (ref 3.5–5.2)
ALBUMIN/GLOB SERPL: 2 (ref 1.1–2.2)
ALP SERPL-CCNC: 623 U/L (ref 35–104)
ALT SERPL-CCNC: 52 U/L (ref 10–35)
ANION GAP SERPL CALC-SCNC: 18 MMOL/L (ref 2–14)
AST SERPL-CCNC: 183 U/L (ref 10–35)
BILIRUB SERPL-MCNC: 15.7 MG/DL (ref 0–1.2)
BUN SERPL-MCNC: 74 MG/DL (ref 8–23)
BUN/CREAT SERPL: 26 (ref 12–20)
CALCIUM SERPL-MCNC: 8.5 MG/DL (ref 8.8–10.2)
CHLORIDE SERPL-SCNC: 83 MMOL/L (ref 98–107)
CO2 SERPL-SCNC: 20 MMOL/L (ref 20–29)
CREAT SERPL-MCNC: 2.88 MG/DL (ref 0.6–1)
GLOBULIN SER CALC-MCNC: 1.6 G/DL (ref 2–4)
GLUCOSE SERPL-MCNC: 40 MG/DL (ref 65–100)
POTASSIUM SERPL-SCNC: 4.2 MMOL/L (ref 3.5–5.1)
PROT SERPL-MCNC: 4.9 G/DL (ref 6.4–8.3)
SODIUM SERPL-SCNC: 120 MMOL/L (ref 136–145)

## (undated) DEVICE — AIRSEAL 8 MM ACCESS PORT AND LOW PROFILE OBTURATOR WITH BLADELESS OPTICAL TIP, 120 MM LENGTH: Brand: AIRSEAL

## (undated) DEVICE — SUTURE ABSORBABLE MONOFILAMENT 2-0 8 IN ANTIBACT STRATAFIX SXMP1B409

## (undated) DEVICE — SEAL UNIV 5-8MM DISP BX/10 -- DA VINCI XI - SNGL USE

## (undated) DEVICE — INSUFFLATION NEEDLE: Brand: SURGINEEDLE

## (undated) DEVICE — STRAP,POSITIONING,KNEE/BODY,FOAM,4X60": Brand: MEDLINE

## (undated) DEVICE — TIP COVER ACCESSORY

## (undated) DEVICE — ELECTRO LUBE IS A SINGLE PATIENT USE DEVICE THAT IS INTENDED TO BE USED ON ELECTROSURGICAL ELECTRODES TO REDUCE STICKING.: Brand: KEY SURGICAL ELECTRO LUBE

## (undated) DEVICE — VCARE MEDIUM, UTERINE MANIPULATOR, VAGINAL-CERVICAL-AHLUWALIA'S-RETRACTOR-ELEVATOR: Brand: VCARE

## (undated) DEVICE — TRAY PREP DRY W/ PREM GLV 2 APPL 6 SPNG 2 UNDPD 1 OVERWRAP

## (undated) DEVICE — DRAPE,REIN 53X77,STERILE: Brand: MEDLINE

## (undated) DEVICE — GARMENT,MEDLINE,DVT,INT,CALF,MED, GEN2: Brand: MEDLINE

## (undated) DEVICE — (D)PREP SKN CHLRAPRP APPL 26ML -- CONVERT TO ITEM 371833

## (undated) DEVICE — SOLUTION IV 1000ML 0.9% SOD CHL

## (undated) DEVICE — SURGICAL PROCEDURE PACK GYN LAPAROSCOPY CUST SMH LF

## (undated) DEVICE — VISUALIZATION SYSTEM: Brand: CLEARIFY

## (undated) DEVICE — BLADELESS OBTURATOR: Brand: WECK VISTA

## (undated) DEVICE — Device

## (undated) DEVICE — INFECTION CONTROL KIT SYS

## (undated) DEVICE — TAPE,CLOTH/SILK,CURAD,3"X10YD,LF,40/CS: Brand: CURAD

## (undated) DEVICE — PAD,SANITARY,11 IN,MAXI,N-STRL,IND WRAP: Brand: MEDLINE

## (undated) DEVICE — NEEDLE SPNL 22GA L3.5IN BLK HUB S STL REG WALL FIT STYL W/

## (undated) DEVICE — SYR 50ML LR LCK 1ML GRAD NSAF --

## (undated) DEVICE — REM POLYHESIVE ADULT PATIENT RETURN ELECTRODE: Brand: VALLEYLAB

## (undated) DEVICE — TRI-LUMEN FILTERED TUBE SET WITH ACTIVATED CHARCOAL FILTER: Brand: AIRSEAL

## (undated) DEVICE — ARM DRAPE

## (undated) DEVICE — STERILE NEOPRENE POWDER-FREE SURGICAL GLOVES WITH NITRILE COATING: Brand: PROTEXIS

## (undated) DEVICE — SUTURE MCRYL SZ 4-0 L27IN ABSRB UD L19MM PS-2 1/2 CIR PRIM Y426H

## (undated) DEVICE — GLOVE SURG SZ 75 L1212IN FNGR THK138MIL BRN LTX FREE

## (undated) DEVICE — PACK,BASIC,SIRUS,V: Brand: MEDLINE

## (undated) DEVICE — TOTAL TRAY, DB, 100% SILI FOLEY, 16FR 10: Brand: MEDLINE